# Patient Record
Sex: MALE | Race: BLACK OR AFRICAN AMERICAN | NOT HISPANIC OR LATINO | Employment: OTHER | URBAN - METROPOLITAN AREA
[De-identification: names, ages, dates, MRNs, and addresses within clinical notes are randomized per-mention and may not be internally consistent; named-entity substitution may affect disease eponyms.]

---

## 2024-07-19 DIAGNOSIS — I67.89 OTHER CEREBROVASCULAR DISEASE: Primary | ICD-10-CM

## 2024-07-22 ENCOUNTER — CLINICAL SUPPORT (OUTPATIENT)
Dept: REHABILITATION | Facility: HOSPITAL | Age: 44
End: 2024-07-22
Payer: OTHER GOVERNMENT

## 2024-07-22 DIAGNOSIS — I67.89 OTHER CEREBROVASCULAR DISEASE: Primary | ICD-10-CM

## 2024-07-22 DIAGNOSIS — I69.322 DYSARTHRIA AS LATE EFFECT OF CEREBELLAR CEREBROVASCULAR ACCIDENT (CVA): ICD-10-CM

## 2024-07-22 DIAGNOSIS — I69.320 APHASIA AS LATE EFFECT OF CEREBROVASCULAR ACCIDENT (CVA): ICD-10-CM

## 2024-07-22 PROCEDURE — 92523 SPEECH SOUND LANG COMPREHEN: CPT

## 2024-07-22 NOTE — PLAN OF CARE
"[facility]  Speech Therapy Outpatient Evaluation -Neurological Rehabilitation    Date: 7/22/2024     Name: Yong Stallings   MRN: [id number]    Therapy Diagnosis:   Encounter Diagnoses   Name Primary?    Other cerebrovascular disease Yes    Aphasia as late effect of cerebrovascular accident (CVA)     Dysarthria as late effect of cerebellar cerebrovascular accident (CVA)    Physician: Mayito Langston MD  Physician Orders: ST eval and treat  Medical Diagnosis: CVA    Visit # / Visits Authorized:  1 / 15   Date of Evaluation:  7/22/2024   Insurance Authorization Period: 180 days  Plan of Care Certification:    7/22/2024 to 10/22/2024      Time In: 1300   Time Out: 1405    Procedure Min.   Speech Language Evaluation   65          Precautions: Standard and Fall    Subjective     Date of Onset: CVA Aug 2, 2021    History of Current Condition:   Pt communicative at word level at times; majority of history from Pt's father (Joe) and caregiver (Sherri).   Past Medical History: Yong Stallings  has no past medical history on file.  Pt and sitter reported CVA in [facility] 09/2/24 and Pt remained in [facility] due to no physician clearance for Pt to fly until Feb 2024. Pt may have received therapy in the [facility] but this was unclear. Pt has received home health since February but reportedly "he has made all the progress he can make with home health." Family reports he was "terrible" and "like a homeless person" when he arrived from the [facility]. Family reports "so much" progress in physical ability since February but reports speech/language improvement as "I guess a little". Family reports Pt is able to repeat long sentences at home.   Medical Hx and Allergies: Family reports he is on seizure medication with most recent seizure 4 months ago. Sleep and anxiety medications are also reported.   Prior Therapy:  home health 1x/week  Social History:  Pt lives with his father. Sitter present M-F.   Prior Level of Function: " independent prior to CVA   Nutrition:  PO intake - regular with thin   Patient's Therapy Goals: Pt gestured desire to verbally communicate.  Objective   MOTOR SPEECH:  PHYSIOLOGY OF GENERAL MOVEMENT:  Global Posture: right lean 2/2 hemiparesis    MOTOR CONTROL:  Torso: weak  Neck: WFL  Arms/Hands: right michi  Legs/Feet: right michi; weakness in left suspected      PHYSIOLOGY OF SPEECH MECHANISM:  MOTOR CONTROL:  Respiration: WFL  Phonation: reduced breath support   Lips: right flaccidity and reduced ROM  Tongue: deviation to right  Jaw: WFL  Soft Palate: WFL    SENSATION:  Reduced sensation on all aspects of right face    GENERAL ATTENTION:  Orientation: unable to verbalize response to orientation questions; able to verbalize with phonemic cues  Attention: reduced sustained and selective attention noted in structured tasks  Visual Attention: right neglect noted; visuospatial deficits warrants further evaluation    LANGUAGE:   Receptive Language:  1 Step Directions: 100%   2 Step Directions: 25%  Complex Directions: 0%  Simple y/n Questions: 100%  Complex y/n Questions: 75% increased response time and repetition required at times  Paragraph Comprehension: 83%    Expressive Language:   Automatic Speech: -10 100%, days of week 0% independently, 100% with phonemic cues/models  Repetition: phoneme and word with cues only  Phrase completion: closed ended 100%, open ended 0%  Naming items: 100%  Item Function: 0%  WH questions: 0%  Divergent Namin/10 concrete      COGNITIVE STATUS:  Behavioral Observations: emotional lability with laughing noted; perseverative gestures appearing to ask 'why he had a stroke' or 'why he can;t move his right side'   Insight and Awareness: Pt aware of expressive and receptive language errors/difficulty  Pragmatics: reduced understanding of nonverbal cues; inappropriate communication to father re: facial expressions not consistent with conversation  Unable to further assess cognition due to  limited volitional verbal output      Education     Education: Plan of Care, role of SLP in care, and aphasia  were discussed with pt. Patient and family members expressed understanding.     Home Program: to be established; communication strategies via verbal phonemic cues and repetition of self with success reviewed     Assessment     Yong presents to [facility] s/p medical diagnosis of CVA. He presents with likely transcortical motor aphasia (further assessment with BDAE warranted) characterized by impairments including mod level comprehension, basic expression with grimacing at times, imapired motor speech at phoneme level, intact repetition at times, reduced cognition with perseverations.  Positive prognostic factors include motivation. Negative prognostic factors include 3 years post CVA. No barriers to therapy identified. Patient will benefit from skilled, outpatient neurological rehabilitation speech therapy.    Rehab Potential: fair  Pt's spiritual, cultural, and educational needs considered and patient agreeable to plan of care and goals.    Short term goals:    Pt will participate in standardized assessment with BDAE.     Pt will verbalize name for basic introductions with min phonemic cues.     Pt will label personally relevant items with 75% acc min cues.     Pt will comprehend personally relevant information at short story length with 75% acc no cues.         *Short term goals to be adjusted as needed pending BDAE results.     Long Term Goal: Pt will communicate basic wants and needs via any modality with trained caregivers.       Plan     Plan of Care Certification Period: 7/22/2024 to 10/22/2024     Recommended Treatment Plan:  Patient will participate in the [facility] rehabilitation program for speech therapy 3 times per week to address his Communication and Motor Speech deficits, to educate patient and their family, and to participate in a home exercise program.    Other  Recommendations: OT and PT    Therapist's Name:   Kate Olvera CCC-SLP   7/22/2024     I CERTIFY THE NEED FOR THESE SERVICES FURNISHED UNDER THIS PLAN OF TREATMENT AND WHILE UNDER MY CARE    Physician Name: _______________________________    Physician Signature: ____________________________

## 2024-07-23 DIAGNOSIS — I67.89 OTHER CEREBROVASCULAR DISEASE: Primary | ICD-10-CM

## 2024-07-24 ENCOUNTER — CLINICAL SUPPORT (OUTPATIENT)
Dept: REHABILITATION | Facility: HOSPITAL | Age: 44
End: 2024-07-24
Payer: OTHER GOVERNMENT

## 2024-07-24 DIAGNOSIS — I69.320 APHASIA AS LATE EFFECT OF CEREBROVASCULAR ACCIDENT (CVA): ICD-10-CM

## 2024-07-24 DIAGNOSIS — I69.322 DYSARTHRIA AS LATE EFFECT OF CEREBELLAR CEREBROVASCULAR ACCIDENT (CVA): ICD-10-CM

## 2024-07-24 DIAGNOSIS — I67.89 OTHER CEREBROVASCULAR DISEASE: Primary | ICD-10-CM

## 2024-07-24 PROCEDURE — 97162 PT EVAL MOD COMPLEX 30 MIN: CPT

## 2024-07-24 PROCEDURE — 92507 TX SP LANG VOICE COMM INDIV: CPT

## 2024-07-25 NOTE — PROGRESS NOTES
"  Speech and Language Therapy Outpatient Progress Note  Date:  7/24/2024     Name: Yong Stallings   MRN: [id number]   Therapy Diagnosis:   Encounter Diagnoses   Name Primary?    Other cerebrovascular disease Yes    Aphasia as late effect of cerebrovascular accident (CVA)     Dysarthria as late effect of cerebellar cerebrovascular accident (CVA)    Physician: Mayito Langston MD  Physician Orders: speech eval and treat  Medical Diagnosis: CVA    Visit #/Visits authorized: 2/ 15  Date of Evaluation:  07/23/24  Insurance Authorization Period: 180 days  Plan of Care:      7/22/2024 to 10/22/2024   Extended POC:  n/a      UNTIMED  Procedure Min.   {Speech- Language- Voice Therapy  60min / 1 unit           Total Untimed Units: 1  Charges Billed/# of units: 1  Time In:  13:00  Time Out:  14:00   Total Billable Time: 60 min / 1 unit     Precautions: Standard and Fall    Subjective:   Pt reports: Pt reports he is "good" and participated well throughout with good effort and broad affect demonstrated.      Objective:     BDAE short form results:    Rating Scale Profile of Speech Characteristics:  Articulatory Agility   2/7   Phrase Length  3/7   Grammatical Form  3/7    Melodic Line (prosody)   3/7    Paraphasia in running speech   none   Word finding relative to fluency     Sentence repetition     Auditory comprehension       Domain  Subtest Raw Score Percentile    Conversational/Expository Speech  Simple Social Responses  1/7 <10   Auditory Comprehension Basic word discrimination   11/16 15    Commands  6/10 25    Complex Ideational Material   4/6 50   Articulation  Articulatory Agility (rating Scale)  2/7 10   Recitation  Automatized Sequences   2/4  20   Repetition  Words  2/5  20    Sentences  0/2 30   Naming  Responsive Naming   1/10 15    [place] Naming Test   1/15 20    Special Categories   0/12 0   Reading  Matching Cases and Scripts  4/4 100    Number Matching   4/4 100    Picture-Word Matching   3/4 40    Oral Word Reading "   0/15 10    Oral Sent Reading   0/5 30    Oral Sentence Reading  0/3 10    Oral Sentence/Paragraph Comprehension   1/4  10      hort term goals:     Pt will participate in standardized assessment with BDAE.   met 24   Pt will verbalize name and personally relevant information via naming or wh-questions for basic communication with min phonemic cues.      Pt will label personally relevant items with 75% acc min cues.      Pt will comprehend personally relevant information at short story length with 75% acc no cues.       Pt will tolerate NMES to right buccal/face for improved ROM and sensation for control of secretions and increased ROM. mA to be determined.     *Short term goals to be adjusted as needed pending BDAE results.      Long Term Goal: Pt will communicate basic wants and needs via any modality with trained caregivers.           Patient Education/Response:   SLP educated Pt and family on aphasia and likely transcortical motor aphasia.     Written Home Exercises Provided: naming items at mealtime  Exercises were reviewed and Yong was able to demonstrate them prior to the end of the session.  Yong demonstrated good  understanding of the education provided.         Assessment:   Yong is progressing well towards his goals.  Current goals remain appropriate. Goals to be updated as necessary.     Pt prognosis is Fair. Pt will continue to benefit from skilled outpatient speech and language therapy to address the deficits listed in the problem list on initial evaluation, provide pt/family education and to maximize pt's level of independence in the home and community environment.     KT NOMS (Functional Communication Measures):  KT NOMS: EVAL (... ) CURRENT   Attention: 4    Memory: 4    Motor Speech: 3    Readin    Spoken Language Comprehension: 4    Spoken Language Expression: 2    Swallowin           Barriers to Therapy: prolonged period since CVA  Pt's spiritual, cultural and educational needs  considered and pt agreeable to plan of care and goals.    Plan:   Continue POC with focus on functional communication of basic wants/needs.       Kate Olvera MA, CCC-SLP  07/26/2024

## 2024-07-25 NOTE — PROGRESS NOTES
"[facility]   Physical Therapy Initial Evaluation        Name: Yong Stallings      Therapy Diagnosis:   Encounter Diagnosis   Name Primary?    Other cerebrovascular disease Yes     Physician: Mayito Langston MD    Physician Orders: PT Eval and Treat  Medical Diagnosis from Referral: h/o CVA  Evaluation Date: 7/24/2024    Visit # / Visits authorized: 1/15    Time In: 1400  Time Out: 1508  Total Billable Time: 68 minutes    Precautions: Fall        Subjective     Date of onset: CVA occurred 3 years ago    History of current condition - History obtained from pt's father who was present throughout assessment. Pt reportedly suffered a CVA while living in the [facility] 3 years ago.  Pt just recently returned to the [facility] in Feb of 2024.  Pt did receive therapy services in the [facility], but pt's father was unable to provide specifics.  Since his return to the , pt has been residing with his father who serves as his primary caregiver. Pt also has a sitter who is present M-F from 9-5:30.  Pt and father reside in a single story home without steps to enter. Pt has a manual WC in which he presented to therapy today. PT noted that the WC does not have removable armrests or foot rests. Father later reported that the foot rests were in the car.  Pt also has a transport WC which he utilizes in the bathroom due to the narrow doorways. [facility] which pt reportedly "refuses to use".  Pt's father showed PT a picture of the chair.  The power WC has a L joystick and a forearm trough on the R.  Pt also has a "sliding tub bench" and a gait belt at home. Pt is assisted with all mobility.  Sitter and father dependently transfer pt to and from his WC.  Pt sleeps in a bed with an elevating function.  Bed railings are available, but father reports that pt refused to allow them to be attached to the bed.  In terms of ADLs, pt is assisted with bathing, dressing, and toilet transfers. Pt " "does assist with bathing and dressing as able, and performs his own hygiene for toileting.  Prior to his referral to outpatient therapy, pt was receiving HH services.  Father reports that pt was working on standing while holding onto the kitchen countertop.  Pt does present with aphasia and some limited communication.  Pt is limitedly verbal but his very expressive via gestures and facial expressions. Pt did appear to understand more complex conversation, but command following was limited to single step. Pt is currently receiving outpatient speech therapy services and will be evaluated by OT later this week.      Medical History:   No past medical history on file.    Surgical History:   Yong Stallings  has no past surgical history on file.    Medications:   Yong currently has no medications in their medication list.    Allergies:   Review of patient's allergies indicates:  Not on File       Prior Therapy:  services  Social History:  lives with this father  Occupation: previously in the [employer]      Pain:  Current 0/10      Pts goals: "to walk again".          Objective     Posture: fair sitting posture noted in the WC.  Posterior pelvic tilt observed.  Cushion in place.  R arm in a resting hand splint.  Feet on the floor rather than on footrests.      Sensation: Impaired light touch sensation present on the RLE. Pt able to correctly identify approximately 50% of points of light touch.      Range of Motion/Strength: LLE -- hip flexion 2+/5, knee extension 4/5, knee flexion 4/5, DF 3+/5.  No volitional movement noted in the RLE.      Gait Analysis:  Pt is non-ambulatory      Functional Mobility:     Assist Level Assistive Device Comments    Bed Mobility MaxA  Pt's father assisted with sitting to supine and supine to sitting transitions for PT to observe pt's baseline level of functioning.  Pt did utilize his RUE to assist, but father assisted with trunk and BLE.  Father reported that pt refuses to allow railings to be " attached to his bed.  With the use of a railing, PT believes that pt could be much more independent with bed mobility.     Sit to stand/Stand to sit ModA-MaxA  Sit to stand/stand to sit transitions performed from WC level and from the low mat.  From the WC, pt stood x 3 trials utilizing a railing positioned anteriorly. Pt pulled himself up into standing with the LUE.  ModA required to clear buttocks.  Once upright, pt was able to maintain static standing with Lola.  R and L weight shifting was performed x 10 reps with PT providing assistance.  TC provided for posture.  Pt's R foot contacted the floor, but no active weight bearing was observed.  Pt also stood x 3 trials from the low mat in the gym.  Due to the height of this surface, increased assistance was required from PT.  Pt utilized the railing positioned to his L.  PT stood anteriorly and blocked pt's R knee to prevent buckling.  VC provided for increased eccentric control during descent.  This improved with cueing.     Transfers MaxA  Father dependently transferred pt from the WC to the low mat.  Pt's feet were observed losing contact with the floor as father completely supported the pt's body weight.  Sitter transferred pt back to the WC via a stand-pivot.      PT positioned pt at the low mat and instructed pt to utilize the railing for assistance to pivot from the WC to the mat.  Pt expressed hesitation and shook his head, appearing skeptical.  Pt performed the transition with modA provided by PT.  Pt also utilized the railing to perform a stand pivot from the mat to return to the WC.     Gait      Balance Training      Wheelchair Mobility Lola  Pt able to propel and steer his manual WC approximately 15 ft and park it next to the mat to simulate mobility within the home.  Since pt did not have a foot rest, PT held pt's R foot off the floor.  Pt required cueing and instruction for use of the LUE and LLE.  Pt could very likely propel himself within the home  "with modified independence, but pt does not do so currently. Pt allows his father and sitter to propel his WC.     Stair Climbing      Car Transfer      Other:  Hailey  Pt performed 2 sets of 5 modified sit ups at the edge of the mat.  A wedge and pillow were placed behind pt. Pt performed the first 4 reps without PT's assistance, but fatigued quickly.  PT stabilized pt's legs and pt used his LUE for assistance.               Assessment     Yong is a 44 y.o. male referred to outpatient Physical Therapy with a medical diagnosis of a previous CVA. Pt presents to PT with limitations in basic mobility and is very dependent on his father and caregiver for mobility and ADLs.  PT feels that with the appropriate DME and transfer training, pt can become much more independent at a WC level. Pt adamantly expresses that his current goal is "to walk".  PT and SLP spoke with pt about realistic goal setting and needing to establish increased independence with basic transfers.  Pt appears very motivated to participate, but does need continued education about the expectations of therapy.  At this time, PT feels that it would be very reasonable to focus on bed mobility and WC transfers in an effort to reduce caregiver burden and maximize pt's independence at a WC level. Pt does have a custom motorized WC, but refuses to utilize this device.  Pt will benefit from a standard 18 inch WC with removable armrests and footrests.  This will promote increased independence with basic transfers and also improve pt's safety when being assisted by his father/caregiver. As previously mentioned, pt is scheduled to be evaluated by OT later this week. Will tentatively establish pt's POC for 2 times per week for 7 weeks.  Once OT evaluates pt, will coordinate to determine the most appropriate POC moving forward.      Pt prognosis is Fair.   Pt will benefit from skilled outpatient Physical Therapy to address the deficits stated above and in the chart " below, provide pt/family education, and to maximize pt's level of independence.     Plan of care discussed with patient: Yes  Pt's spiritual, cultural and educational needs considered and pt agreeable to plan of care and goals as stated below:     Anticipated Barriers for therapy: duration since CVA, questionable expectations from pt          Goals:    Short Term Goals (4 Weeks):     1)  Pt will perform bed mobility (sitting to supine, supine to sitting) with Lola.  2)  Pt will perform sit to stand/stand to sit transitions with Lola.   3)  Pt will perform stand pivot transfers to and from his WC with modA.  4)  Pt will self-propel his manual  ft with SBA using his LUE and LLE.        Long Term Goals (7 Weeks):     1)  Pt will perform bed mobility (sitting to supine, supine to sitting) with supervision.  2)  Pt will perform sit to stand/stand to sit transitions with CGA.  3)  Pt will perform stand pivot transfers to and from his WC with Lola.   4)  Pt will self-propel his manual  ft with modified independence using his LUE and LLE.            Plan     Plan of care Certification: 7/24/2024 to 10/24/2024.    Outpatient Physical Therapy 2 times weekly for 7 weeks to include the following interventions: Patient Education, Therapeutic Activities, and Therapeutic Exercise.     Cliff Hollis, PT, DPT        I CERTIFY THE NEED FOR THESE SERVICES FURNISHED UNDER THIS PLAN OF TREATMENT AND WHILE UNDER MY CARE    Physician Name: _______________________________    Physician Signature: ____________________________

## 2024-07-26 ENCOUNTER — CLINICAL SUPPORT (OUTPATIENT)
Dept: REHABILITATION | Facility: HOSPITAL | Age: 44
End: 2024-07-26
Payer: OTHER GOVERNMENT

## 2024-07-26 DIAGNOSIS — I67.89 OTHER CEREBROVASCULAR DISEASE: Primary | ICD-10-CM

## 2024-07-26 DIAGNOSIS — I67.89 CEREBROVASCULAR DISEASE, ACUTE: ICD-10-CM

## 2024-07-26 DIAGNOSIS — I69.322 DYSARTHRIA AS LATE EFFECT OF CEREBELLAR CEREBROVASCULAR ACCIDENT (CVA): ICD-10-CM

## 2024-07-26 DIAGNOSIS — I69.320 APHASIA AS LATE EFFECT OF CEREBROVASCULAR ACCIDENT (CVA): ICD-10-CM

## 2024-07-26 PROCEDURE — 92507 TX SP LANG VOICE COMM INDIV: CPT

## 2024-07-26 PROCEDURE — 97166 OT EVAL MOD COMPLEX 45 MIN: CPT

## 2024-07-26 NOTE — PROGRESS NOTES
"[facility]   OUTPATIENT THERAPY     Occupational Therapy Initial Evaluation    Date: 2024  Name: Yong Stallings  Clinic Number: [id number]  : 24      Therapy Diagnosis:   Encounter Diagnoses   Name Primary?    Other cerebrovascular disease Yes    Cerebrovascular disease, acute      Physician: Mayito Langston MD    Physician Orders: OT Evaluate and Treat  Medical Diagnosis: Cerebrovascular Disease  Surgical Procedure and Date: N/A  Evaluation Date: 2024  Insurance Authorization Period Expiration: TBD  Plan of Care Expiration: 10/25/24  Progress Note Due: 24   Date of Return to MD: TBD  Visit # / Visits authorized: 15 / 15    Precautions:  Seizure    Medical History:   No past medical history on file.    Surgical History:    has no past surgical history on file.    Medications:   currently has no medications in their medication list.    Allergies:   Review of patient's allergies indicates:  Not on File                   SUBJECTIVE     Date of Onset: CVA occurred 3 years prior.    History of Current Condition/Mechanism of Injury:   History taken from PT evaluation:  "History obtained from pt's father who was present throughout assessment. Pt reportedly suffered a CVA while living in the [facility] 3 years ago. Pt just recently returned to the [facility] in . Pt did receive therapy services in the [facility], but pt's father was unable to provide specifics. Since his return to the , pt has been residing with his father who serves as his primary caregiver. Pt also has a sitter who is present M-F from 9-5:30. Pt and father reside in a single-story home without steps to enter. Pt has a manual WC in which he presented to therapy today. PT noted that the WC does not have removable armrests or footrests. Father later reported that the footrests were in the car. Pt also has a transport WC which he utilizes in the bathroom due to the narrow doorways. [facility] " "custom power chair which pt reportedly "refuses to use". Pt's father showed PT a picture of the chair. The power WC has a L joystick and a forearm trough on the R. Pt also has a "sliding tub bench" and a gait belt at home. Pt is assisted with all mobility. Sitter and father dependently transfer pt to and from his WC. Pt sleeps in a bed with an elevating function. Bed railings are available, but father reports that pt refused to allow them to be attached to the bed. In terms of ADLs, pt is assisted with bathing, dressing, and toilet transfers. Pt does assist with bathing and dressing as able and performs his own hygiene for toileting. Prior to his referral to outpatient therapy, pt was receiving HH services. Father reports that pt was working on standing while holding onto the kitchen countertop. Pt does present with aphasia and some limited communication. Pt is limitedly verbal but his very expressive via gestures and facial expressions. Pt did appear to understand more complex conversation, but command following was limited to single step. Pt is currently receiving outpatient speech therapy services and will be evaluated by OT later this week."    Falls:  Pt sustained a seizure on 06/26/24, fell out of wheelchair and hit his head. See imaging report below.    Involved Side: Right  Dominant Side: Right  Imaging: CT studies, 06/26/24   "CT Head without Contrast  Result Date: 6/26/2024  History: Seizure, fall, head injury Comparison studies: None Technique: Axial scans were obtained from skull base to the vertex. Coronal and sagittal reconstructions obtained from the axial data. Automatic exposure control was utilized to limit radiation dose. Contrast: None Radiation Dose: Total DLP: 1233 mGy*cm DISCUSSION: Scalp/Skull: No acute abnormalities. Previous left frontal craniectomy. Brain sulci: Appropriate for patient's age. Ventricles: Ex vacuo dilatation throughout the left lateral and 3rd ventricles. Otherwise normal " "in size and configuration. No hydrocephalus. Extra-axial spaces: No masses or fluid collections. Parenchyma: Scattered encephalomalacic changes throughout the left cerebral hemisphere compatible with remote insults. No mass, hemorrhage or CT evidence of an acute vascular insult. Dural sinuses: No abnormal densities. Sellar/Suprasellar region: No abnormalities. Skull base and Craniocervical junction: No abnormalities. Incidental findings: Diffusely hyperdense intracranial vasculature in keeping with hemoconcentration. "  Prior Therapy: Pt received inpatient therapy in the [facility] and Home Health services once transferred to fathers' home in .  Occupation:  [employer]  Working presently: disability  Duties: NA    Functional Limitations/Social History:    Previous functional status includes: Independent with all ADLs.     Current Functional Status   Home/Living environment: Lives with father in a [facility].      Limitation of Functional Status as follows:   ADLs/IADLs:     - Feeding: Setup using left hand.    - Bathing: Moderate assist seated on TTB.    - Dressing/Grooming: Dressing-Max assist LE, Mod assist UE.  Grooming-SBA after setup                         - Toileting: Max assist transfer on<>off.  Pt requires max assist with clothing management but can perform hygiene with setup.    - Driving: NA    Pain:  Functional Pain Scale Rating 0-10: Current 0/10, worst 0/10, best 0/10     Patient's Goals for Therapy: "To walk again."      OBJECTIVE     Upon initial contact, noted well-nourished well-groomed male sitting in a wheelchair with right resting hand splint donned.  Noted RUE held in flexed position against body.  Transferred pt wc to mat max assist stand pivot.  Noted pt unable to pivot BLEs.  Noted pt able to sit unsupported but noted LOB when challenged to right side, pt able to recover when challenged to left, forward, and back.  Assessed oculomotor scanning and noted deficits all planes.  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  Transferred sit to supine max assist to right.  Assessed right scapular mobility and noted stiffness all planes.  Pt unable to elicit AROM all shoulder planes but did note trace activation in abduction.  Removed right resting hand splint. Pt demonstrates severe hypertonicity through RUE and holds UE in a flexor synergy pattern.  After slow progressive passive stretching was able to achieve full extension at the elbow, wrist, and all digits.  PROM of the shoulder was able to achieve 110 degrees flexion, 80 degrees abduction, and 30 degrees ER.   Assessed bed mobility and pt able to roll to left/right with min assist grabbing onto edge of mat with LUE.  Did note moderate trunk rigidity.  Transferred supine to sit mod assist with BLE then mod assist with upper trunk. Reapplied right resting hand splint and assess fit.         Patient Education and Home Exercises      Education provided:   - Educated pt/family on homonomous hemiopsia and strategies when addressing pt and where to place items in his visual field.  Discussed with pt/family realistic goals based on pt's functional deficits.    Patient/Family Education: role of OT, goals for OT, scheduling/cancellations - pt verbalized understanding. Discussed insurance limitations with patient.      ASSESSMENT     Yong Stallings is a 44 y.o. male referred to outpatient occupational therapy and presents with a medical diagnosis of CVA with right hemiplegia.  Patient presents with the following therapy deficits: Decreased ROM, Decreased  strength, Decreased pinch strength, Decreased functional hand use, Joint Stiffness, Diminished/Impaired Sensation, Diminished/Impaired Coordination, Decreased visual perception, Decreased functional transfers, and impaired ADL ability.  Following medical record review, it is determined that pt will benefit from occupational therapy services in order to improve  functional transfers and ADLs to decrease burden of care and maximize pt's functional abilities to improve quality of life. The following goals were discussed with the patient and patient agrees with them as to be addressed in the treatment plan. The patient's rehab potential is Good.     Anticipated barriers to occupational therapy: Severity and length of time since onset.    Pt has no cultural, educational or language barriers to learning provided.      The following goals were discussed with the patient and patient agrees with them as to be addressed in the treatment plan.     Goals:     LTG: (8 Weeks)   Pt will increase toilet transfers to min assist.  Pt with be able to don UE garments with min assist and LE garments with mod assist.  Pt will increase RUE function to be able to transfer supine to sit CGA from right side.    STG: (4 Weeks)   Pt will increase toilet transfers to mod assist stand pivot.  Pt will increase UE dressing to CGA and LE dressing to mod assist.  Pt will increase RUE function to be able to transfer supine to sit with min assist from right side.          PLAN   Plan of Care Certification: 7/26/2024 to 10/25/24.     Outpatient Occupational Therapy 2 times weekly for 7 weeks to include the following interventions: Manual therapy/joint mobilizations, Modalities for pain management, Therapeutic exercises/activities., Strengthening, Transfer training, ADL Training, Neuro reeducation and education.    Time In: 1500  Time Out: 1550  Total Appointment Time (timed & untimed codes): 50 minutes      BEN James        I CERTIFY THE NEED FOR THESE SERVICES FURNISHED UNDER THIS PLAN OF TREATMENT AND WHILE UNDER MY CARE  Physician's comments:      Physician's Signature: ___________________________________________________

## 2024-07-26 NOTE — PROGRESS NOTES
Speech and Language Therapy Outpatient Progress Note  Date:  7/26/2024     Name: Yong Stallings   MRN: [id number]   Therapy Diagnosis:   Encounter Diagnoses   Name Primary?    Other cerebrovascular disease Yes    Aphasia as late effect of cerebrovascular accident (CVA)     Dysarthria as late effect of cerebellar cerebrovascular accident (CVA)    Physician: Mayito Langston MD  Physician Orders: speech eval and treat  Medical Diagnosis: CVA    Visit #/Visits authorized: 2/ 15  Date of Evaluation:  07/23/24  Insurance Authorization Period: 180 days  Plan of Care:      7/22/2024 to 10/22/2024   Extended POC:  n/a      UNTIMED  Procedure Min.   {Speech- Language- Voice Therapy  60min / 1 unit           Total Untimed Units: 1  Charges Billed/# of units: 1  Time In:  13:00  Time Out:  14:00   Total Billable Time: 60 min / 1 unit     Precautions: Standard and Fall    Subjective:   Pt reports: Pt reports feeling well and appeared excited about the session.      Objective:     SLP placed Vital Stim electrodes on Pt bilateral buccal space targeting improved ROM of articulators. Left set to 3.0mA and right set to 8.0mA. Pt reported no sensation until 7.5mA on right. Stim provided throughout speech drills and mirror used at times.     Pt repeated 5 units of CV and CVC with auditory and/or visual articulatory cues required >75% of the time.    Short term goals:     Pt will participate in standardized assessment with BDAE.   met 07/24/24   Pt will complete automatic language tasks at mod level with >90% intelligibility and min cues.    Pt will verbalize name and personally relevant information via naming or wh-questions for basic communication with min phonemic cues.      Pt will label personally relevant items with 75% acc min cues.   Pt labeled common household items with 75% acc with phonemic cues required for all correct output. Pt able to repeat with 100% acc min cues.    Pt will comprehend personally relevant information at  short story length with 75% acc no cues.       Pt will tolerate NMES to right buccal/face for improved ROM and sensation for control of secretions and increased ROM.  Pt tolerated at the following levels with speech ROM drilled using personally relevant information throughout.  Left: 3.0mA  Right: 8.0mA        Long Term Goal: Pt will communicate basic wants and needs via any modality with trained caregivers.           Patient Education/Response:   SLP educated Pt and family on automatic language tasks, goals of NMES, and homework.    Written Home Exercises Provided: singing for automatic language, 5 unit CV and CVC repeats.  Exercises were reviewed and Yong was able to demonstrate them prior to the end of the session.  Yong demonstrated good  understanding of the education provided.         Assessment:   Yong is progressing well towards his goals.  Current goals remain appropriate. Goals to be updated as necessary.     Pt prognosis is Fair. Pt will continue to benefit from skilled outpatient speech and language therapy to address the deficits listed in the problem list on initial evaluation, provide pt/family education and to maximize pt's level of independence in the home and community environment.     KT NOMS (Functional Communication Measures):  KT NOMS: EVAL (... ) CURRENT   Attention: 4 4   Memory: 4 4   Motor Speech: 3 3   Readin 4   Spoken Language Comprehension: 4 4   Spoken Language Expression: 2 2   Swallowin 7          Barriers to Therapy: prolonged period since CVA  Pt's spiritual, cultural and educational needs considered and pt agreeable to plan of care and goals.    Plan:   Continue POC with focus on functional communication of basic wants/needs.       Kate Olvera MA, CCC-SLP  2024           Speech and Language Therapy Outpatient Progress Note  Date:  2024     Name: Yong Stallings   MRN: [id number]   Therapy Diagnosis:   Encounter Diagnoses   Name Primary?    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   {Speech- Language- Voice Therapy  60min / 1 unit           Total Untimed Units: 1  Charges Billed/# of units: 1  Time In:  13:00  Time Out:  14:00   Total Billable Time: 60 min / 1 unit     Precautions: Standard and Fall    Subjective:   Pt reports: Pt reports he is "good" and participated well throughout with good effort and broad affect demonstrated.      Objective:     BDAE short form results:    Rating Scale Profile of Speech Characteristics:  Articulatory Agility   2/7   Phrase Length  3/7   Grammatical Form  3/7    Melodic Line (prosody)   3/7    Paraphasia in running speech   none   Word finding relative to fluency     Sentence repetition     Auditory comprehension       Domain  Subtest Raw Score Percentile    Conversational/Expository Speech  Simple Social Responses  1/7 <10   Auditory Comprehension Basic word discrimination   11/16 15    Commands  6/10 25    Complex Ideational Material   4/6 50   Articulation  Articulatory Agility (rating Scale)  2/7 10   Recitation  Automatized Sequences   2/4  20   Repetition  Words  2/5  20    Sentences  0/2 30   Naming  Responsive Naming   1/10 15    [place] Naming Test   1/15 20    Special Categories   0/12 0   Reading  Matching Cases and Scripts  4/4 100    Number Matching   4/4 100    Picture-Word Matching   3/4 40    Oral Word Reading   0/15 10    Oral Sent Reading   0/5 30    Oral Sentence Reading  0/3 10    Oral Sentence/Paragraph Comprehension   1/4  10      hort term goals:     Pt will participate in standardized " assessment with BDAE.   met 24   Pt will verbalize personally relevant information via naming or wh-questions for basic communication with min phonemic cues.      Pt will label personally relevant items with 75% acc min cues.      Pt will comprehend personally relevant information at short story length with 75% acc no cues.       Pt will tolerate NMES to right buccal space/face for improved ROM and sensation for control of secretions and increased ROM. mA to be determined.     *Short term goals to be adjusted as needed pending BDAE results.      Long Term Goal: Pt will communicate basic wants and needs via any modality with trained caregivers.           Patient Education/Response:   SLP educated Pt and family on aphasia and likely transcortical motor aphasia.     Written Home Exercises Provided: naming items at mealtime  Exercises were reviewed and Yong was able to demonstrate them prior to the end of the session.  Yong demonstrated good  understanding of the education provided.         Assessment:   Yong is progressing well towards his goals.  Current goals remain appropriate. Goals to be updated as necessary.     Pt prognosis is Fair. Pt will continue to benefit from skilled outpatient speech and language therapy to address the deficits listed in the problem list on initial evaluation, provide pt/family education and to maximize pt's level of independence in the home and community environment.     KT NOMS (Functional Communication Measures):  KT NOMS: EVAL (... ) CURRENT   Attention: 4    Memory: 4    Motor Speech: 3    Readin    Spoken Language Comprehension: 4    Spoken Language Expression: 2    Swallowin           Barriers to Therapy: prolonged period since CVA  Pt's spiritual, cultural and educational needs considered and pt agreeable to plan of care and goals.    Plan:   Continue POC with focus on functional communication of basic wants/needs.       Kate Olvera MA,  CCC-SLP  07/30/2024

## 2024-07-29 ENCOUNTER — CLINICAL SUPPORT (OUTPATIENT)
Dept: REHABILITATION | Facility: HOSPITAL | Age: 44
End: 2024-07-29
Payer: OTHER GOVERNMENT

## 2024-07-29 DIAGNOSIS — I67.89 OTHER CEREBROVASCULAR DISEASE: Primary | ICD-10-CM

## 2024-07-29 DIAGNOSIS — I67.89 CEREBROVASCULAR DISEASE, ACUTE: ICD-10-CM

## 2024-07-29 DIAGNOSIS — I69.322 DYSARTHRIA AS LATE EFFECT OF CEREBELLAR CEREBROVASCULAR ACCIDENT (CVA): ICD-10-CM

## 2024-07-29 DIAGNOSIS — I69.320 APHASIA AS LATE EFFECT OF CEREBROVASCULAR ACCIDENT (CVA): ICD-10-CM

## 2024-07-29 PROCEDURE — 92507 TX SP LANG VOICE COMM INDIV: CPT

## 2024-07-30 NOTE — PROGRESS NOTES
"  Speech and Language Therapy   Outpatient Progress Note  Date:  7/29/2024     Name: Yong Stallings   MRN: [id number]   Therapy Diagnosis:   Encounter Diagnoses   Name Primary?    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   {Speech- Language- Voice Therapy  60min / 1 unit           Total Untimed Units: 1  Charges Billed/# of units: 1  Time In:  13:00  Time Out:  14:00   Total Billable Time: 60 min / 1 unit     Precautions: Standard and Fall    Subjective:   Pt reports: Pt reports feeling well and reported being "ready".      Objective:       Short term goals:     Pt will participate in standardized assessment with BDAE.   met 07/24/24   Pt will complete automatic language tasks at mod level with >90% intelligibility and min cues. Pt completed the following automatic language tasks:  1-10 initiation cues only  10-1 initiation cues only   Pledge: <25% acc max cues  SSB: <25% acc max cues   Pt will verbalize name and personally relevant information via naming or wh-questions for basic communication with min phonemic cues.   Pt responded to questions with the following targeted information:  Name: phonemic cues (PC) required 100% of time initially, 3x independently by end of session  Bday: PC required 100% of time  Familty names: % of time; Dad independent x2  SLP created video on Dad's phone of Pt at single word level for function words (hmwk to watch and repeat).   Pt will label personally relevant items with 75% acc min cues.      Pt will comprehend personally " relevant information at short story length with 75% acc no cues.       Pt will tolerate NMES to right buccal/face for improved ROM and sensation for control of secretions and increased ROM.         Long Term Goal: Pt will communicate basic wants and needs via any modality with trained caregivers.           Patient Education/Response:   SLP educated Pt and family on automatic language concepts.    Written Home Exercises Provided:video with repeats (see above) and automatic language tasks   Exercises were reviewed and Yong was able to demonstrate them prior to the end of the session.  Yong demonstrated good  understanding of the education provided.         Assessment:   Yong is progressing well towards his goals.  Current goals remain appropriate. Goals to be updated as necessary.     Pt prognosis is Fair. Pt will continue to benefit from skilled outpatient speech and language therapy to address the deficits listed in the problem list on initial evaluation, provide pt/family education and to maximize pt's level of independence in the home and community environment.     KT NOMS (Functional Communication Measures):  KT NOMS: EVAL (... ) CURRENT   Attention: 4 4   Memory: 4 4   Motor Speech: 3 3   Readin 4   Spoken Language Comprehension: 4 4   Spoken Language Expression: 2 2   Swallowin 7          Barriers to Therapy: prolonged period since CVA  Pt's spiritual, cultural and educational needs considered and pt agreeable to plan of care and goals.    Plan:   Continue POC with focus on functional communication of basic wants/needs.       Kate Olvera MA, CCC-SLP  2024

## 2024-08-01 ENCOUNTER — CLINICAL SUPPORT (OUTPATIENT)
Dept: REHABILITATION | Facility: HOSPITAL | Age: 44
End: 2024-08-01
Payer: OTHER GOVERNMENT

## 2024-08-01 DIAGNOSIS — I67.89 OTHER CEREBROVASCULAR DISEASE: Primary | ICD-10-CM

## 2024-08-01 DIAGNOSIS — I69.322 DYSARTHRIA AS LATE EFFECT OF CEREBELLAR CEREBROVASCULAR ACCIDENT (CVA): ICD-10-CM

## 2024-08-01 DIAGNOSIS — I69.320 APHASIA AS LATE EFFECT OF CEREBROVASCULAR ACCIDENT (CVA): ICD-10-CM

## 2024-08-01 DIAGNOSIS — I67.89 CEREBROVASCULAR DISEASE, ACUTE: ICD-10-CM

## 2024-08-01 PROCEDURE — 92507 TX SP LANG VOICE COMM INDIV: CPT

## 2024-08-02 ENCOUNTER — CLINICAL SUPPORT (OUTPATIENT)
Dept: REHABILITATION | Facility: HOSPITAL | Age: 44
End: 2024-08-02
Payer: OTHER GOVERNMENT

## 2024-08-02 DIAGNOSIS — I69.320 APHASIA AS LATE EFFECT OF CEREBROVASCULAR ACCIDENT (CVA): ICD-10-CM

## 2024-08-02 DIAGNOSIS — I67.89 OTHER CEREBROVASCULAR DISEASE: Primary | ICD-10-CM

## 2024-08-02 DIAGNOSIS — I67.89 CEREBROVASCULAR DISEASE, ACUTE: ICD-10-CM

## 2024-08-02 DIAGNOSIS — I69.322 DYSARTHRIA AS LATE EFFECT OF CEREBELLAR CEREBROVASCULAR ACCIDENT (CVA): ICD-10-CM

## 2024-08-02 PROCEDURE — 92507 TX SP LANG VOICE COMM INDIV: CPT

## 2024-08-02 NOTE — PLAN OF CARE
"[facility]   OUTPATIENT THERAPY     Occupational Therapy Initial Evaluation    Date: 2024  Name: Yong Stallings  Clinic Number: [id number]  : 23      Therapy Diagnosis:   Encounter Diagnoses   Name Primary?    Other cerebrovascular disease Yes    Cerebrovascular disease, acute      Physician: Mayito Langston MD    Physician Orders: OT Evaluate and Treat  Medical Diagnosis: Cerebrovascular Disease  Surgical Procedure and Date: N/A  Evaluation Date: 2024  Insurance Authorization Period Expiration: TBD  Plan of Care Expiration: 10/25/24  Progress Note Due: 24   Date of Return to MD: TBD  Visit # / Visits authorized: 15 / 15    Precautions:  Seizure    Medical History:   No past medical history on file.    Surgical History:    has no past surgical history on file.    Medications:   currently has no medications in their medication list.    Allergies:   Review of patient's allergies indicates:  Not on File                   SUBJECTIVE     Date of Onset: CVA occurred 3 years prior.    History of Current Condition/Mechanism of Injury:   History taken from PT evaluation:  "History obtained from pt's father who was present throughout assessment. Pt reportedly suffered a CVA while living in the [facility] 3 years ago. Pt just recently returned to the [facility] [place] in . Pt did receive therapy services in the [facility], but pt's father was unable to provide specifics. Since his return to the , pt has been residing with his father who serves as his primary caregiver. Pt also has a sitter who is present M-F from 9-5:30. Pt and father reside in a single-story home without steps to enter. Pt has a manual WC in which he presented to therapy today. PT noted that the WC does not have removable armrests or footrests. Father later reported that the footrests were in the car. Pt also has a transport WC which he utilizes in the bathroom due to the narrow doorways. [facility] " "custom power chair which pt reportedly "refuses to use". Pt's father showed PT a picture of the chair. The power WC has a L joystick and a forearm trough on the R. Pt also has a "sliding tub bench" and a gait belt at home. Pt is assisted with all mobility. Sitter and father dependently transfer pt to and from his WC. Pt sleeps in a bed with an elevating function. Bed railings are available, but father reports that pt refused to allow them to be attached to the bed. In terms of ADLs, pt is assisted with bathing, dressing, and toilet transfers. Pt does assist with bathing and dressing as able and performs his own hygiene for toileting. Prior to his referral to outpatient therapy, pt was receiving HH services. Father reports that pt was working on standing while holding onto the kitchen countertop. Pt does present with aphasia and some limited communication. Pt is limitedly verbal but his very expressive via gestures and facial expressions. Pt did appear to understand more complex conversation, but command following was limited to single step. Pt is currently receiving outpatient speech therapy services and will be evaluated by OT later this week."    Falls:  Pt sustained a seizure on 06/26/24, fell out of wheelchair and hit his head. See imaging report below.    Involved Side: Right  Dominant Side: Right  Imaging: CT studies, 06/26/24   "CT Head without Contrast  Result Date: 6/26/2024  History: Seizure, fall, head injury Comparison studies: None Technique: Axial scans were obtained from skull base to the vertex. Coronal and sagittal reconstructions obtained from the axial data. Automatic exposure control was utilized to limit radiation dose. Contrast: None Radiation Dose: Total DLP: 1233 mGy*cm DISCUSSION: Scalp/Skull: No acute abnormalities. Previous left frontal craniectomy. Brain sulci: Appropriate for patient's age. Ventricles: Ex vacuo dilatation throughout the left lateral and 3rd ventricles. Otherwise normal " "in size and configuration. No hydrocephalus. Extra-axial spaces: No masses or fluid collections. Parenchyma: Scattered encephalomalacic changes throughout the left cerebral hemisphere compatible with remote insults. No mass, hemorrhage or CT evidence of an acute vascular insult. Dural sinuses: No abnormal densities. Sellar/Suprasellar region: No abnormalities. Skull base and Craniocervical junction: No abnormalities. Incidental findings: Diffusely hyperdense intracranial vasculature in keeping with hemoconcentration. "  Prior Therapy: Pt received inpatient therapy in the [facility] and Home Health services once transferred to fathers' home in .  Occupation:  [employer]  Working presently: disability  Duties: NA    Functional Limitations/Social History:    Previous functional status includes: Independent with all ADLs.     Current Functional Status   Home/Living environment: Lives with father in a [facility].      Limitation of Functional Status as follows:   ADLs/IADLs:     - Feeding: Setup using left hand.    - Bathing: Moderate assist seated on TTB.    - Dressing/Grooming: Dressing-Max assist LE, Mod assist UE.  Grooming-SBA after setup                         - Toileting: Max assist transfer on<>off.  Pt requires max assist with clothing management but can perform hygiene with setup.    - Driving: NA    Pain:  Functional Pain Scale Rating 0-10: Current 0/10, worst 0/10, best 0/10     Patient's Goals for Therapy: "To walk again."      OBJECTIVE     Upon initial contact, noted well-nourished well-groomed male sitting in a wheelchair with right resting hand splint donned.  Noted RUE held in flexed position against body.  Transferred pt wc to mat max assist stand pivot.  Noted pt unable to pivot BLEs.  Noted pt able to sit unsupported but noted LOB when challenged to right side, pt able to recover when challenged to left, forward, and back.  Assessed oculomotor scanning and noted deficits all planes.  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  Transferred sit to supine max assist to right.  Assessed right scapular mobility and noted stiffness all planes.  Pt unable to elicit AROM all shoulder planes but did note trace activation in abduction.  Removed right resting hand splint. Pt demonstrates severe hypertonicity through RUE and holds UE in a flexor synergy pattern.  After slow progressive passive stretching was able to achieve full extension at the elbow, wrist, and all digits.  PROM of the shoulder was able to achieve 110 degrees flexion, 80 degrees abduction, and 30 degrees ER.   Assessed bed mobility and pt able to roll to left/right with min assist grabbing onto edge of mat with LUE.  Did note moderate trunk rigidity.  Transferred supine to sit mod assist with BLE then mod assist with upper trunk. Reapplied right resting hand splint and assess fit.         Patient Education and Home Exercises      Education provided:   - Educated pt/family on homonomous hemiopsia and strategies when addressing pt and where to place items in his visual field.  Discussed with pt/family realistic goals based on pt's functional deficits.    Patient/Family Education: role of OT, goals for OT, scheduling/cancellations - pt verbalized understanding. Discussed insurance limitations with patient.      ASSESSMENT     Yong Stallings is a 44 y.o. male referred to outpatient occupational therapy and presents with a medical diagnosis of CVA with right hemiplegia.  Patient presents with the following therapy deficits: Decreased ROM, Decreased  strength, Decreased pinch strength, Decreased functional hand use, Joint Stiffness, Diminished/Impaired Sensation, Diminished/Impaired Coordination, Decreased visual perception, Decreased functional transfers, and impaired ADL ability.  Following medical record review, it is determined that pt will benefit from occupational therapy services in order to improve  functional transfers and ADLs to decrease burden of care and maximize pt's functional abilities to improve quality of life. The following goals were discussed with the patient and patient agrees with them as to be addressed in the treatment plan. The patient's rehab potential is Good.     Anticipated barriers to occupational therapy: Severity and length of time since onset.    Pt has no cultural, educational or language barriers to learning provided.      The following goals were discussed with the patient and patient agrees with them as to be addressed in the treatment plan.     Goals:     LTG: (8 Weeks)   Pt will increase toilet transfers to min assist.  Pt with be able to don UE garments with min assist and LE garments with mod assist.  Pt will increase RUE function to be able to transfer supine to sit CGA from right side.    STG: (4 Weeks)   Pt will increase toilet transfers to mod assist stand pivot.  Pt will increase UE dressing to CGA and LE dressing to mod assist.  Pt will increase RUE function to be able to transfer supine to sit with min assist from right side.          PLAN   Plan of Care Certification: 7/26/2024 to 10/25/24.     Outpatient Occupational Therapy 2 times weekly for 7 weeks to include the following interventions: Manual therapy/joint mobilizations, Modalities for pain management, Therapeutic exercises/activities., Strengthening, Transfer training, ADL Training, Neuro reeducation and education.    Time In: 1500  Time Out: 1550  Total Appointment Time (timed & untimed codes): 50 minutes      BEN James        I CERTIFY THE NEED FOR THESE SERVICES FURNISHED UNDER THIS PLAN OF TREATMENT AND WHILE UNDER MY CARE  Physician's comments:      Physician's Signature: ___________________________________________________

## 2024-08-05 ENCOUNTER — CLINICAL SUPPORT (OUTPATIENT)
Dept: REHABILITATION | Facility: HOSPITAL | Age: 44
End: 2024-08-05
Payer: OTHER GOVERNMENT

## 2024-08-05 DIAGNOSIS — I69.322 DYSARTHRIA AS LATE EFFECT OF CEREBELLAR CEREBROVASCULAR ACCIDENT (CVA): ICD-10-CM

## 2024-08-05 DIAGNOSIS — I67.89 CEREBROVASCULAR DISEASE, ACUTE: Primary | ICD-10-CM

## 2024-08-05 DIAGNOSIS — I69.320 APHASIA AS LATE EFFECT OF CEREBROVASCULAR ACCIDENT (CVA): ICD-10-CM

## 2024-08-05 PROCEDURE — 92507 TX SP LANG VOICE COMM INDIV: CPT

## 2024-08-06 ENCOUNTER — CLINICAL SUPPORT (OUTPATIENT)
Dept: REHABILITATION | Facility: HOSPITAL | Age: 44
End: 2024-08-06
Payer: OTHER GOVERNMENT

## 2024-08-06 DIAGNOSIS — I67.89 CEREBROVASCULAR DISEASE, ACUTE: Primary | ICD-10-CM

## 2024-08-06 PROCEDURE — 97110 THERAPEUTIC EXERCISES: CPT

## 2024-08-06 PROCEDURE — 97530 THERAPEUTIC ACTIVITIES: CPT

## 2024-08-06 PROCEDURE — 97112 NEUROMUSCULAR REEDUCATION: CPT

## 2024-08-07 ENCOUNTER — CLINICAL SUPPORT (OUTPATIENT)
Dept: REHABILITATION | Facility: HOSPITAL | Age: 44
End: 2024-08-07
Payer: OTHER GOVERNMENT

## 2024-08-07 DIAGNOSIS — I69.320 APHASIA AS LATE EFFECT OF CEREBROVASCULAR ACCIDENT (CVA): ICD-10-CM

## 2024-08-07 DIAGNOSIS — I67.89 CEREBROVASCULAR DISEASE, ACUTE: Primary | ICD-10-CM

## 2024-08-07 DIAGNOSIS — I69.322 DYSARTHRIA AS LATE EFFECT OF CEREBELLAR CEREBROVASCULAR ACCIDENT (CVA): ICD-10-CM

## 2024-08-07 PROCEDURE — 92507 TX SP LANG VOICE COMM INDIV: CPT

## 2024-08-08 ENCOUNTER — CLINICAL SUPPORT (OUTPATIENT)
Dept: REHABILITATION | Facility: HOSPITAL | Age: 44
End: 2024-08-08
Payer: OTHER GOVERNMENT

## 2024-08-08 DIAGNOSIS — I67.89 CEREBROVASCULAR DISEASE, ACUTE: Primary | ICD-10-CM

## 2024-08-08 PROCEDURE — 97110 THERAPEUTIC EXERCISES: CPT

## 2024-08-08 PROCEDURE — 97530 THERAPEUTIC ACTIVITIES: CPT

## 2024-08-08 PROCEDURE — 97112 NEUROMUSCULAR REEDUCATION: CPT

## 2024-08-12 ENCOUNTER — CLINICAL SUPPORT (OUTPATIENT)
Dept: REHABILITATION | Facility: HOSPITAL | Age: 44
End: 2024-08-12
Payer: OTHER GOVERNMENT

## 2024-08-12 DIAGNOSIS — I69.320 APHASIA AS LATE EFFECT OF CEREBROVASCULAR ACCIDENT (CVA): ICD-10-CM

## 2024-08-12 DIAGNOSIS — I67.89 CEREBROVASCULAR DISEASE, ACUTE: Primary | ICD-10-CM

## 2024-08-12 DIAGNOSIS — I69.322 DYSARTHRIA AS LATE EFFECT OF CEREBELLAR CEREBROVASCULAR ACCIDENT (CVA): ICD-10-CM

## 2024-08-12 PROCEDURE — 92507 TX SP LANG VOICE COMM INDIV: CPT

## 2024-08-12 NOTE — PROGRESS NOTES
Speech and Language Therapy   Outpatient Progress Note  Date:  8/12/2024     Name: Yong Stallings   MRN: [id number]   Therapy Diagnosis:   Encounter Diagnoses   Name Primary?    Cerebrovascular disease, acute Yes    Aphasia as late effect of cerebrovascular accident (CVA)     Dysarthria as late effect of cerebellar cerebrovascular accident (CVA)      Physician: Mayito Langston MD  Physician Orders: speech eval and treat  Medical Diagnosis: CVA    Visit #/Visits authorized: 8/15  Date of Evaluation:  07/23/24  Insurance Authorization Period: 180 days  Plan of Care:      7/22/2024 to 10/22/2024   Extended POC:  n/a      UNTIMED  Procedure Min.   {Speech- Language- Voice Therapy  45min / 1 unit           Total Untimed Units: 1  Charges Billed/# of units: 1  Time In:  13:00  Time Out:  13:45   Total Billable Time: 45 min / 1 unit     Precautions: Standard and Fall    Subjective:   Pt reports: Pt appeared in good spirits initially but quickly reported frustration with his dad via gestures and report from sitter. Pt participated well throughout but reported and demonstrated frustration with verbal ability.      Objective:       Short term goals:     Pt will participate in standardized assessment with BDAE.   met 07/24/24   Pt will complete automatic language tasks at mod level with >90% intelligibility and min cues. Pt completed the following automatic language tasks:  1-10: 7x independently; initiation/phonemic cues required x3  10-1: 2x independently; initiation cues required x3   Pt will verbalize name and personally relevant information via naming or wh-questions for basic communication with min phonemic cues.  Pt responded to questions with the following targeted information:  Name: x5 independently; x10+ with initiation cues  Favorite foods/restaurants: 100% acc with phonemic cues + carrier phrases   Pt will label personally relevant items with 75% acc min cues.  Pt labeled CVC pictures with word cue with CP + phonemic  cue required >50% of the time; 100% acc with cues   Pt will comprehend personally relevant information at short story length with 75% acc no cues.      Pt will tolerate NMES to right buccal/face for improved ROM and sensation for control of secretions and increased ROM. Pt/family forgot to shave, will attempt again next session        Long Term Goal: Pt will communicate basic wants and needs via any modality with trained caregivers.      Articulatory kinematic drills completed with errorless learning. Pt completed CV x4 units. Single oral motor tasks with mirror completed with max cues required initially, improved to minimal cues by end of drills.     Patient Education/Response:   SLP educated Pt and family on need for functional language practice.    Written Home Exercises Provided: con't automatic language tasks of 1-10, 10-1, and songs  Exercises were reviewed and Yong was able to demonstrate them prior to the end of the session.  Yong demonstrated good  understanding of the education provided.         Assessment:   Yong is progressing well towards his goals.  Current goals remain appropriate. Goals to be updated as necessary. Groping in isolated oral movement noted.     Pt prognosis is Fair. Pt will continue to benefit from skilled outpatient speech and language therapy to address the deficits listed in the problem list on initial evaluation, provide pt/family education and to maximize pt's level of independence in the home and community environment.     KT NOMS (Functional Communication Measures):  KT NOMS: EVAL (... ) CURRENT   Attention: 4 4   Memory: 4 4   Motor Speech: 3 3   Readin 4   Spoken Language Comprehension: 4 4   Spoken Language Expression: 2 2   Swallowin 7      Barriers to Therapy: prolonged period since CVA  Pt's spiritual, cultural and educational needs considered and pt agreeable to plan of care and goals.    Plan:   Continue POC with focus on functional communication of basic  wants/needs.       Kate Olvera MA, CCC-SLP  08/12/2024

## 2024-08-13 ENCOUNTER — CLINICAL SUPPORT (OUTPATIENT)
Dept: REHABILITATION | Facility: HOSPITAL | Age: 44
End: 2024-08-13
Payer: OTHER GOVERNMENT

## 2024-08-13 DIAGNOSIS — I67.89 CEREBROVASCULAR DISEASE, ACUTE: Primary | ICD-10-CM

## 2024-08-13 PROCEDURE — 97530 THERAPEUTIC ACTIVITIES: CPT

## 2024-08-14 ENCOUNTER — CLINICAL SUPPORT (OUTPATIENT)
Dept: REHABILITATION | Facility: HOSPITAL | Age: 44
End: 2024-08-14
Payer: OTHER GOVERNMENT

## 2024-08-14 DIAGNOSIS — I69.322 DYSARTHRIA AS LATE EFFECT OF CEREBELLAR CEREBROVASCULAR ACCIDENT (CVA): ICD-10-CM

## 2024-08-14 DIAGNOSIS — I67.89 CEREBROVASCULAR DISEASE, ACUTE: Primary | ICD-10-CM

## 2024-08-14 DIAGNOSIS — I69.320 APHASIA AS LATE EFFECT OF CEREBROVASCULAR ACCIDENT (CVA): ICD-10-CM

## 2024-08-14 PROCEDURE — 92507 TX SP LANG VOICE COMM INDIV: CPT

## 2024-08-14 NOTE — PROGRESS NOTES
Speech and Language Therapy   Outpatient Progress Note  Date:  8/14/2024     Name: Yong Stallings   MRN: [id number]   Therapy Diagnosis:   Encounter Diagnoses   Name Primary?    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   {Speech- Language- Voice Therapy  60min / 1 unit           Total Untimed Units: 1  Charges Billed/# of units: 1  Time In:  13:00  Time Out:  14:00   Total Billable Time: 45 min / 1 unit     Precautions: Standard and Fall    Subjective:   Pt reports: Pt was in good spirits and participated well. Pt communicated feeling proud of efforts and progress noted today.     SLP spoke with sitter both in private and in front of patient. Behavioral challenges at home are reported. SLP provided education on communication strategies, likely difficulty taking perspective, and possible emotional regulation difficulty post-CVA. SLP to speak with father tomorrow while Pt is here for PT/OT.       Objective:       Short term goals:     Pt will participate in standardized assessment with BDAE.   met 07/24/24   Pt will complete automatic language tasks at mod level with >90% intelligibility and min cues. Pt completed the following automatic language tasks:  1-10: 5/5x independently  10-1: 3/3x independently; initiation cues required x3   Pt will verbalize name and personally relevant information via naming or wh-questions for basic communication with min phonemic cues.  Pt responded to questions with the following targeted information:  Name: x5 independently; x10+ with initiation cues  Favorite  foods/restaurants: 100% acc with phonemic cues + carrier phrases, 1x independently    Pt will label personally relevant items with 75% acc min cues.  Pt labeled function words via writing with need for copying 100% of the time. Sementic paraphasias noted x1.    Pt will comprehend personally relevant information at short story length with 75% acc no cues.  Pt demonstrated comprehension of education and conversation with sitter via follow up questions, cues required.     Pt will tolerate NMES to right buccal/face for improved ROM and sensation for control of secretions and increased ROM. Pt tolerated NMES to left with 2.5mA and 7.0mA on right for 40min. Improved sensory and motor response at reduced mA today. Pt masticated on right and completed OME with stim on. No loss of saliva noted throughout session.        Long Term Goal: Pt will communicate basic wants and needs via any modality with trained caregivers.        Patient Education/Response:   See above education information.     Written Home Exercises Provided: con't automatic language tasks of 1-10, 10-1, and songs  Exercises were reviewed and Yong was able to demonstrate them prior to the end of the session.  Yong demonstrated good  understanding of the education provided.         Assessment:   Yong is progressing well towards his goals.  Current goals remain appropriate. Goals to be updated as necessary. Groping in isolated oral movement noted.     Pt prognosis is Fair. Pt will continue to benefit from skilled outpatient speech and language therapy to address the deficits listed in the problem list on initial evaluation, provide pt/family education and to maximize pt's level of independence in the home and community environment.     KT NOMS (Functional Communication Measures):  KT NOMS: EVAL (... ) CURRENT   Attention: 4 4   Memory: 4 4   Motor Speech: 3 3   Readin 4   Spoken Language Comprehension: 4 4   Spoken Language Expression: 2 2   Swallowin  7      Barriers to Therapy: prolonged period since CVA  Pt's spiritual, cultural and educational needs considered and pt agreeable to plan of care and goals.    Plan:   Continue POC with focus on functional communication of basic wants/needs.       Kate Olvera MA, CCC-SLP  08/14/2024

## 2024-08-15 ENCOUNTER — CLINICAL SUPPORT (OUTPATIENT)
Dept: REHABILITATION | Facility: HOSPITAL | Age: 44
End: 2024-08-15
Payer: OTHER GOVERNMENT

## 2024-08-15 DIAGNOSIS — I67.89 CEREBROVASCULAR DISEASE, ACUTE: Primary | ICD-10-CM

## 2024-08-15 PROCEDURE — 97112 NEUROMUSCULAR REEDUCATION: CPT

## 2024-08-15 PROCEDURE — 97530 THERAPEUTIC ACTIVITIES: CPT

## 2024-08-15 NOTE — PROGRESS NOTES
Occupational Therapy  Occupational Therapy Outpatient Progress Note      Date: 2024  Name: Yong Stallings  Clinic Number: [id number]  : 23  Visit Number: 4      Subjective     Pt initially appeared in good spirits, but became agitated when asked how the oculomotor ex's went over the weekend.  Therapist had difficulty understanding pt, but appears pt upset with his father.  OT will speak with STPierre, to help with clarification.     Patient's response to therapy: Yong tolerated session well with good effort.     Objective     Current condition: Yong demonstrates severe right hemiplegia effecting functional transfers, ADLs, visual perception, and overall quality of life secondary to a CVA.      TREATMENT  Principle of treatment/treatment today: Transfer training, core strengthening, and dynamic sitting balance training.     Therapeutic Activity:  Began with transfer training wc>mat.  Pt able to scoot forward in wc with mod assist with right side.  Pt educated on pivoting on left foot with verbal and tactile cues.  Pt transferred sit to stand mod assist then required max assist to pivot to left.  Transfer training sit to supine max assist to right side.  Performed core stretches with knees bent followed by BLE hamstring and piriformis stretches.  Performed right scapular mobilization followed by anterior capsular stretches.  Performed slow progressive stretch to elbow,  wrist, and digit flexors to neutral position.  Transferred into left side lying min assist and performed right scapular mobilization/AAROM using a mirror for visual feedback.  Only noted trace activity in scapular retraction.  Transfer training supine to sit max assist from right.  Worked on core strength and dynamic balance, lateral weight shift onto elbow CGA to left min tactile assist to right 10 reps ea.  Performed mini sit-ups from 30 degree wedge with min tactile assist 10 reps.  Transfer training performed mat <> wc.  Pt required min  assist to scoot forward, max assist to stand, then max assist to pivot to right.  Noted pt attempting to pivot left foot this transfer.       Education:  Reinforced oculomotor ex's to be perform daily.      Time In: 1230  Time Out: 1315  Total Appointment Time (timed & untimed codes): 45 minutes   Treatment time: Functional activities 45 minutes  Neuro re-education   minutes      Assessment     Summary/Analysis of session: Pt seen in clinic setting with father present.  PT not present today, tx. focused on mat activities.  Severe right hemiplegia with apraxia continues to hamper transfers and mobility.  Recommend continuing PLC to address functional deficits.    Progress toward previous goals: Continue STG/LTG   Goals:      LTG: (8 Weeks)   Pt will increase toilet transfers to min assist. (Progressing)  Pt with be able to don UE garments with min assist and LE garments with mod assist. (Progressing)  Pt will increase RUE function to be able to transfer supine to sit CGA from right side. (Progressing)     STG: (4 Weeks)   Pt will increase toilet transfers to mod assist stand pivot. (Progressing)  Pt will increase UE dressing to CGA and LE dressing to mod assist. (Progressing)  Pt will increase RUE function to be able to transfer supine to sit with min assist from right side. (Progressing)    Plan   Next session to focus on core strength/mobility, dynamic sitting/standing bal training, and education on HEP.    Follow Up  Follow up in: 2 days

## 2024-08-16 ENCOUNTER — CLINICAL SUPPORT (OUTPATIENT)
Dept: REHABILITATION | Facility: HOSPITAL | Age: 44
End: 2024-08-16
Payer: OTHER GOVERNMENT

## 2024-08-16 DIAGNOSIS — I67.89 CEREBROVASCULAR DISEASE, ACUTE: Primary | ICD-10-CM

## 2024-08-16 DIAGNOSIS — I69.322 DYSARTHRIA AS LATE EFFECT OF CEREBELLAR CEREBROVASCULAR ACCIDENT (CVA): ICD-10-CM

## 2024-08-16 DIAGNOSIS — I69.320 APHASIA AS LATE EFFECT OF CEREBROVASCULAR ACCIDENT (CVA): ICD-10-CM

## 2024-08-16 PROCEDURE — 92507 TX SP LANG VOICE COMM INDIV: CPT

## 2024-08-16 NOTE — PROGRESS NOTES
Occupational Therapy  Occupational Therapy Outpatient Progress Note      Date: 8/15/2024  Name: Yong Stallings  Clinic Number: [id number]  : 23  Visit Number: 5      Subjective     Pt in good spirits and motivated for session.    Patient's response to therapy: Yong tolerated session fair with good effort.  Did note pt very fatigued after 45 mins of activity.    Objective     Current condition: Yong demonstrates severe right hemiplegia effecting functional transfers, ADLs, visual perception, and overall quality of life secondary to a CVA.      TREATMENT  Principle of treatment/treatment today: Transfer training, core strengthening, neuromotor re-ed, and dynamic sitting balance training.     Therapeutic Activity:  Began with transfer training wc>mat.  Pt able to scoot forward in wc with mod assist with right side.  Reviewed pivoting on left foot with verbal and tactile cues.  Pt transferred sit to stand max assist then required max assist to pivot to left.  Did note pt attempting to pivot left foot once weight unloaded.  Transfer training sit to supine max assist to right side.  Performed core stretches with knees bent followed by BLE hamstring and piriformis stretches.  Performed right scapular mobilization followed by anterior capsular stretches.  Performed slow progressive stretch to elbow,  wrist, and digit flexors to neutral position.  Transfer training supine to sit max assist from right.  Pt attempted to utilize LUE with verbal cuing.  Performed reaching and placing activity across midline with graded clothes pins. Activity focused on visual scanning, core strengthening, and RUE weight bearing.  Pt required max assist to support RUE in weight bearing and verbal cues to scan to right when placing clothes pin.  Transfer training performed sit to stand from elevated seat using rail with LUE.  Pt performed 10 reps, first 7 with mod assist then last 3 with maximal assist and verbal cuing to sequence mechanics.   Transfer training performed mat <> wc.  Pt required mod assist to scoot forward, max assist to stand, then max assist to pivot to right.  Noted pt attempting to pivot left foot with vc's and weight shifting transfer.       Education:  Discussed with CG strategies to improve transfers.      Time In: 1215  Time Out: 1315  Total Appointment Time (timed & untimed codes): 60 minutes   Treatment time: Functional activities 45 minutes  Neuro re-education 15 minutes      Assessment     Summary/Analysis of session: Pt seen in clinic setting with father present.  PT not present today, tx. focused on mat activities.  Severe right hemiplegia with apraxia continues to hamper transfers and mobility.  Recommend continuing PLC to address functional deficits.    Progress toward previous goals: Continue STG/LTG   Goals:      LTG: (8 Weeks)   Pt will increase toilet transfers to min assist. (Progressing)  Pt with be able to don UE garments with min assist and LE garments with mod assist. (Progressing)  Pt will increase RUE function to be able to transfer supine to sit CGA from right side. (Progressing)     STG: (4 Weeks)   Pt will increase toilet transfers to mod assist stand pivot. (Progressing)  Pt will increase UE dressing to CGA and LE dressing to mod assist. (Progressing)  Pt will increase RUE function to be able to transfer supine to sit with min assist from right side. (Progressing)    Plan   Next session to focus on core strength/mobility, dynamic sitting/standing bal training, and education on HEP.    Follow Up  Follow up in: 5 days

## 2024-08-16 NOTE — PROGRESS NOTES
Speech and Language Therapy   Outpatient Progress Note  Date:  2024     Name: Yong Stallings   MRN: [id number]   Therapy Diagnosis:   No diagnosis found.    Physician: Mayito Langston MD  Physician Orders: speech eval and treat  Medical Diagnosis: CVA    Visit #/Visits authorized: 10/15  Date of Evaluation:  24  Insurance Authorization Period: 180 days  Plan of Care:      2024 to 10/22/2024   Extended POC:  n/a      UNTIMED  Procedure Min.   {Speech- Language- Voice Therapy  60min / 1 unit           Total Untimed Units: 1  Charges Billed/# of units: 1  Time In:  13:00  Time Out:  14:00   Total Billable Time: 45 min / 1 unit     Precautions: Standard and Fall    Subjective:     Pt reports: Pt was in good spirits and participated well.       Objective:       Short term goals:     Pt will participate in standardized assessment with BDAE.   met 24   Pt will complete automatic language tasks at mod level with >90% intelligibility and min cues. Pt completed the following automatic language tasks:  1-10: 2/10x independently  10-1: 3/10x independently  ABCs:  independently  Songs with written cue: <50% acc   Carrier phrases with pictures and writin% no cues   Pt will verbalize name and personally relevant information via naming or wh-questions for basic communication with min phonemic cues.  Pt responded to questions with the following targeted information:  Name: 2/10+x independently  Favorite foods/restaurants: Phonemic Cues (PC) required   Pt will label personally relevant items with 75% acc min cues.  Pt labeled function words with PC required   Pt will comprehend personally relevant information at short story length with 75% acc no cues.  Pt demonstrated comprehension of conversation via follow up questions with no cues required.     Pt will tolerate NMES to right buccal/face for improved ROM and sensation for control of secretions and increased ROM.         Long Term Goal: Pt will  communicate basic wants and needs via any modality with trained caregivers.      Articulatory Kinematic drills completed with CV x4 units with max cues required.     Patient Education/Response:   See above education information.     Written Home Exercises Provided: con't automatic language tasks - written stim provided   Exercises were reviewed and Yong was able to demonstrate them prior to the end of the session.  Yong demonstrated good  understanding of the education provided.         Assessment:   Yong is progressing well towards his goals.  Current goals remain appropriate. Goals to be updated as necessary. Groping in isolated oral movement noted.     Pt prognosis is Fair. Pt will continue to benefit from skilled outpatient speech and language therapy to address the deficits listed in the problem list on initial evaluation, provide pt/family education and to maximize pt's level of independence in the home and community environment.     KT NOMS (Functional Communication Measures):  KT NOMS: EVAL (... ) CURRENT   Attention: 4 4   Memory: 4 4   Motor Speech: 3 3   Readin 4   Spoken Language Comprehension: 4 4   Spoken Language Expression: 2 2   Swallowin 7      Barriers to Therapy: prolonged period since CVA  Pt's spiritual, cultural and educational needs considered and pt agreeable to plan of care and goals.    Plan:   Continue POC with focus on functional communication of basic wants/needs.       Kate Olvera MA, CCC-SLP  2024

## 2024-08-20 ENCOUNTER — CLINICAL SUPPORT (OUTPATIENT)
Dept: REHABILITATION | Facility: HOSPITAL | Age: 44
End: 2024-08-20
Payer: OTHER GOVERNMENT

## 2024-08-20 DIAGNOSIS — I69.320 APHASIA AS LATE EFFECT OF CEREBROVASCULAR ACCIDENT (CVA): Primary | ICD-10-CM

## 2024-08-20 DIAGNOSIS — I69.322 DYSARTHRIA AS LATE EFFECT OF CEREBELLAR CEREBROVASCULAR ACCIDENT (CVA): ICD-10-CM

## 2024-08-20 DIAGNOSIS — I67.89 CEREBROVASCULAR DISEASE, ACUTE: Primary | ICD-10-CM

## 2024-08-20 DIAGNOSIS — I67.89 CEREBROVASCULAR DISEASE, ACUTE: ICD-10-CM

## 2024-08-20 PROCEDURE — 92507 TX SP LANG VOICE COMM INDIV: CPT

## 2024-08-20 PROCEDURE — 97530 THERAPEUTIC ACTIVITIES: CPT

## 2024-08-20 PROCEDURE — 97110 THERAPEUTIC EXERCISES: CPT

## 2024-08-20 NOTE — PROGRESS NOTES
Speech and Language Therapy   Outpatient Progress Note  Date:  8/20/2024     Name: Yong Stallings   MRN: [id number]   Therapy Diagnosis:   Encounter Diagnoses   Name Primary?    Aphasia as late effect of cerebrovascular accident (CVA) Yes    Dysarthria as late effect of cerebellar cerebrovascular accident (CVA)     Cerebrovascular disease, acute        Physician: Mayito Langston MD  Physician Orders: speech eval and treat  Medical Diagnosis: CVA    Visit #/Visits authorized: 12 / 15  Date of Evaluation:  07/23/24  Insurance Authorization Period: 180 days  Plan of Care:      7/22/2024 to 10/22/2024   Extended POC:  n/a      UNTIMED  Procedure Min.   {Speech- Language- Voice Therapy  40min / 1 unit           Total Untimed Units: 1  Charges Billed/# of units: 1  Time In:  11:45  Time Out:  12:25  Total Billable Time: 40 min / 1 unit     Precautions: Standard and Fall    Subjective:     Pt reports: Pt was in good spirits and participated well. SLP and family noted improved spontaneous phrase level speech at conversational level, Pt agreed but was less positive about improvements.      Objective:       Short term goals:     Pt will participate in standardized assessment with BDAE.   met 07/24/24   Pt will complete automatic language tasks at mod level with >90% intelligibility and min cues.    Pt will verbalize name and personally relevant information via naming or wh-questions for basic communication with min phonemic cues.  Pt responded to questions with the following targeted information:  Name: 4/10+x independently  Favorite foods/restaurants: 0/10+ independently.   Phonemic Cues (PC) result in 100% acc.  Errorless learning with names completed x50+ with written and visual articulatory stim, min cues required.   Pt will label personally relevant items with 75% acc min cues.     Pt will comprehend personally relevant information at short story length with 75% acc no cues.  Pt demonstrated comprehension of complex read  information with mod cues required.     Pt will tolerate NMES to right buccal/face for improved ROM and sensation for control of secretions and increased ROM. Pt tolerated NMES to left with 2.5mA and 6.0mA on right for 35min. Improved sensory and motor response at reduced mA again today - initial sensation reported on right at 3.5mA. Pt masticated on right and completed OME with stim on. No loss of saliva noted throughout session.         Long Term Goal: Pt will communicate basic wants and needs via any modality with trained caregivers.      Pt participated in conversation re: subject of interest (politics) with spontaneous phrase length productions noted x3, and x5 with motor speech cues.      Patient Education/Response:   Education re: progress and time required for progress.     Written Home Exercises Provided: con't automatic language tasks   Exercises were reviewed and Yong was able to demonstrate them prior to the end of the session.  Yong demonstrated good  understanding of the education provided.         Assessment:   Yong is progressing well towards his goals.  Current goals remain appropriate. Goals to be updated as necessary. Improved phrase length responses noted by SLP and family at home.    Pt prognosis is Fair. Pt will continue to benefit from skilled outpatient speech and language therapy to address the deficits listed in the problem list on initial evaluation, provide pt/family education and to maximize pt's level of independence in the home and community environment.     KT NOMS (Functional Communication Measures):  KT NOMS: EVAL (... ) CURRENT   Attention: 4 4   Memory: 4 4   Motor Speech: 3 3   Readin 4   Spoken Language Comprehension: 4 4   Spoken Language Expression: 2 2   Swallowin 7      Barriers to Therapy: prolonged period since CVA  Pt's spiritual, cultural and educational needs considered and pt agreeable to plan of care and goals.    Plan:   Continue POC with focus on  functional communication of basic wants/needs.       Kate Olvera MA, CCC-SLP  08/20/2024

## 2024-08-20 NOTE — PROGRESS NOTES
"                         [facility] Outpatient Physical Therapy                              Daily Treatment Note          Name: Yong Stallings      Therapy Diagnosis:   Encounter Diagnosis   Name Primary?    Cerebrovascular disease, acute Yes     Physician: Mayito Langston MD    Visit Date: 8/20/2024        Time In: 1222  Time Out: 1322  Total Billable Time: 60 minutes    Precautions: Fall        Subjective     Pt reports: no new complaints.  Pt had just finished speech therapy and appeared motivated to participate.  PT did note that pt was seated in [facility]. Pt reported that the "likes" this chair.          Pain: 0/10        Objective     Yong received therapeutic exercises to develop strength, endurance, ROM, flexibility, posture, and core stabilization for 10 minutes including:      Exercise Sets Reps Comments   BLE supine stretching/ROM 1 10 PT assisted pt with manual LE stretching and gentle PROM.  PT assisted with B knee flexion/extension, B hip flexion, B hip IR/ER, B adductor and HS stretching in preparation for mobility. Continued joint instability noted in the L knee with terminal extension.     Pt was also assisted with 5 reps of LTR and a stretch provided at end range.                      Yong participated in dynamic functional therapeutic activities to improve functional performance for 50  minutes, including:       Assist Level Assistive Device Comments    Bed Mobility ModA-maxA  Pt mobilized from sitting at the edge of the mat into supine.  Pt required assistance to control descent of his trunk.  Pt crossed the ankles to allow the LLE to assist the hemiplegic RLE, but still required assistance to bring his feet onto the mat.  Upon returning to sitting, pt's RLE was assisted off the edge of the mat. Pt was able to mobilize his LLE toward the edge of the mat.  OT provided pt with B hands and pt performed a crunch to reach upright sitting.      Sit to stand/Stand " to sit Lola-modA  Pt performed 2 trials of standing with OT assisting anteriorly. Pt wrapped his LUE around Ot's shoulders.  OT blocked pt's R knee to prevent buckling.  PT made adjustments to pt's sitting surface while pt stood briefly with OT.      Ten trials of sit to stand/stand to sit transitions were performed from the edge of the mat.  Pt sat on two blue foam pads to raise his sitting surface.  Pt utilized the railing positioned to his L to assist himself into standing.  OT was positioned anteriorly, blocking the R knee and assisting as needed. PT was positioned to pt's R side and offered cueing at the pelvis as needed.  Pt initially required modA to reach standing, but this progressed to Lola for the final 6-7 reps. Initial cueing provided for increased anterior weight shifting.  Good carry over noted.  Focus also placed on increased hip hinging prior to descent.  Good control noted during 6 of the 10 reps.      One additional stand performed from the low surface of the mat without the blue foam pads. Pt required slightly more assistance, but was able to stand much more independently than during previous sessions.     Balance Training SBA-modA  Pt performed dynamic sitting balance at the edge of the mat.  Pt was assisted to sit on a round balance board placed onto a 2 inch step.  While seated on this unstable surface, pt utilized visual feedback in the mirror for posture.  LUE multidirectional reaching was performed.  OT provided a target and PT supervised pt at the edge of the mat, providing assisted with balance and cueing for posture as needed.  Pt demonstrated increased difficulty reaching for low targets to his R side.  Pt was required to cross midline to reach for these points.  Lola-ModA provided to correct occasional losses of balance in this direction.  Pt also clasped his hands together and performed several reps of multi-directional reaching.  Lastly, OT provided perturbations at hip level. Pt was  encouraged to maintain sitting balance without use of his LUE.     Wheelchair Mobility Lola-modA  Pt was assisted with propelling his manual WC approximately 30 ft.  Pt utilized his LUE and LLE.  Pt with difficulty motor planning and coordinating simultaneous movement of these two limbs.  PT provided assistance with steering and provided cueing for increased use of the left leg.     Stair Climbing      Car Transfer      Other:  Lola-modA  Lateral sliding board t/f performed from the WC to the low mat and from the low mat back to the WC.  When transitioning to the mat, pt scooted toward his L side.  Pt was assisted with initiation of the scoot, but was able to complete the remainder of the transition with only Lola.  Upon returning to the WC, pt had to transition to his R side. Increased assistance required to mount the board and slide uphill into the chair.  Assistance was provided with placing the sliding board, but pt was able to remove the board on his own.  With increased practice with this skill, this style of transfer could greatly reduce caregiver burden within the home environment, while also allowing for increased independence for the pt.           Assessment     Pt tolerated session well and was motivated to participate in all activities.  Co-treatment performed with OT due to the need for two sets of skilled hands for safety.  Pt has demonstrated good progress toward his goals since his previous PT sessions.  As documented above, [facility]  With the removable armrests, pt was able to practice lateral sliding board transfers.  Will continue to practice this skill during future treatments, educating pt's father and caregiver as appropriate.      Yong Is progressing well towards his goals.   Pt prognosis is Fair.     Pt will continue to benefit from skilled outpatient physical therapy to address the deficits listed in the problem list box on initial evaluation, provide pt/family  education and to maximize pt's level of independence in the home and community environment.     Pt's spiritual, cultural and educational needs considered and pt agreeable to plan of care and goals.    Anticipated barriers to physical therapy: decreased insight into deficits, length of time since CVA    Goals:     See initial evaluation        Plan     Will plan to continue to see pt for PT services in coordination with OT in an effort to maximize pt's independence with basic mobility and reduce caregiver burden.      Cliff Hollis, PT, DPT

## 2024-08-21 ENCOUNTER — CLINICAL SUPPORT (OUTPATIENT)
Dept: REHABILITATION | Facility: HOSPITAL | Age: 44
End: 2024-08-21
Payer: OTHER GOVERNMENT

## 2024-08-21 DIAGNOSIS — I69.320 APHASIA AS LATE EFFECT OF CEREBROVASCULAR ACCIDENT (CVA): Primary | ICD-10-CM

## 2024-08-21 DIAGNOSIS — I67.89 CEREBROVASCULAR DISEASE, ACUTE: ICD-10-CM

## 2024-08-21 PROCEDURE — 92507 TX SP LANG VOICE COMM INDIV: CPT

## 2024-08-21 NOTE — PROGRESS NOTES
Speech and Language Therapy   Outpatient Progress Note  Date:  8/21/2024     Name: Yong Stallings   MRN: [id number]   Therapy Diagnosis:   Encounter Diagnoses   Name Primary?    Aphasia as late effect of cerebrovascular accident (CVA) Yes    Cerebrovascular disease, acute        Physician: Mayito Langston MD  Physician Orders: speech eval and treat  Medical Diagnosis: CVA    Visit #/Visits authorized: 13/15  Date of Evaluation:  07/23/24  Insurance Authorization Period: 180 days  Plan of Care:      7/22/2024 to 10/22/2024   Extended POC:  n/a      UNTIMED  Procedure Min.   {Speech- Language- Voice Therapy  50min / 1 unit           Total Untimed Units: 1  Charges Billed/# of units: 1  Time In:  12:35  Time Out:  1:25  Total Billable Time: 50 min / 1 unit     Precautions: Standard and Fall    Subjective:     Pt reports: Pt was in good spirits and participated well. Frustration with difficulty noted towards end of session.       Objective:       Short term goals:     Pt will participate in standardized assessment with BDAE.   met 07/24/24   Pt will complete automatic language tasks at mod level with >90% intelligibility and min cues. Pt completed the following automatic language tasks:  1-10: 4/10x independently  10-1: 1/5x independently  ABCs: 20-21/26 independently  Pt completed all above tasks with 100% acc with PC   Pt will verbalize name and personally relevant information via naming or wh-questions for basic communication with min phonemic cues.  Pt responded to questions with the following targeted information:  Name: 4/10+x independently  Favorite foods/restaurants: 3/10+ independently.   Phonemic Cues (PC) result in 100% acc.  Errorless learning with names completed x25+ with written and visual articulatory stim, min cues required.   Pt will label personally relevant items with 75% acc min cues.  Pt labeled CV, VC, and CVC words with 85% acc with carrier phrases, increase to 100% with PC. Unable to label any  items without cues.   Pt will comprehend personally relevant information at short story length with 75% acc no cues.  Pt demonstrated comprehension of complex read information via yes/no questions with 85% acc.     Pt will tolerate NMES to right buccal/face for improved ROM and sensation for control of secretions and increased ROM.         Long Term Goal: Pt will communicate basic wants and needs via any modality with trained caregivers.      Pt participated in conversation re: subject of interest (news stories) with spontaneous phrase length productions noted x2.      Patient Education/Response:   SLP again educated Pt re: progress and time required for progress due to frustrations.    Written Home Exercises Provided: con't automatic language tasks   Exercises were reviewed and Yong was able to demonstrate them prior to the end of the session.  Yong demonstrated good  understanding of the education provided.         Assessment:   Yong is progressing well towards his goals.  Current goals remain appropriate. Goals to be updated as necessary.    Pt prognosis is Fair. Pt will continue to benefit from skilled outpatient speech and language therapy to address the deficits listed in the problem list on initial evaluation, provide pt/family education and to maximize pt's level of independence in the home and community environment.     KT NOMS (Functional Communication Measures):  KT NOMS: EVAL (... ) CURRENT   Attention: 4 4   Memory: 4 4   Motor Speech: 3 3   Readin 4   Spoken Language Comprehension: 4 4   Spoken Language Expression: 2 2   Swallowin 7      Barriers to Therapy: prolonged period since CVA  Pt's spiritual, cultural and educational needs considered and pt agreeable to plan of care and goals.    Plan:   Continue POC with focus on functional communication of basic wants/needs.       Kate Olvera MA, CCC-SLP  2024

## 2024-08-21 NOTE — PROGRESS NOTES
Occupational Therapy  Occupational Therapy Outpatient Progress Note      Date: 2024  Name: Yong Stallings  Clinic Number: [id number]  : 23  Visit Number: 6      Subjective     Pt in good spirits and motivated for session.    Patient's response to therapy: Yong tolerated session fair with good effort.  Did note improvement in over all endurance and not as fatigued at end of session.    Objective     Current condition: Yong demonstrates severe right hemiplegia effecting functional transfers, ADLs, visual perception, and overall quality of life secondary to a CVA.      TREATMENT  Principle of treatment/treatment today: Transfer training, core strengthening, neuromotor re-ed, and dynamic sitting/standing balance training.     Therapeutic Activity:  Noted pt in new manual wheelchair.  Performed cotx session with PT secondary to number of skilled hands required to achieve maximal benefit.  Began with transfer training wc to mat.  After demonstration, board place from left side.  Pt able to assist with transfer requiring mod tactile assist.  After verbal cuing, pt able to cross bilat ankles with min assist then transferred sit to supine mod assist to right side.  In supine performed right scapular mobilization followed by anterior capsular stretches.  Performed slow progressive stretch to elbow,  wrist, and digit flexors to neutral position.  Transfer training supine to sit max assist from right.  Pt attempted to utilize LUE with verbal cuing.   Transfer training performed sit to stand max assist to block LLE.  Pt transferred onto wobble board and worked on core strength, dynamic sitting balance, and weight shifting.  Performed LUE hand taps 20 reps across midline and ipsilateral multiple planes.  Bilateral hands clasped hand taps multiple planes. Transfer training performed sit to stand from elevated seat using rail with LUE.  Pt performed 10 reps, with min/mod assist and verbal cuing to sequence mechanics.  Noted  pt flexing trunk forward with less tactile cuing.  Transfer training performed mat <> wc with sliding board mod assist to right. (See PT note for detailed report)        Time In: 1230  Time Out: 1315  Total Appointment Time (timed & untimed codes): 45 minutes   Treatment time: Functional activities 45 minutes  Neuro re-education   minutes      Assessment     Summary/Analysis of session: Pt seen in clinic setting after ST session. Performed cotx session with PT secondary to number of skilled hands required to achieve maximal benefit.  Began education on sliding board transfers and pt and to perform with min/mod assist depending in direction.  Also noted definite improvement with sit to stands utilizing LUE at rail.   Severe right hemiplegia with apraxia continues to hamper transfers and mobility.  Recommend continuing PLC to address functional deficits.    Progress toward previous goals: Continue STG/LTG   Goals:      LTG: (8 Weeks)   Pt will increase toilet transfers to min assist. (Progressing)  Pt with be able to don UE garments with min assist and LE garments with mod assist. (Progressing)  Pt will increase RUE function to be able to transfer supine to sit CGA from right side. (Progressing)     STG: (4 Weeks)   Pt will increase toilet transfers to mod assist stand pivot. (Progressing)  Pt will increase UE dressing to CGA and LE dressing to mod assist. (Progressing)  Pt will increase RUE function to be able to transfer supine to sit with min assist from right side. (Progressing)    Plan   Next session to focus on core strength/mobility, dynamic sitting/standing bal training, and education on HEP.    Follow Up  Follow up in: 2 days

## 2024-08-22 ENCOUNTER — CLINICAL SUPPORT (OUTPATIENT)
Dept: REHABILITATION | Facility: HOSPITAL | Age: 44
End: 2024-08-22
Payer: OTHER GOVERNMENT

## 2024-08-22 DIAGNOSIS — I67.89 CEREBROVASCULAR DISEASE, ACUTE: Primary | ICD-10-CM

## 2024-08-22 PROCEDURE — 97110 THERAPEUTIC EXERCISES: CPT

## 2024-08-22 PROCEDURE — 97530 THERAPEUTIC ACTIVITIES: CPT

## 2024-08-22 NOTE — PROGRESS NOTES
[facility]                              Daily Treatment Note          Name: Yong Stallings      Therapy Diagnosis:   Encounter Diagnosis   Name Primary?    Cerebrovascular disease, acute Yes     Physician: Mayito Langston MD    Visit Date: 8/22/2024        Time In: 1220  Time Out: 1315  Total Billable Time: 55 minutes    Precautions: Fall        Subjective     Pt reports: no new complaints.  Pt appeared eager to begin therapy session.          Pain: 0/10        Objective     Yong received therapeutic exercises to develop strength, endurance, ROM, flexibility, posture, and core stabilization for 10 minutes including:      Exercise Sets Reps Comments   BLE supine stretching/ROM 1 10 PT assisted pt with manual LE stretching and gentle PROM.  PT assisted with B knee flexion/extension, B hip flexion, B hip IR/ER, B adductor and HS stretching in preparation for mobility. Continued joint instability noted in the L knee with terminal extension.     Pt was also assisted with 5 reps of LTR and a stretch provided at end range.                      Yong participated in dynamic functional therapeutic activities to improve functional performance for 45  minutes, including:       Assist Level Assistive Device Comments    Bed Mobility ModA  Pt mobilized from sitting at the edge of the mat into supine.  Pt crossed his feet prior to descending onto the mat. Pt laid to his L side and was able to control the descent of his trunk using his LUE.  PT provided assistance with lower extremities . Upon returning to sitting, pt was able to mobilize his LLE off the edge of the mat but required assistance with the RLE.  OT provided pt with a hand and pt was able to perform elbow flexion and abdominal crunching to reach sitting.      Sit to stand/Stand to sit Hailey  Pt performed 2 trials of standing with OT assisting anteriorly. Pt wrapped his LUE around OT's shoulders.  OT blocked pt's  R knee to prevent buckling.  PT made adjustments to pt's sitting surface while pt stood briefly with OT.    Ten trials of sit to stand/stand to sit transitions were performed from the edge of the mat.  Pt sat on two blue foam pads to raise his sitting surface.  Pt utilized the railing positioned to his L to assist himself into standing.  PT was positioned anteriorly, blocking the R knee and assisting as needed. Pt required only minimal assistance to clear his buttocks from the mat.  Improved effort noted with reaching upright standing.  Pt initially demonstrated L sided pelvic and trunk rotation.  Cues were provided and posture improved as reps continued.  Focus placed on increased hip hinging prior to descent into sitting.  This improved as activity progressed.    Pt requested to perform one final trial of standing and was able to reach upright with nearly CGA only.     Balance Training SBA-modA  Pt performed dynamic sitting balance at the edge of the mat.  Pt was assisted to sit on a blue dynadisc placed on a blue foam pad.  While seated on this unstable surface, pt utilized visual feedback in the mirror for posture.  LUE multidirectional reaching was performed for colored bean bags scattered on the mat to pt's L side.  OT provided instructions for pt to retrieve a certain color bag and transition it into a bucket placed to pt's R side.  PT supervised pt at the edge of the mat, providing assisted with balance and cueing for posture as needed.  Pt also clasped his hands together and performed several reps of forward reaching for scapular protraction and retraction/AAROM of the RUE.     Wheelchair Mobility Hailey-modA  Pt was assisted with propelling his manual WC approximately 30 ft.  Pt utilized his LUE and LLE.  Pt with difficulty motor planning and coordinating simultaneous movement of these two limbs.  PT provided assistance with steering and provided cueing for increased use of the left leg.     Stair Climbing       Car Transfer      Other:  Lola-modA  Lateral sliding board t/f performed from the WC to the low mat and from the mat back to the WC.  When transitioning to the mat, pt scooted toward his L side.  Pt was assisted with initiation of the scoot, but was able to complete the remainder of the transition with only Lola.  Upon returning to the WC, pt had to transition to his R side. Pt transitioned from two blude foam mats to the WC.  This more level surface provided for an easier transition as compared to going uphill back to the chair.  Assistance was provided with placing the sliding board, but pt was able to remove the board on his own.  With increased practice with this skill, this style of transfer could greatly reduce caregiver burden within the home environment, while also allowing for increased independence for the pt.           Assessment     Pt tolerated session well and was motivated to participate in all activities.  Co-treatment performed with OT due to the need for two sets of skilled hands for safety.  Pt's performance in today's treatment was his best to date. Pt was able to perform standing activities with much less assistance.  Core stability also appears to be improving. Pt will continue to benefit from PT services in an effort to improve his transfer ability and maximize his independence with basic aspects of mobility.  This, in turn, should reduce caregiver burden within the home environment.    Yong Is progressing well towards his goals.   Pt prognosis is Fair.     Pt will continue to benefit from skilled outpatient physical therapy to address the deficits listed in the problem list box on initial evaluation, provide pt/family education and to maximize pt's level of independence in the home and community environment.     Pt's spiritual, cultural and educational needs considered and pt agreeable to plan of care and goals.    Anticipated barriers to physical therapy: decreased insight into deficits,  length of time since CVA    Goals:     See initial evaluation        Plan     Will plan to continue to see pt for PT services in coordination with OT in an effort to maximize pt's independence with basic mobility and reduce caregiver burden.      Cliff Hollis, PT, DPT

## 2024-08-23 ENCOUNTER — CLINICAL SUPPORT (OUTPATIENT)
Dept: REHABILITATION | Facility: HOSPITAL | Age: 44
End: 2024-08-23
Payer: OTHER GOVERNMENT

## 2024-08-23 DIAGNOSIS — I69.320 APHASIA AS LATE EFFECT OF CEREBROVASCULAR ACCIDENT (CVA): ICD-10-CM

## 2024-08-23 DIAGNOSIS — I69.322 DYSARTHRIA AS LATE EFFECT OF CEREBELLAR CEREBROVASCULAR ACCIDENT (CVA): ICD-10-CM

## 2024-08-23 DIAGNOSIS — I67.89 CEREBROVASCULAR DISEASE, ACUTE: Primary | ICD-10-CM

## 2024-08-23 PROCEDURE — 92507 TX SP LANG VOICE COMM INDIV: CPT

## 2024-08-23 NOTE — PROGRESS NOTES
Occupational Therapy  Occupational Therapy Outpatient Progress Note      Date: 2024  Name: Yong Stallings  Clinic Number: [id number]  : 23  Visit Number: 7      Subjective     Pt in good spirits and motivated for session. Pt continues to demonstrate expressive aphasia/dysarthria, but verbalizing with cuing.    Patient's response to therapy: Yong tolerated session fair with good effort.  Continue to note increase in over all endurance and not as fatigued at end of session.    Objective     Current condition: Yong demonstrates severe right hemiplegia effecting functional transfers, ADLs, visual perception, and overall quality of life secondary to a CVA.      TREATMENT  Principle of treatment/treatment today: Transfer training, core strengthening, neuromotor re-ed, and dynamic sitting/standing balance training.     Therapeutic Activity:  Performed cotx session with PT secondary to number of skilled hands required to achieve maximal benefit.  Began with transfer training wc to mat.  Reviewed sliding board transfer.  Pt attempted to place board but noted motor planning deficits requiring assist.  Noted increase in transfer today to min assist after set-up to left side.    After verbal cuing, pt able to cross bilat ankles with min assist then transferred sit to supine min assist to right side.  In supine performed right scapular mobilization followed by anterior capsular stretches.  Performed slow progressive stretch to elbow,  wrist, and digit flexors to neutral position.  Transfer training supine to sit mod with upper trunk with pt pull up with LUE.  Transfer training performed sit to stand max assist to block LLE.  Pt transferred onto unstable surface and worked on core strength, dynamic sitting balance, and weight shifting.  Performed LUE reaching and placing colored bean bags across midline, incorporating scanning activity finding appropriate colored bean bag.  Bilateral hands clasped worked on  anterior/posterior weight shifts 10 reps. Transfer training performed sit to stand from elevated seat using rail with LUE.  Pt performed 10 reps, with min/mod assist and verbal cuing to sequence mechanics.  Noted pt flexing trunk forward with less tactile cuing.  Transfer training performed mat <> wc with sliding board mod assist to right. (See PT note for detailed report)        Time In: 1400  Time Out: 1450  Total Appointment Time (timed & untimed codes): 50 minutes   Treatment time: Functional activities 50 minutes  Neuro re-education   minutes      Assessment     Summary/Analysis of session: Pt seen in clinic setting after ST session. Performed cotx session with PT secondary to number of skilled hands required to achieve maximal benefit.  Noted improvement with sliding board transfer today, min assist to left.  Continue to note gradual improvement with sit to stands utilizing LUE at rail.   Severe right hemiplegia with apraxia continues to hamper transfers and mobility.  Recommend continuing PLC to address functional deficits.    Progress toward previous goals: Continue STG/LTG   Goals:      LTG: (8 Weeks)   Pt will increase toilet transfers to min assist. (Progressing)  Pt with be able to don UE garments with min assist and LE garments with mod assist. (Progressing)  Pt will increase RUE function to be able to transfer supine to sit CGA from right side. (Progressing)     STG: (4 Weeks)   Pt will increase toilet transfers to mod assist stand pivot. (Progressing)  Pt will increase UE dressing to CGA and LE dressing to mod assist. (Progressing)  Pt will increase RUE function to be able to transfer supine to sit with min assist from right side. (Progressing)    Plan   Next session to focus on core strength/mobility, dynamic sitting/standing bal training, and education on HEP.    Follow Up  Follow up in: 5 days

## 2024-08-23 NOTE — PROGRESS NOTES
Speech and Language Therapy   Outpatient Progress Note  Date:  8/23/2024     Name: Yong Stallings   MRN: [id number]   Therapy Diagnosis:   Encounter Diagnoses   Name Primary?    Cerebrovascular disease, acute Yes    Aphasia as late effect of cerebrovascular accident (CVA)     Dysarthria as late effect of cerebellar cerebrovascular accident (CVA)        Physician: Mayito Langston MD  Physician Orders: speech eval and treat  Medical Diagnosis: CVA    Visit #/Visits authorized: 14/15  Date of Evaluation:  07/23/24  Insurance Authorization Period: 180 days  Plan of Care:      7/22/2024 to 10/22/2024   Extended POC:  n/a      UNTIMED  Procedure Min.   {Speech- Language- Voice Therapy  55min / 1 unit           Total Untimed Units: 1  Charges Billed/# of units: 1  Time In:  13:00  Time Out:  13:55  Total Billable Time: 55 min / 1 unit     Precautions: Standard and Fall    Subjective:     Pt reports: Pt was in good spirits and participated well. Frustration with difficulty  and unrealistic expectations persists and are somewhat perseveratory at times.       Objective:       Short term goals:     Pt will participate in standardized assessment with BDAE.   met 07/24/24   Pt will complete automatic language tasks at mod level with >90% intelligibility and min cues. Pt completed the following automatic language tasks:  1-10: 4/5x independently  10-1: 3/5x independently  ABCs: 21/26 independently  Pt completed all above tasks with 100% acc with PC   Pt will verbalize name and personally relevant information via naming or wh-questions for basic communication with min phonemic cues.  Pt responded to questions with the following targeted information:  Name: 4/5+x independently  Errorless learning with names completed x25+ with written and visual articulatory stim, min cues required.   Pt will label personally relevant items with 75% acc min cues.     Pt will comprehend personally relevant information at short story length with 75% acc  no cues.  Pt demonstrated comprehension of complex read information via yes/no questions with 85% acc.     Pt will tolerate NMES to right buccal/face for improved ROM and sensation for control of secretions and increased ROM. Pt tolerated NMES to left with 2.5mA and 6.0mA on right for 45min. Improved sensory and motor response at reduced mA con't. CV drills completed with mirror for increased ROM/awareness        Long Term Goal: Pt will communicate basic wants and needs via any modality with trained caregivers.      Articulatory kinematic drills CV x5 with >90% acc same units and max cues with 4:1 unit difference.      Patient Education/Response:   Progress and prognosis reviewed again. Pt con't to present with unrealistic expectations of time need for progress - Pt expected to be completely fluent after a few sessions with therapy.     Written Home Exercises Provided: con't automatic language tasks   Exercises were reviewed and Yong was able to demonstrate them prior to the end of the session.  Yong demonstrated good  understanding of the education provided.         Assessment:   Yong is progressing well towards his goals.  Current goals remain appropriate. Goals to be updated as necessary. Improved conversational ability with increased spontaneous phrases and words in isolation with cues and writing (with assistance).    Pt prognosis is Fair. Pt will continue to benefit from skilled outpatient speech and language therapy to address the deficits listed in the problem list on initial evaluation, provide pt/family education and to maximize pt's level of independence in the home and community environment.     KT NOMS (Functional Communication Measures):  KT NOMS: EVAL (... ) CURRENT   Attention: 4 4   Memory: 4 4   Motor Speech: 3 3   Readin 4   Spoken Language Comprehension: 4 4   Spoken Language Expression: 2 2   Swallowin 7      Barriers to Therapy: prolonged period since CVA  Pt's spiritual, cultural  and educational needs considered and pt agreeable to plan of care and goals.    Plan:   Continue POC with focus on functional communication of basic wants/needs.       Kate Olvera MA, CCC-SLP  08/23/2024

## 2024-08-26 ENCOUNTER — CLINICAL SUPPORT (OUTPATIENT)
Dept: REHABILITATION | Facility: HOSPITAL | Age: 44
End: 2024-08-26
Payer: OTHER GOVERNMENT

## 2024-08-26 DIAGNOSIS — I69.322 DYSARTHRIA AS LATE EFFECT OF CEREBELLAR CEREBROVASCULAR ACCIDENT (CVA): ICD-10-CM

## 2024-08-26 DIAGNOSIS — I69.320 APHASIA AS LATE EFFECT OF CEREBROVASCULAR ACCIDENT (CVA): ICD-10-CM

## 2024-08-26 DIAGNOSIS — I67.89 CEREBROVASCULAR DISEASE, ACUTE: Primary | ICD-10-CM

## 2024-08-26 PROCEDURE — 92507 TX SP LANG VOICE COMM INDIV: CPT

## 2024-08-26 NOTE — PROGRESS NOTES
Speech and Language Therapy   Outpatient   RE-ASSESSMENT   Date:  8/26/2024     Name: Yong Stallings   MRN: [id number]   Therapy Diagnosis:   Encounter Diagnoses   Name Primary?    Cerebrovascular disease, acute Yes    Aphasia as late effect of cerebrovascular accident (CVA)     Dysarthria as late effect of cerebellar cerebrovascular accident (CVA)        Physician: Mayito Langston MD  Physician Orders: speech eval and treat  Medical Diagnosis: CVA    Visit #/Visits authorized: 15/15  Date of Evaluation:  07/23/24  Insurance Authorization Period: 180 days  Plan of Care:      7/22/2024 to 10/22/2024   Extended POC:  n/a      UNTIMED  Procedure Min.   {Speech- Language- Voice Therapy  55min / 1 unit           Total Untimed Units: 1  Charges Billed/# of units: 1  Time In:  12:35  Time Out:  13:30  Total Billable Time: 55 min / 1 unit     Precautions: Standard and Fall    Subjective:     Pt reports: Pt agreed that progress has been made, although communicates frustration with time needed for progress.       Objective:   MOTOR SPEECH:  PHYSIOLOGY OF GENERAL MOVEMENT:  Global Posture: right lean 2/2 hemiparesis     MOTOR CONTROL:  Torso: weak  Neck: WFL  Arms/Hands: right michi  Legs/Feet: right michi; weakness in left suspected        PHYSIOLOGY OF SPEECH MECHANISM:  MOTOR CONTROL:  Respiration: WFL  Phonation: reduced breath support   Lips: right flaccidity and reduced ROM in speech, full ROM with cues  Tongue: deviation to right  Jaw: WFL  Soft Palate: WFL     SENSATION:  Increased sensation reported and noted with NMES on all aspects of right face      GENERAL ATTENTION:  Orientation: Pt able to verbalize name without cues; hospital, situation, and birthday with phonemic cues and/or writing; Pt not oriented to time despite multiple choice options  Attention: reduced sustained and selective attention noted in structured tasks  Visual Attention: right neglect persists     LANGUAGE:   Receptive Language:  1 Step Directions:  100%   2 Step Directions: 100%  Complex Directions: 65%  Simple y/n Questions: 100%  Complex y/n Questions: 75%      Expressive Language:   Automatic Speech: 1-10 100%, days of week 6/ initial phonemic cue, 100% with phonemic cues/models  Repetition: simple words 100%; 55% single complex words; 25% phrase level; 100% with phonemic cues  Phrase completion: closed ended 100%, open ended 50%  Naming items: 100% common items  Item Function: 0% without cues, 100% with cues  WH questions: 43%, increase to 100% with phonemic cues  Divergent Namin/10, increase to 7/10 with Pt pointing and SLP providing phonemic cue  Conversational discourse:  Increased islands of error free speech in conversation; Pt con't to supplement with gesture and required cues to use verbal language rather than gesture at times     COGNITIVE STATUS:  Behavioral Observations: emotional lability with laughing noted; unrealistic expectations re: rate of  progress and overall expectation of complete fluency   Insight and Awareness: Pt aware of expressive and receptive language errors/difficulty  Pragmatics: improved understanding of nonverbal cues  Long Term Memory: 100% with phonemic cues or multiple choice options   STM/Recall: 4/5 words presented written and verbal form, written multiple choice for recall  Unable to further assess cognition due to limited volitional verbal output    Short term goals:     Pt will participate in standardized assessment with BDAE.   met 24   Pt will complete automatic language tasks at mod level with >90% intelligibility and min cues.    Pt will verbalize name and personally relevant information via naming or wh-questions for basic communication with min phonemic cues.     Pt will label personally relevant items with 75% acc min cues.     Pt will comprehend personally relevant information at short story length with 75% acc no cues.      Pt will tolerate NMES to right buccal/face for improved ROM and sensation  for control of secretions and increased ROM.    Pt will complete articulatory kinematic drills with CV/VC x4 units (with max of 1 unit different) x5 reps with 90% acc no cues.     Long Term Goal: Pt will communicate basic wants and needs via any modality with trained caregivers.      Articulatory kinematic drills CV x5 with >90% acc same units and max cues with 4:1 unit difference.      Patient Education/Response:   Progress and prognosis reviewed in depth with results of todays re-assessment.     Written Home Exercises Provided: con't automatic language tasks, practice names  Exercises were reviewed and Yong was able to demonstrate them prior to the end of the session.  Yong demonstrated good  understanding of the education provided.         Assessment:   Yong is progressing well towards his goals.  NOMS updated. Goals updated. Improvement noted.     Pt prognosis is Fair. Pt will continue to benefit from skilled outpatient speech and language therapy to address the deficits listed in the problem list on initial evaluation, provide pt/family education and to maximize pt's level of independence in the home and community environment.     KT NOMS (Functional Communication Measures):  KT NOMS: KANDI 24 CURRENT 24   Attention: 4 4   Memory: 4 5   Motor Speech: 3 4   Readin 4   Spoken Language Comprehension: 4 4   Spoken Language Expression: 2 3   Swallowin 7      Barriers to Therapy: prolonged period since CVA  Pt's spiritual, cultural and educational needs considered and pt agreeable to plan of care and goals.    Plan:   Continue POC with focus on functional communication of basic wants/needs.       Kate Olvera MA, CCC-SLP  2024

## 2024-08-27 ENCOUNTER — CLINICAL SUPPORT (OUTPATIENT)
Dept: REHABILITATION | Facility: HOSPITAL | Age: 44
End: 2024-08-27
Payer: OTHER GOVERNMENT

## 2024-08-27 DIAGNOSIS — I67.89 CEREBROVASCULAR DISEASE, ACUTE: Primary | ICD-10-CM

## 2024-08-27 PROCEDURE — 97110 THERAPEUTIC EXERCISES: CPT

## 2024-08-27 PROCEDURE — 97530 THERAPEUTIC ACTIVITIES: CPT

## 2024-08-27 PROCEDURE — 97535 SELF CARE MNGMENT TRAINING: CPT

## 2024-08-27 NOTE — PROGRESS NOTES
Occupational Therapy  Occupational Therapy Outpatient Progress Note      Date: 2024  Name: Yong Stallings  Clinic Number: [id number]  : 23  Visit Number: 8      Subjective     Pt in good spirits and motivated for session. Pt continues to demonstrate expressive aphasia/dysarthria, but verbalizing with cuing.  Worked on counting repetitions, required verbal and visual cues.  Yong without pain reports.    Patient's response to therapy: Yong tolerated session fair with good effort.  Continue to note increase in over all endurance with little reports of fatigued at end of session.    Objective     Current condition: Yong demonstrates severe right hemiplegia effecting functional transfers, ADLs, visual perception, and overall quality of life secondary to a CVA.      TREATMENT  Principle of treatment/treatment today: Transfer training, core strengthening, neuromotor re-ed,  ADL dressing training, and dynamic sitting/standing balance training.     Therapeutic Activity:  Performed cotx session with PT secondary to number of skilled hands required to achieve maximal benefit.  Began with transfer training wc to mat with sliding board.  Pt able to position wc, verbal cuing required to lock brakes, scoot forward and remove armrest.  Pt able to place board with mod assist but.  Noted increase in transfer today to min assist to adjust feet after set-up to left side.    After verbal cuing, pt able to cross bilat ankles with SBA assist then transferred sit to supine mod assist to raise BLE to right side.  In supine performed right scapular mobilization followed by anterior capsular stretches.  Performed slow progressive stretch to elbow,  wrist, and digit flexors to neutral position.  Transfer training supine to sit mod with upper trunk with pt pull up with LUE.  Transfer training performed sit to stand max assist to block LLE.  Pt transferred onto unstable surface and worked on core strength, dynamic sitting balance, and  weight shifting while performing ADL UE dressing training.  Pt able to doff long sleeve pull over t-shirt with mod assist to pullover head then min assist with RUE.  After setup, pt able to marvin with min assist with RUE and pull over head.  Worked on bal/core strength with bilateral hands clasped.  Noted multiple LOB requiring therapist recovery with trunk rotations to right and posterior weight shift. Transfer training performed sit to stand from elevated seat using rail with LUE.  Pt performed 10 reps, with min/mod assist and verbal cuing to sequence mechanics.  Noted pt mechanics improved after first 2 attempts.  Transfer training performed mat <> wc with sliding board min/mod assist to right after setup. (See PT note for detailed report)        Time In: 1220  Time Out: 1320  Total Appointment Time (timed & untimed codes): 60 minutes   Treatment time: Functional activities 15 minutes  Neuro re-education   minutes  Activities of Daily living 15 minutes    Assessment     Summary/Analysis of session: Pt seen in clinic setting.  pt on time and well groomed.  Performed cotx session with PT secondary to number of skilled hands required to achieve maximal benefit.  Noted improvement with sliding board transfer today, almost SBA to left.  Began ADL dressing training requiring mod assist to doff/don pull over long sleeve t-shirt.  Continue to note gradual improvement with sit to stands utilizing LUE at rail.   Severe right hemiplegia with apraxia continues to hamper transfers and mobility.  Recommend continuing PLC to address functional deficits.    Progress toward previous goals: Continue STG/LTG   Goals:      LTG: (8 Weeks)   Pt will increase toilet transfers to min assist. (Progressing)  Pt with be able to don UE garments with min assist and LE garments with mod assist. (Progressing)  Pt will increase RUE function to be able to transfer supine to sit CGA from right side. (Progressing)     STG: (4 Weeks)   Pt will  increase toilet transfers to mod assist stand pivot. (Progressing)  Pt will increase UE dressing to CGA and LE dressing to mod assist. (Progressing)  Pt will increase RUE function to be able to transfer supine to sit with min assist from right side. (Progressing)    Plan   Next session to focus on core strength/mobility, dynamic sitting/standing bal training, ADL training, and education on HEP.    Follow Up  Follow up in: 2 days

## 2024-08-27 NOTE — PROGRESS NOTES
[facility]                              Daily Treatment Note          Name: Yong Stallings      Therapy Diagnosis:   Encounter Diagnosis   Name Primary?    Cerebrovascular disease, acute Yes     Physician: Mayito Langston MD    Visit Date: 8/27/2024        Time In: 1220  Time Out: 1320  Total Billable Time: 60 minutes    Precautions: Fall        Subjective     Pt reports: no new complaints.       Pain: 0/10        Objective     Yong received therapeutic exercises to develop strength, endurance, ROM, flexibility, posture, and core stabilization for 10 minutes including:      Exercise Sets Reps Comments   BLE supine stretching/ROM 1 10 PT assisted pt with manual LE stretching and gentle PROM.  PT assisted with B knee flexion/extension, B hip flexion, B hip IR/ER, B adductor and HS stretching in preparation for mobility. Continued joint instability noted in the L knee with terminal extension.     Pt was also assisted with 5 reps of LTR and a stretch provided at end range.                      Yong participated in dynamic functional therapeutic activities to improve functional performance for 50  minutes, including:       Assist Level Assistive Device Comments    Bed Mobility ModA  Pt mobilized from sitting at the edge of the mat into supine.  Pt crossed his feet prior to descending onto the mat. Pt laid to his L side and was able to control the descent of his trunk using his LUE.  OT provided assistance with lower extremities . Upon returning to sitting, pt was able to mobilize his LLE off the edge of the mat but required assistance with the RLE.  PT provided pt with a hand and pt was able to perform elbow flexion and abdominal crunching to reach sitting.      Sit to stand/Stand to sit Hailey  Pt performed 1 trial of standing with PT assisting anteriorly. Pt wrapped his LUE around PT's shoulders.  PT blocked pt's R knee to prevent buckling.  OT made adjustments  to pt's sitting surface while pt stood briefly with PT.    Ten trials of sit to stand/stand to sit transitions were performed from the edge of the mat.  Pt sat on ONE blue foam pad to raise his sitting surface.  Pt utilized the railing positioned to his L to assist himself into standing.  PT was positioned anteriorly, blocking the R knee and assisting as needed. Pt required only minimal assistance to clear his buttocks from the mat.  Improved effort noted with reaching upright standing. Focus placed on increased hip hinging prior to descent into sitting.  This improved as activity progressed.  Pt initially required Lola for buttocks clearance, but this progressed to CGA with increased reps.   Pt requested to perform one final trial of standing and was able to reach upright with only the R knee being stabilized. Pt performed the remainder of the transition with supervision only.     Balance Training SBA-modA  Pt performed dynamic sitting balance at the edge of the mat.  Pt was assisted to sit on a wobble board placed on a 2 inch step.  While seated on this unstable surface, pt utilized visual feedback in the mirror for posture.  Pt performed upper body dressing with OT while maintaining his balance.  PT provided supervision posteriorly.  Pt also clasped his hands together and performed several reps of multidirectional reaching for AAROM of the RUE.  When pt reached across the midline to his R, he demonstrated increased difficulty returning to midline.  While seated in this position, OT provided perturbations at pt's hips and pt was instructed to maintain upright sitting.  Pt then performed 5 reps of R and L lateral weight shifting one the wobble board via lateral pelvic tilting/hip hiking.    Wheelchair Mobility Lola-modA  Pt was assisted with propelling his manual WC approximately 30 ft.  Pt utilized his LUE and LLE.  Pt with difficulty motor planning and coordinating simultaneous movement of these two limbs.  PT  provided assistance with steering and provided cueing for increased use of the left leg.  For the last 10 feet, PT instructed pt to only utilized the LLE.  PT provided tactile cueing for a hip hike, knee extension, and HS flexion.     Stair Climbing      Car Transfer      Other:  Lola-modA  Lateral sliding board t/f performed from the WC to the low mat and from the mat back to the WC.  When transitioning to the mat, pt scooted toward his L side.  Pt was assisted with initiation of the scoot, but was able to complete the remainder of the transition with only Lola.  Upon returning to the WC, pt had to transition to his R side. Pt transitioned from one blue foam mat to the WC.  Pt required assistance to get onto the board, but was able to perform the remainder of the transition with assistance only for adjustment of his feet.  Assistance was provided with placing the sliding board, but pt was able to remove the board on his own.  With increased practice with this skill, this style of transfer could greatly reduce caregiver burden within the home environment, while also allowing for increased independence for the pt.           Assessment     Pt tolerated session well and was motivated to participate in all activities.  Co-treatment performed with OT due to the need for two sets of skilled hands for safety.  Pt's performance continues to improve from session to session. Pt as able to perform sit to stand/stand to sit transitions with the least amount of assistance to date.  His independence with sliding board transfers also continues to improve.  Pt will continue to benefit from PT services in an effort to improve his transfer ability and maximize his independence with basic aspects of mobility.  This, in turn, should reduce caregiver burden within the home environment.    Yong Is progressing well towards his goals.   Pt prognosis is Fair.     Pt will continue to benefit from skilled outpatient physical therapy to  address the deficits listed in the problem list box on initial evaluation, provide pt/family education and to maximize pt's level of independence in the home and community environment.     Pt's spiritual, cultural and educational needs considered and pt agreeable to plan of care and goals.    Anticipated barriers to physical therapy: decreased insight into deficits, length of time since CVA    Goals:     See initial evaluation        Plan     Will plan to continue to see pt for PT services in coordination with OT in an effort to maximize pt's independence with basic mobility and reduce caregiver burden.      Cliff Hollis, PT, DPT

## 2024-08-29 ENCOUNTER — CLINICAL SUPPORT (OUTPATIENT)
Dept: REHABILITATION | Facility: HOSPITAL | Age: 44
End: 2024-08-29
Payer: OTHER GOVERNMENT

## 2024-08-29 DIAGNOSIS — I67.89 CEREBROVASCULAR DISEASE, ACUTE: Primary | ICD-10-CM

## 2024-08-29 PROCEDURE — 97112 NEUROMUSCULAR REEDUCATION: CPT

## 2024-08-29 PROCEDURE — 97535 SELF CARE MNGMENT TRAINING: CPT

## 2024-08-29 PROCEDURE — 97530 THERAPEUTIC ACTIVITIES: CPT

## 2024-08-30 NOTE — PROGRESS NOTES
Occupational Therapy  Occupational Therapy Outpatient Progress Note      Date: 2024  Name: Yong Stallings  Clinic Number: [id number]  : 23  Visit Number: 9      Subjective     Pt in good spirits and motivated for session. Pt continues to demonstrate expressive aphasia/dysarthria, but verbalizing with cuing.  Worked on naming colors during session requiring multiple verbal/visual cues.  Yong without pain reports.    Patient's response to therapy: Yong tolerated session fair with good effort.  Continue to note increase in over all endurance with little reports of fatigued at end of session.    Objective     Current condition: Yong demonstrates severe right hemiplegia effecting functional transfers, ADLs, visual perception, and overall quality of life secondary to a CVA.      TREATMENT  Principle of treatment/treatment today: Transfer training, core strengthening, neuromotor re-ed,  ADL dressing training, and dynamic sitting/standing balance training.     Therapeutic Activity:  Began with transfer training wc to mat with sliding board.  Pt able to position wc and prepare for transfer with one verbal cue to remove armrest.  Pt able to place board with mod assist.  Noted pt required mod assist to begin transfer then min assist to complete by adjusting feet.    Transfer training sit to supine to right side.  Pt able to cross bilat ankles with SBA assist then transferred sit to supine mod assist to raise BLE.  In supine performed progressive stretches to trunk, hamstrings, and ankles.  Performed right scapular mobilization followed by anterior capsular stretches.  Performed slow progressive stretch to elbow,  wrist, and digit flexors to neutral position.  Transfer training supine to sit mod with upper trunk with pt pushing up with LUE.  Transfer training performed sit to stand max assist while blocking RLE.  Pt transferred onto unstable surface and worked on core strength, dynamic sitting balance, and weight  shifting while performing ADL UE dressing training.  Pt able to doff long sleeve pull over t-shirt with one tactile assist to pullover head.  After setup, pt able to marvin with min assist with RUE then pt able to complete with extended time.  Performed RUE weight bearing, visual scanning activity reaching and placing large pegs across midline.  Pt able to follow color pattern with 3 verbal cues. Transfer training performed sit to stand from elevated seat using rail with LUE.  Pt performed 6 reps, with min/mod assist and verbal cuing to sequence mechanics.  Noted pt mechanics improved after first attempt.  Transfer training performed mat <> wc with sliding board mod assist to right after setup.       Time In: 1200  Time Out: 1300  Total Appointment Time (timed & untimed codes): 60 minutes   Treatment time: Functional activities 30 minutes  Neuro re-education 15 minutes  Activities of Daily living 15 minutes    Assessment     Summary/Analysis of session: Pt seen in clinic setting.  pt on time and well groomed.  PT unavailable to assist with session.  Noted less verbal cues required to scan to right during session.  Noted pt able to doff and don pullover long sleeve shirt with min assist today.  Continue to note gradual improvement with sit to stands utilizing LUE at rail.   Severe right hemiplegia with apraxia continues to hamper transfers and mobility.  Recommend continuing PLC to address functional deficits.    Progress toward previous goals: Continue STG/LTG   Goals:      LTG: (8 Weeks)   Pt will increase toilet transfers to min assist. (Progressing)  Pt with be able to don UE garments with min assist and LE garments with mod assist. (Progressing)  Pt will increase RUE function to be able to transfer supine to sit CGA from right side. (Progressing)     STG: (4 Weeks)   Pt will increase toilet transfers to mod assist stand pivot. (Progressing)  Pt will increase UE dressing to CGA and LE dressing to mod assist.  (Progressing)  Pt will increase RUE function to be able to transfer supine to sit with min assist from right side. (Progressing)    Plan   Next session to focus on core strength/mobility, dynamic sitting/standing bal training, ADL training, and education on HEP.    Follow Up  Follow up in: 5 days

## 2024-09-04 ENCOUNTER — CLINICAL SUPPORT (OUTPATIENT)
Dept: REHABILITATION | Facility: HOSPITAL | Age: 44
End: 2024-09-04
Payer: OTHER GOVERNMENT

## 2024-09-04 DIAGNOSIS — I67.89 CEREBROVASCULAR DISEASE, ACUTE: Primary | ICD-10-CM

## 2024-09-04 PROCEDURE — 97112 NEUROMUSCULAR REEDUCATION: CPT

## 2024-09-04 PROCEDURE — 97535 SELF CARE MNGMENT TRAINING: CPT

## 2024-09-04 PROCEDURE — 97530 THERAPEUTIC ACTIVITIES: CPT

## 2024-09-05 ENCOUNTER — CLINICAL SUPPORT (OUTPATIENT)
Dept: REHABILITATION | Facility: HOSPITAL | Age: 44
End: 2024-09-05
Payer: OTHER GOVERNMENT

## 2024-09-05 DIAGNOSIS — I67.89 CEREBROVASCULAR DISEASE, ACUTE: Primary | ICD-10-CM

## 2024-09-05 PROCEDURE — 97110 THERAPEUTIC EXERCISES: CPT

## 2024-09-05 PROCEDURE — 97112 NEUROMUSCULAR REEDUCATION: CPT

## 2024-09-05 PROCEDURE — 97530 THERAPEUTIC ACTIVITIES: CPT

## 2024-09-05 NOTE — PROGRESS NOTES
Occupational Therapy  Occupational Therapy Outpatient Progress Note      Date: 2024  Name: Yong Stallings  Clinic Number: [id number]  : 23  Visit Number: 10      Subjective     Pt in good spirits and motivated for session. Pt continues to demonstrate expressive aphasia/dysarthria, but verbalizing with cuing.  Worked on naming colors and counting repetitions during session, noted more automatic with counting today.  Yong without pain reports.    Patient's response to therapy: Yong tolerated session fair with good effort.  Continue to note increase in over all endurance with little reports of fatigued at end of session.    Objective     Current condition: Yong demonstrates severe right hemiplegia effecting functional transfers, ADLs, visual perception, and overall quality of life secondary to a CVA.      TREATMENT  Principle of treatment/treatment today: Transfer training, core strengthening, neuromotor re-ed,  ADL dressing training, and dynamic sitting/standing balance training.     Therapeutic Activity:  Began with transfer training wc to mat with sliding board.  Pt able to position wc and prepare for transfer with one verbal cue to find footrest release.  Pt able to place board with mod assist.  Pt able to perform transfer with min assist to left.  Transfer training sit to supine to right side.  Pt able to cross bilat ankles with SBA assist then transferred sit to supine min assist to raise BLE.  In supine performed progressive stretches to trunk, hamstrings, and ankles.  Performed right scapular mobilization followed by anterior capsular stretches.  Performed slow progressive stretch to elbow,  wrist, and digit flexors to neutral position.  Transfer training supine to sit mod with upper trunk with pt pushing up with LUE.  Transfer training performed sit to stand max assist while blocking RLE.  Pt transferred onto unstable surface and worked on core strength, dynamic sitting balance, and weight shifting  while performing ADL UE dressing training.  Pt able to doff short sleeve pull over t-shirt with one verbal cue to pullover head.  After setup, pt able to marvin with min assist with RUE then pt able to complete with extended time.  Performed RUE weight bearing, visual scanning activity reaching and placing graded clothes pin across midline.  Pt able to follow color pattern with 2 verbal cues. Transfer training performed sit to stand from elevated seat using rail with LUE.  Pt performed 6 reps, with min/mod assist and verbal cuing to sequence mechanics and maintain forward trunk flexion.  While in standing worked on weight shifting l/r 5 reps ea stand with CGA while blocking right knee.  Transfer training performed mat <> wc with sliding board mod assist to begin then min assist to complete.  After left foot rest applied to wheelchair, pt able to reapplied left arm rest, place feet on foot rests with LUE assist, and unlock brakes.        Time In: 1315  Time Out: 1420  Total Appointment Time (timed & untimed codes): 65 minutes   Treatment time: Functional activities 35 minutes  Neuro re-education 15 minutes  Activities of Daily living 15 minutes    Assessment     Summary/Analysis of session: Pt seen in clinic setting.  pt on time and well groomed.  PT unavailable to assist with session.  Noted less assist required with wheelchair management.  Noted pt nearly CGA with sliding board transfer to left.  Noted pt able to doff and don pullover short sleeve shirt with with 1 tactile assist today.  Continue to note gradual improvement with sit to stands utilizing LUE at rail.   Severe right hemiplegia with apraxia continues to hamper transfers and mobility.  Recommend continuing PLC to address functional deficits.    Progress toward previous goals: Continue STG/LTG   Goals:      LTG: (8 Weeks)   Pt will increase toilet transfers to min assist. (Progressing)  Pt with be able to don UE garments with min assist and LE garments  with mod assist. (Progressing)  Pt will increase RUE function to be able to transfer supine to sit CGA from right side. (Progressing)     STG: (4 Weeks)   Pt will increase toilet transfers to mod assist stand pivot. (Progressing)  Pt will increase UE dressing to CGA and LE dressing to mod assist. (Progressing)  Pt will increase RUE function to be able to transfer supine to sit with min assist from right side. (Progressing)    Plan   Next session to focus on core strength/mobility, dynamic sitting/standing bal training, ADL training, and education on HEP.    Follow Up  Follow up in: 1 days

## 2024-09-06 NOTE — PROGRESS NOTES
[facility]                              Daily Treatment Note          Name: Yong Stallings      Therapy Diagnosis:   Encounter Diagnosis   Name Primary?    Cerebrovascular disease, acute Yes     Physician: Mayito Langston MD    Visit Date: 9/5/2024        Time In: 1215  Time Out: 1315  Total Billable Time: 60 minutes    Precautions: Fall        Subjective     Pt reports: no new complaints.       Pain: 0/10        Objective     Yong received therapeutic exercises to develop strength, endurance, ROM, flexibility, posture, and core stabilization for 10 minutes including:      Exercise Sets Reps Comments   BLE supine stretching/ROM 1 10 PT assisted pt with manual LE stretching and gentle PROM.  PT assisted with B knee flexion/extension, B hip flexion, B hip IR/ER, B adductor and HS stretching in preparation for mobility. Continued joint instability noted in the L knee with terminal extension.     Pt was also assisted with 5 reps of LTR and a stretch provided at end range.                      Yong participated in dynamic functional therapeutic activities to improve functional performance for 50  minutes, including:       Assist Level Assistive Device Comments    Bed Mobility ModA  Pt mobilized from sitting at the edge of the mat into supine.  Pt crossed his feet prior to descending onto the mat. Pt laid to his L side and was able to control the descent of his trunk using his LUE.  PT provided assistance with lower extremities . Upon returning to sitting, pt was able to mobilize his LLE off the edge of the mat but required assistance with the RLE.  Pt was encouraged to attempt a partial roll toward his left in order to be able better utilize L elbow extension to reach upright sitting. Difficulty noted with obtaining this position.  OT assisted posteriorly with rolling and pt was provided with less assistance to reach sitting, forcing increased utilization of the  LUE.     Sit to stand/Stand to sit Hailey-modA  Pt performed 1 trial of standing with PT assisting anteriorly. Pt wrapped his LUE around PT's shoulders.  PT blocked pt's R knee to prevent buckling.  OT made adjustments to pt's sitting surface while pt stood briefly with PT.    Five trials of sit to stand/stand to sit transitions were performed from the edge of the mat.  Pt sat on ONE blue foam pad to raise his sitting surface.  Pt utilized the railing positioned to his L to assist himself into standing.  OT was positioned anteriorly but provided pt with less assistance during both ascent and descent.  Without his R knee blocked, pt experienced increased difficulty reaching upright standing.  ModA had to be provided to prevent an episode of B knee buckling.  As activity progressed, pt's ability to reach upright standing improved.  With less assistance provided, pt rapidly descended during the final 50% of the sit. While standing, pt performed R and L lateral weight shifting x 5 reps during each trial of standing.    Pt also performed 3 trials of standing from WC level with the railing to his front.  Due to spatial constraints, increased difficulty was noted with controlling descent into sitting.  During these 3 trials of standing, OT worked on vision.  See OT note for reference to this activity.    Balance Training SBA-modA  Pt performed dynamic sitting balance at the edge of the mat.  Pt was assisted to sit on a wobble board placed on a 2 inch step.  While seated on this unstable surface, pt utilized visual feedback in the mirror for posture.  Pt performed upper body dressing with OT while maintaining his balance.  PT provided supervision/assistance posteriorly.  Pt then performed 10 reps of R and L lateral weight shifting on the wobble board via lateral pelvic tilting/hip hiking.   Pt was then transitioned onto a single blue foam pad and performed lateral leaning onto each elbow, utilizing his trunk to return to  midline sitting.  Pt performed this task x 10 reps.   Wheelchair Mobility Lola-modA  Pt able to position his WC at the edge of the mat in order to prepare for lateral sliding board transfers. PT and OT observed pt's sequencing including the following:  locking brakes, removing the L sided armrest, removing the L leg rest, and positioning the sliding board.  Some difficulty was noted with locating the leg rest lever.      Stair Climbing      Car Transfer      Other:  Lola-modA  Lateral sliding board t/f performed from the WC to the low mat and from the mat back to the .  When transitioning to the mat, pt scooted toward his L side.  Pt was able to initiate the scoot and complete the remainder of the transition with only Lola.  Upon returning to the WC, pt had to transition to his R side. Pt transitioned from one blue foam mat to the .  Pt required assistance to get onto the board, but was able to perform the remainder of the transition with assistance only for adjustment of his feet.  Assistance was provided with placing the sliding board, but pt was able to remove the board on his own.  With increased practice with this skill, this style of transfer could greatly reduce caregiver burden within the home environment, while also allowing for increased independence for the pt.           Assessment     Pt tolerated session well and was motivated to participate in all activities.  Co-treatment performed with OT due to the need for two sets of skilled hands for safety.  Pt continues to progress toward his functional goals and his trunk stability and independence with transitional movements continues to improve. PT feels that with continued practice, pt could potentially utilize lateral sliding board transfers within the home environment.  This should allow for increased independence and less reliance on caregivers.  Overall, PT continues to target basic mobility tasks and utilization of strategies that will promote  increased independence with daily living.      Yong Is progressing well towards his goals.   Pt prognosis is Fair.     Pt will continue to benefit from skilled outpatient physical therapy to address the deficits listed in the problem list box on initial evaluation, provide pt/family education and to maximize pt's level of independence in the home and community environment.     Pt's spiritual, cultural and educational needs considered and pt agreeable to plan of care and goals.    Anticipated barriers to physical therapy: decreased insight into deficits, length of time since CVA    Goals:     See initial evaluation        Plan     Will plan to continue to see pt for PT services in coordination with OT in an effort to maximize pt's independence with basic mobility and reduce caregiver burden.      Cliff Hollis, PT, DPT

## 2024-09-06 NOTE — PROGRESS NOTES
Occupational Therapy  Occupational Therapy Outpatient Progress Note      Date: 2024  Name: Yong Stallings  Clinic Number: [id number]  : 23  Visit Number: 11      Subjective     Pt in good spirits and motivated for session. Pt continues to demonstrate expressive aphasia/dysarthria, but verbalizing with cuing.  Worked on naming colors and counting repetitions during session, noted pt having more word finding today requiring more cuing.  Yong without pain reports.    Patient's response to therapy: Yong tolerated session fair with good effort.  Continue to note increase in over all endurance with little reports of fatigued at end of session.    Objective     Current condition: Yong demonstrates severe right hemiplegia effecting functional transfers, ADLs, visual perception, and overall quality of life secondary to a CVA.      TREATMENT  Principle of treatment/treatment today: Transfer training, core strengthening, neuromotor re-ed,  ADL dressing training, and dynamic sitting/standing balance training.     Therapeutic Activity:  Cotx performed with PT secondary to number of skilled hands required to perform higher level activities.  Began with transfer training wc to mat with sliding board.  Pt able to position wc and prepare for transfer with one verbal/tactile cue to find footrest release.  Pt able to place board with mod assist.  Pt able to perform transfer with min assist to begin and adjust feet then CGA remainder to left.  Transfer training sit to supine to right side.  Pt able to cross bilat ankles with SBA assist then transferred sit to supine min assist to raise BLE.  In supine PT performed progressive stretches to trunk and LEs.  OT performed right scapular mobilization followed by anterior capsular stretches.  Performed slow progressive stretch to elbow,  wrist, and digit flexors to neutral position.  Attempted RUE neuro re-ed AAROM shoulder flex/ext, but noted increase in flexor synergy over-riding any  active movement.  Transfer training supine to sit.  Pt able to slide LLE off mat then min assist with RLE, max assist required to place upper trunk in position for pt to push up with LUE.  Pt transferred onto rocker disc and worked on core strength, dynamic sitting balance, and weight shifting while performing ADL UE dressing training.  Pt able to doff short sleeve pull over polo with one verbal cue to pullover head.  After setup, pt able to marvin with min assist to place RUE then pt able to complete with extended time.  Performed core strengthening/weight shifting lateral weight shifts l/r 2x5 reps mod assist.  Bed mobility scooting 3 feet to left min assist to adjust feet.  Transfer training performed sit to stands from elevated seat using rail with LUE.  Pt performed 6 reps, with min/mod assist and verbal cuing to sequence mechanics and maintain forward trunk flexion.  While in standing worked on weight shifting l/r 5 reps ea stand with CGA while blocking right knee.  Transfer training performed mat <> wc with sliding board min assist.  Pt positioned in front of wall mirror, transferred sit to stand 2x's with mod assist pulling up on rail.  While in standing performed oculomotor scanning activity horizontal and vertical planes.     Time In: 1215  Time Out: 1315  Total Appointment Time (timed & untimed codes): 60 minutes   Treatment time: Functional activities   minutes  Neuro re-education 15 minutes  Activities of Daily living 15 minutes    Assessment     Summary/Analysis of session: Pt seen in clinic setting.  pt on time and well groomed.  Cotx performed with PT secondary to number of skilled hands required to perform higher level activities.  Noted pt continues to progress with sliding board transfer, nearly CGA.  Noted pt able to doff and don pullover short sleeve shirt with with 1 tactile assist today.  Continue to note gradual core strengthening, but pt continues to rely on LUE to maintain midline.  Severe  right hemiplegia with apraxia continues to hamper overall functional abilities.  Recommend continuing PLC to address functional deficits.    Progress toward previous goals: Continue STG/LTG   Goals:      LTG: (8 Weeks)   Pt will increase toilet transfers to min assist. (Progressing)  Pt with be able to don UE garments with min assist and LE garments with mod assist. (Progressing)  Pt will increase RUE function to be able to transfer supine to sit CGA from right side. (Progressing)     STG: (4 Weeks)   Pt will increase toilet transfers to mod assist stand pivot. (Progressing)  Pt will increase UE dressing to CGA and LE dressing to mod assist. (Progressing)  Pt will increase RUE function to be able to transfer supine to sit with min assist from right side. (Progressing)    Plan   Next session to focus on core strength/mobility, dynamic sitting/standing bal training, ADL training, and education on HEP.    Follow Up  Follow up in: 5 days

## 2024-09-10 ENCOUNTER — CLINICAL SUPPORT (OUTPATIENT)
Dept: REHABILITATION | Facility: HOSPITAL | Age: 44
End: 2024-09-10
Payer: OTHER GOVERNMENT

## 2024-09-10 DIAGNOSIS — I69.322 DYSARTHRIA AS LATE EFFECT OF CEREBELLAR CEREBROVASCULAR ACCIDENT (CVA): ICD-10-CM

## 2024-09-10 DIAGNOSIS — I67.89 CEREBROVASCULAR DISEASE, ACUTE: Primary | ICD-10-CM

## 2024-09-10 DIAGNOSIS — I69.320 APHASIA AS LATE EFFECT OF CEREBROVASCULAR ACCIDENT (CVA): ICD-10-CM

## 2024-09-10 PROCEDURE — 92507 TX SP LANG VOICE COMM INDIV: CPT

## 2024-09-10 PROCEDURE — 97530 THERAPEUTIC ACTIVITIES: CPT

## 2024-09-10 PROCEDURE — 97112 NEUROMUSCULAR REEDUCATION: CPT

## 2024-09-10 NOTE — PROGRESS NOTES
Speech and Language Therapy   Outpatient Progress Note    Date:  9/10/2024     Name: Yong Stallings   MRN: [id number]   Therapy Diagnosis:   Encounter Diagnoses   Name Primary?    Cerebrovascular disease, acute Yes    Aphasia as late effect of cerebrovascular accident (CVA)     Dysarthria as late effect of cerebellar cerebrovascular accident (CVA)        Physician: Mayito Langston MD  Physician Orders: speech eval and treat  Medical Diagnosis: CVA    Visit #/Visits authorized: 1/15  Date of Evaluation:  07/23/24  Insurance Authorization Period: 180 days  Plan of Care:      7/22/2024 to 10/22/2024     UNTIMED  Procedure Min.   {Speech- Language- Voice Therapy  45min / 1 unit           Total Untimed Units: 1  Charges Billed/# of units: 1  Time In:  11:30  Time Out:  12:15  Total Billable Time: 45 min / 1 unit     Precautions: Standard and Fall    Subjective:     Pt reports: Pt jokingly reported that he did not miss SLP while waiting on insurance authorization. Good participation despite overt frustration/disappointment by end of session.       Objective:     Short term goals:     Pt will complete automatic language tasks at mod level with >90% intelligibility and min cues. Pt completed the following automatic tasks with written stim:  1-10: 1/3x independently  10-1: 2/2 phonemic cues (PC)  Days: 2/2 PC  Months: 3/3 PC  ABCs: 20/26 indpendently    100% acc with phonemic cues with all above   Pt will verbalize name and personally relevant information via naming or wh-questions for basic communication with min phonemic cues.  Pt stated name x5 independently, prolonged initiation time required at times. Pt listed hurricane items with 80% acc semantic cues, increase to 100% with PC.    Pt will label personally relevant items with 75% acc min cues.  Pt labeled personally relevant pictured items with 79% acc carrier phrases only, increase to 100% with PC.   Pt will comprehend personally relevant information at short story  length with 75% acc no cues.     Pt will complete articulatory kinematic drills with CV/VC x4 units (with max of 1 unit different) x5 reps with 90% acc no cues.  Pt repeated CV/CVC x4 units (1 unit different) with max cues required.   Long Term Goal: Pt will communicate basic wants and needs via any modality with trained caregivers.      Articulatory kinematic drills CV x5 with >90% acc same units and max cues with 4:1 unit difference.      Patient Education/Response:   New POC reviewed and change in goals discussed. Pt agreed to all changes.     Written Home Exercises Provided: con't automatic language tasks, practice names  Exercises were reviewed and Yong was able to demonstrate them prior to the end of the session.  Yong demonstrated good  understanding of the education provided.         Assessment:   Yong is progressing well towards his goals.  Goals to be updated as needed.     Pt prognosis is Fair. Pt will continue to benefit from skilled outpatient speech and language therapy to address the deficits listed in the problem list on initial evaluation, provide pt/family education and to maximize pt's level of independence in the home and community environment.     KT NOMS (Functional Communication Measures):  KT NOMS: BREANAAL 24 CURRENT 24   Attention: 4 4   Memory: 4 5   Motor Speech: 3 4   Readin 4   Spoken Language Comprehension: 4 4   Spoken Language Expression: 2 3   Swallowin 7      Barriers to Therapy: prolonged period since CVA  Pt's spiritual, cultural and educational needs considered and pt agreeable to plan of care and goals.    Plan:   Continue POC with focus on functional communication of basic wants/needs.       Kate Olvera MA, CCC-SLP  09/10/2024

## 2024-09-12 ENCOUNTER — CLINICAL SUPPORT (OUTPATIENT)
Dept: REHABILITATION | Facility: HOSPITAL | Age: 44
End: 2024-09-12
Payer: OTHER GOVERNMENT

## 2024-09-12 DIAGNOSIS — I69.322 DYSARTHRIA AS LATE EFFECT OF CEREBELLAR CEREBROVASCULAR ACCIDENT (CVA): ICD-10-CM

## 2024-09-12 DIAGNOSIS — I69.320 APHASIA AS LATE EFFECT OF CEREBROVASCULAR ACCIDENT (CVA): ICD-10-CM

## 2024-09-12 DIAGNOSIS — I67.89 CEREBROVASCULAR DISEASE, ACUTE: Primary | ICD-10-CM

## 2024-09-12 PROCEDURE — 97530 THERAPEUTIC ACTIVITIES: CPT

## 2024-09-12 PROCEDURE — 97112 NEUROMUSCULAR REEDUCATION: CPT

## 2024-09-12 PROCEDURE — 92507 TX SP LANG VOICE COMM INDIV: CPT

## 2024-09-12 NOTE — PROGRESS NOTES
Occupational Therapy  Occupational Therapy Outpatient Progress Note      Date: 9/10/2024  Name: Yong Stallings  Clinic Number: [id number]  : 23  Visit Number: 12      Subjective     Pt in good spirits and motivated for session. Pt continues to demonstrate expressive aphasia/dysarthria, but verbalizing with cuing.   Yong without pain reports.    Patient's response to therapy: Yong tolerated session fair with good effort.  Continue to note increase in over all endurance with little reports of fatigued at end of session.    Objective     Current condition: Yong demonstrates severe right hemiplegia effecting functional transfers, ADLs, visual perception, and overall quality of life secondary to a CVA.      TREATMENT  Principle of treatment/treatment today: Transfer training, core strengthening, neuromotor re-ed,  ADL dressing training, and dynamic sitting/standing balance training.     Therapeutic Activity:  Began with transfer training wc to mat with sliding board.  Pt able to position wc and prepare for transfer with one verbal/tactile cue to find footrest release.  Pt able to place board with mod assist.  Pt able to perform transfer with min assist to begin then CGA remainder to left  while adjusting feet.  Transfer training sit to supine to right side.  Pt able to cross bilat ankles with SBA assist then transferred sit to supine min assist to raise BLE.  In supine PT performed progressive stretches to trunk and LEs.  While in supine with knees bent, fastened knees together with theraband, performed side to sdie knee rocking l/r SBA, bilateral hip flex, then bridges with min tactile assist.  OT performed right scapular mobilization followed by anterior capsular stretches.  Performed slow progressive stretch to elbow,  wrist, and digit flexors to neutral position.  Transfer training supine to sit.  Pt able to slide LLE off mat then min assist with RLE, mod assist required with upper trunk to perform crunch into  sitting.  Bed mobility scooting 3 feet to left min assist to adjust feet.  Transfer training performed sit to stands from elevated seat using rail with LUE.  Pt performed 6 reps, with min/mod assist and verbal cuing to sequence mechanics and maintain forward trunk flexion.  While in standing worked on weight shifting to unload LE followed by attempting to raise off floor.  Pt able to raise LLE on 3rd attempt.  Transfer training performed mat <> wc with sliding board min assist.  Pt positioned wc in front of wall with alphabet Rampart. Transferred sit to stand 2x's with mod assist pulling up on rail.  While in standing performed oculomotor scanning activity finding letters in alphabet Rampart.  Pt had difficulty with activity requiring constant verbal cues with about 10% accuracy.    Time In: 1215  Time Out: 1315  Total Appointment Time (timed & untimed codes): 60 minutes   Treatment time: Functional activities 45 minutes  Neuro re-education 15 minutes  Activities of Daily living  minutes    Assessment     Summary/Analysis of session: Pt seen in clinic setting.  Pt on time and well groomed.  PT unavailable to cotx this session. Noted pt continues to progress with sliding board transfer, nearly CGA to left.  Noted pt struggled with scanning activity secondary to visual deficits and apraxia.   Severe right hemiplegia with apraxia continues to hamper overall functional abilities.  Recommend continuing PLC to address functional deficits.    Progress toward previous goals: Continue STG/LTG   Goals:      LTG: (8 Weeks)   Pt will increase toilet transfers to min assist. (Progressing)  Pt with be able to don UE garments with min assist and LE garments with mod assist. (Progressing)  Pt will increase RUE function to be able to transfer supine to sit CGA from right side. (Progressing)     STG: (4 Weeks)   Pt will increase toilet transfers to mod assist stand pivot. (Progressing)  Pt will increase UE dressing to CGA and LE  dressing to mod assist. (Progressing)  Pt will increase RUE function to be able to transfer supine to sit with min assist from right side. (Progressing)    Plan   Next session to focus on core strength/mobility, dynamic sitting/standing bal training, ADL training, and education on HEP.    Follow Up  Follow up in: 2 days

## 2024-09-12 NOTE — PROGRESS NOTES
"  Speech and Language Therapy   Outpatient Progress Note    Date:  9/12/2024     Name: Yong Stallings   MRN: [id number]   Therapy Diagnosis:   Encounter Diagnoses   Name Primary?    Cerebrovascular disease, acute Yes    Aphasia as late effect of cerebrovascular accident (CVA)     Dysarthria as late effect of cerebellar cerebrovascular accident (CVA)        Physician: Mayito Langston MD  Physician Orders: speech eval and treat  Medical Diagnosis: CVA    Visit #/Visits authorized: 2/15  Date of Evaluation:  07/23/24  Insurance Authorization Period: 180 days  Plan of Care:      7/22/2024 to 10/22/2024     UNTIMED  Procedure Min.   {Speech- Language- Voice Therapy  45min / 1 unit           Total Untimed Units: 1  Charges Billed/# of units: 1  Time In:  11:45  Time Out:  12:30  Total Billable Time: 45 min / 1 unit     Precautions: Standard and Fall    Subjective:     Pt reports: Pt participated well and agreed to improvements in structured tasks. Frustration by end of session con't. Pt reported, "I know everything and nothing" when attempted to verbalized frustration with speech difficulty.       Objective:     Short term goals:     Pt will complete automatic language tasks at mod level with >90% intelligibility and min cues. Pt completed the following automatic tasks (no written stim today):  1-10: 5/5x independently  10-1: 2/4 independently   Days: 4/5 independently   Months: 0/2 independently     100% acc with phonemic cues with all above   Pt will verbalize name and personally relevant information via naming or wh-questions for basic communication with min phonemic cues.  Pt stated name x5/5 independently, no prolonged initiation time today. Pt named favorite foods from questions with <20% acc independently, >90% acc with mod phonemic cues   Pt will label personally relevant items with 75% acc min cues.  Pt labeled personally relevant pictured items with 90% acc carrier phrases only, increase to 100% with PC.   Pt will " comprehend personally relevant information at short story length with 75% acc no cues.  Pt demonstrated understanding of short story length and complex sentence information with 67% acc, increase to >85% acc with repetition. Education re: comprehension deficits completed.    Pt will complete articulatory kinematic drills with CV/VC x4 units (with max of 1 unit different) x5 reps with 90% acc no cues.  Pt repeated CV/CVC x4 units (1 unit different) with mod cues required. Mild perseveration noted today.    Long Term Goal: Pt will communicate basic wants and needs via any modality with trained caregivers.          Patient Education/Response:   Comprehension deficits education completed.     Written Home Exercises Provided: CV/CVC x4 units; mirror work with phonemes  Exercises were reviewed and Yong was able to demonstrate them prior to the end of the session.  Yong demonstrated good  understanding of the education provided.         Assessment:   Yong is progressing well towards his goals.  Goals to be updated as needed.     Pt prognosis is Fair. Pt will continue to benefit from skilled outpatient speech and language therapy to address the deficits listed in the problem list on initial evaluation, provide pt/family education and to maximize pt's level of independence in the home and community environment.     KT NOMS (Functional Communication Measures):  KT NOMS: EVAL 24 CURRENT 24   Attention: 4 4   Memory: 4 5   Motor Speech: 3 4   Readin 4   Spoken Language Comprehension: 4 4   Spoken Language Expression: 2 3   Swallowin 7      Barriers to Therapy: prolonged period since CVA  Pt's spiritual, cultural and educational needs considered and pt agreeable to plan of care and goals.    Plan:   Continue POC with focus on functional communication of basic wants/needs.       Kate Olvera MA, CCC-SLP  2024

## 2024-09-13 ENCOUNTER — CLINICAL SUPPORT (OUTPATIENT)
Dept: REHABILITATION | Facility: HOSPITAL | Age: 44
End: 2024-09-13
Payer: OTHER GOVERNMENT

## 2024-09-13 DIAGNOSIS — I69.320 APHASIA AS LATE EFFECT OF CEREBROVASCULAR ACCIDENT (CVA): ICD-10-CM

## 2024-09-13 DIAGNOSIS — I67.89 CEREBROVASCULAR DISEASE, ACUTE: Primary | ICD-10-CM

## 2024-09-13 DIAGNOSIS — I69.322 DYSARTHRIA AS LATE EFFECT OF CEREBELLAR CEREBROVASCULAR ACCIDENT (CVA): ICD-10-CM

## 2024-09-13 PROCEDURE — 92507 TX SP LANG VOICE COMM INDIV: CPT

## 2024-09-13 NOTE — PROGRESS NOTES
Occupational Therapy  Occupational Therapy Outpatient Progress Note      Date: 2024  Name: Yong Stallings  Clinic Number: [id number]  : 23  Visit Number: 14      Subjective     Pt in good spirits and motivated for session. Pt continues to demonstrate expressive aphasia/dysarthria, but verbalizing with cuing.   Yong without pain reports today.  Spoke with father and CG on progress with sliding board transfers.  Father reported that they have one at home and will begin to use it for wc<>bed transfers.    Patient's response to therapy: Yong tolerated session fair with good effort.      Objective     Current condition: Yong demonstrates severe right hemiplegia effecting functional transfers, ADLs, visual perception, and overall quality of life secondary to a CVA.      TREATMENT  Principle of treatment/treatment today: Transfer training, core strengthening, neuromotor re-ed,  ADL dressing training, and dynamic sitting/standing balance training.     Therapeutic Activity:  Began with transfer training wc to mat with sliding board.  Pt able to position wc and prepare for transfer SBA without verbal or tactile assist.  Pt able to place board with mod assist.  Pt able to perform transfer with min assist to begin then CGA remainder to left while adjusting feet.  Transfer training sit to supine to right side.  Pt able to cross bilat ankles with SBA assist then transferred sit to supine min assist to raise BLE.  In supine PT performed progressive stretches to trunk and LEs.  While in supine with knees bent, fastened knees together with theraband, performed side to side knee rocking l/r SBA, bilateral hip flex, then bridges with min tactile assist.  OT performed right scapular mobilization followed by anterior capsular stretches.  Performed slow progressive stretch to elbow, wrist, and digit flexors to neutral position.  Transfer training supine to sit.  Pt able to slide LLE off mat then gait belt looped around right foot  as a led , pt able to then slide RLE off mat SBA.  Mod assist required with upper trunk to perform crunch into sitting.   Performed RUE weight bearing, visual scanning activity reaching and placing large pegs across midline 20 reps.  Pt able to follow color pattern with 1 verbal cue.   Bed mobility scooting 3 feet to left min assist to adjust feet.  Transfer training performed sit to stands from elevated seat using rail with LUE.  Pt performed 4 reps, with min/mod assist and verbal cuing to sequence mechanics and maintain forward trunk flexion.  While in standing worked on weight shifting to unload LE followed by 5 attempts to raise LE off floor.  Pt able to raise LLE all 5 attempts ea rep,  max assist required to raise RLE 5 attempts ea rep.  Transfer training performed mat <> wc with sliding board min assist to right.      Time In: 1220  Time Out: 1315  Total Appointment Time (timed & untimed codes): 55 minutes   Treatment time: Functional activities 25 minutes  Neuro re-education 20 minutes    Assessment     Summary/Analysis of session: Pt seen in clinic setting.  Pt on time and well groomed.  PT unavailable to cotx this session. Noted pt continues to progress with sliding board transfer, nearly CGA to left.  Noting increase in core strength with decreased LOB with seated activities.  Severe right hemiplegia with apraxia continues to hamper overall functional abilities.  Recommend continuing PLC to address functional deficits.    Progress toward previous goals: Continue STG/LTG   Goals:      LTG: (8 Weeks)   Pt will increase toilet transfers to min assist. (Progressing)  Pt with be able to don UE garments with min assist and LE garments with mod assist. (Progressing)  Pt will increase RUE function to be able to transfer supine to sit CGA from right side. (Progressing)     STG: (4 Weeks)   Pt will increase toilet transfers to mod assist stand pivot. (Progressing)  Pt will increase UE dressing to CGA and LE  dressing to mod assist. (Progressing)  Pt will increase RUE function to be able to transfer supine to sit with min assist from right side. (Progressing)    Plan   Next session to focus on core strength/mobility, dynamic sitting/standing bal training, ADL training, and education on HEP.    Follow Up  Follow up in: 5 days

## 2024-09-13 NOTE — PROGRESS NOTES
"  Speech and Language Therapy   Outpatient Progress Note    Date:  9/13/2024     Name: Yong Stallings   MRN: [id number]   Therapy Diagnosis:   Encounter Diagnoses   Name Primary?    Cerebrovascular disease, acute Yes    Aphasia as late effect of cerebrovascular accident (CVA)     Dysarthria as late effect of cerebellar cerebrovascular accident (CVA)        Physician: Mayito Langston MD  Physician Orders: speech eval and treat  Medical Diagnosis: CVA    Visit #/Visits authorized: 3/15  Date of Evaluation:  07/23/24  Insurance Authorization Period: 180 days  Plan of Care:      7/22/2024 to 10/22/2024     UNTIMED  Procedure Min.   {Speech- Language- Voice Therapy  40min / 1 unit           Total Untimed Units: 1  Charges Billed/# of units: 1  Time In:  11:50  Time Out:  12:30  Total Billable Time: 40 min / 1 unit     Precautions: Standard and Fall    Subjective:     Pt reports: Pt reported "I need to go back (to sleep)" and demonstrated lethargy initially.       Objective:     Short term goals:     Pt will complete automatic language tasks at mod level with >90% intelligibility and min cues. Pt completed the following automatic tasks (no written stim today):  1-10: 5/5x PC required  10-1: 2/2 PC required    Pt attempted 1-21 with written stim with min cues and was unable to complete 21-1 despite written stim and cues.   Pt will verbalize name and personally relevant information via naming or wh-questions for basic communication with min phonemic cues.  Pt stated name x10/10 independently, no prolonged initiation time today. Pt named family names with max cues.   Pt will label personally relevant items with 75% acc min cues.     Pt will comprehend personally relevant information at short story length with 75% acc no cues.     Pt will complete articulatory kinematic drills with CV/VC x4 units (with max of 1 unit different) x5 reps with 90% acc no cues.  Pt repeated CV/CVC x4 units (1 unit different) with mod cues required. "     Pt completed AK drills with CV/VC common words with min-max cues.    Long Term Goal: Pt will communicate basic wants and needs via any modality with trained caregivers.          Patient Education/Response:   Motor speech vs language education completed again.     Written Home Exercises Provided: CV/CVC x4 units; mirror work with phonemes  Exercises were reviewed and Yong was able to demonstrate them prior to the end of the session.  Yong demonstrated good  understanding of the education provided.         Assessment:   Yong is progressing well towards his goals.  Goals to be updated as needed.     Pt prognosis is Fair. Pt will continue to benefit from skilled outpatient speech and language therapy to address the deficits listed in the problem list on initial evaluation, provide pt/family education and to maximize pt's level of independence in the home and community environment.     KT NOMS (Functional Communication Measures):  KT NOMS: KANDI 24 CURRENT 24   Attention: 4 4   Memory: 4 5   Motor Speech: 3 4   Readin 4   Spoken Language Comprehension: 4 4   Spoken Language Expression: 2 3   Swallowin 7      Barriers to Therapy: prolonged period since CVA  Pt's spiritual, cultural and educational needs considered and pt agreeable to plan of care and goals.    Plan:   Continue POC with focus on functional communication of basic wants/needs.       Kate Olvera MA, CCC-SLP  2024

## 2024-09-16 ENCOUNTER — CLINICAL SUPPORT (OUTPATIENT)
Dept: REHABILITATION | Facility: HOSPITAL | Age: 44
End: 2024-09-16
Payer: OTHER GOVERNMENT

## 2024-09-16 DIAGNOSIS — I69.322 DYSARTHRIA AS LATE EFFECT OF CEREBELLAR CEREBROVASCULAR ACCIDENT (CVA): ICD-10-CM

## 2024-09-16 DIAGNOSIS — I69.320 APHASIA AS LATE EFFECT OF CEREBROVASCULAR ACCIDENT (CVA): ICD-10-CM

## 2024-09-16 DIAGNOSIS — I67.89 CEREBROVASCULAR DISEASE, ACUTE: Primary | ICD-10-CM

## 2024-09-16 PROCEDURE — 92507 TX SP LANG VOICE COMM INDIV: CPT

## 2024-09-16 NOTE — PROGRESS NOTES
Speech and Language Therapy   Outpatient Progress Note    Date:  9/16/2024     Name: Yong Stallings   MRN: [id number]   Therapy Diagnosis:   Encounter Diagnoses   Name Primary?    Cerebrovascular disease, acute Yes    Aphasia as late effect of cerebrovascular accident (CVA)     Dysarthria as late effect of cerebellar cerebrovascular accident (CVA)        Physician: Mayito Langston MD  Physician Orders: speech eval and treat  Medical Diagnosis: CVA    Visit #/Visits authorized: 4/15  Date of Evaluation:  07/23/24  Insurance Authorization Period: 180 days  Plan of Care:      7/22/2024 to 10/22/2024     UNTIMED  Procedure Min.   {Speech- Language- Voice Therapy  45min / 1 unit           Total Untimed Units: 1  Charges Billed/# of units: 1  Time In:  12:50  Time Out:  13:35  Total Billable Time: 45 min / 1 unit     Precautions: Standard and Fall    Subjective:     Pt reports: Pt reported feeling good and participated well.      Objective:     Short term goals:     Pt will complete automatic language tasks at mod level with >90% intelligibility and min cues. Pt completed the following automatic tasks (no written stim today):  1-10: 1/5x independently, 100% with cues  10-1: 3/3 independently    Pt unable to verbally count money despite counting and matching adequately in head.    Pt will verbalize name and personally relevant information via naming or wh-questions for basic communication with min phonemic cues.  Pt stated name x5/5 independently.  Pt named food from verbal questions with 50% no cues, and 100% with phonemic cues.   Pt will label personally relevant items with 75% acc min cues.  Pt labeled pictures of personally relevant food and transportation items with <50% acc no cues, 100% with PC.   Pt will comprehend personally relevant information at short story length with 75% acc no cues.     Pt will complete articulatory kinematic drills with CV/VC x4 units (with max of 1 unit different) x5 reps with 90% acc no  cues.     Long Term Goal: Pt will communicate basic wants and needs via any modality with trained caregivers.      Pt able to count money to written amount with 100% acc (<$2). Pt unable to complete any math with money. Reduced insight to cognitive deficit demonstrated.     Patient Education/Response:   Apraxia vs aphasia education completed.     Written Home Exercises Provided: CV/CVC x4 units; mirror work with phonemes  Exercises were reviewed and Yong was able to demonstrate them prior to the end of the session.  Yong demonstrated good  understanding of the education provided.         Assessment:   Yong is progressing well towards his goals.  Goals to be updated as needed.     Pt prognosis is Fair. Pt will continue to benefit from skilled outpatient speech and language therapy to address the deficits listed in the problem list on initial evaluation, provide pt/family education and to maximize pt's level of independence in the home and community environment.     KT NOMS (Functional Communication Measures):  KT NOMS: BREANAAL 24 CURRENT 24   Attention: 4 4   Memory: 4 5   Motor Speech: 3 4   Readin 4   Spoken Language Comprehension: 4 4   Spoken Language Expression: 2 3   Swallowin 7      Barriers to Therapy: prolonged period since CVA  Pt's spiritual, cultural and educational needs considered and pt agreeable to plan of care and goals.    Plan:   Continue POC with focus on functional communication of basic wants/needs.       Kate Olvera MA, CCC-SLP  2024

## 2024-09-17 ENCOUNTER — CLINICAL SUPPORT (OUTPATIENT)
Dept: REHABILITATION | Facility: HOSPITAL | Age: 44
End: 2024-09-17
Payer: OTHER GOVERNMENT

## 2024-09-17 DIAGNOSIS — I67.89 CEREBROVASCULAR DISEASE, ACUTE: Primary | ICD-10-CM

## 2024-09-17 PROCEDURE — 97530 THERAPEUTIC ACTIVITIES: CPT

## 2024-09-17 PROCEDURE — 97110 THERAPEUTIC EXERCISES: CPT

## 2024-09-18 ENCOUNTER — CLINICAL SUPPORT (OUTPATIENT)
Dept: REHABILITATION | Facility: HOSPITAL | Age: 44
End: 2024-09-18
Payer: OTHER GOVERNMENT

## 2024-09-18 DIAGNOSIS — I69.320 APHASIA AS LATE EFFECT OF CEREBROVASCULAR ACCIDENT (CVA): ICD-10-CM

## 2024-09-18 DIAGNOSIS — I69.322 DYSARTHRIA AS LATE EFFECT OF CEREBELLAR CEREBROVASCULAR ACCIDENT (CVA): ICD-10-CM

## 2024-09-18 DIAGNOSIS — I67.89 CEREBROVASCULAR DISEASE, ACUTE: Primary | ICD-10-CM

## 2024-09-18 PROCEDURE — 92507 TX SP LANG VOICE COMM INDIV: CPT

## 2024-09-18 NOTE — PROGRESS NOTES
Occupational Therapy  Occupational Therapy Outpatient Progress Note      Date: 2024  Name: Yong Stallings  Clinic Number: [id number]  : 23  Visit Number: 14      Subjective     Pt in good spirits and motivated for session. Pt continues to demonstrate expressive aphasia/dysarthria, but verbalizing with cuing.   Yong without pain reports today.      Patient's response to therapy: Yong tolerated session fair with good effort.      Objective     Current condition: Yong demonstrates severe right hemiplegia effecting functional transfers, ADLs, visual perception, and overall quality of life secondary to a CVA.      TREATMENT  Principle of treatment/treatment today: Transfer training, core strengthening, neuromotor re-ed, and dynamic sitting/standing balance training.     Therapeutic Activity:  Cotx performed with PT secondary to number of skilled hands required to perform higher level activities. Began with transfer training wc to mat with sliding board.  Pt able to position wc and prepare for transfer SBA with one verbal cue.  Pt able to place board with min assist.  Pt able to perform transfer with min assist to begin then CGA remainder to left while adjusting feet.  Transfer training sit to supine to right side.  Pt able to cross bilat ankles with one tactile assist then transferred sit to supine min assist to raise BLE.  In supine PT performed progressive stretches to trunk and LEs.  While in supine with knees bent, fastened knees together with theraband, performed side to side knee rocking l/r SBA then bridges 10 reps ea with SBA.  OT performed right scapular mobilization followed by anterior capsular stretches.  Performed slow progressive stretch to elbow, wrist, and digit flexors to neutral position.  Transfer training supine to sit.  Pt able to slide LLE off mat then gait belt looped around right foot as a leg , pt able to then slide RLE off mat SBA.  Mod assist required with upper trunk to perform  crunch into sitting.  Worked on trunk control reaching and placing cones with LUE from floor across midline to right 10 reps.  Noted definite increase in balance and core strength, was able to perform without LOB or tactile corrections.   Performed core strengthening with mini situps 5 reps with mod assist pulling up with BUEs  Bed mobility scooting 3 feet to left min assist to adjust feet.  Transfer training performed sit to stands from elevated seat using rail with LUE.  Pt performed 3 reps, with min/mod assist and verbal cuing to sequence mechanics and maintain forward trunk flexion.  While in standing worked on weight shifting to unload LE followed by 5 attempts to raise LE off floor.  Pt able to raise LLE all 5 attempts ea rep.  Transfer training performed mat <> wc with sliding board min assist to right after setup.     Time In: 1200  Time Out: 1300  Total Appointment Time (timed & untimed codes): 60 minutes   Treatment time: Functional activities 30 minutes  Neuro re-education   minutes    Assessment     Summary/Analysis of session: Pt seen in clinic setting.  Pt early for session and well groomed. Cotx performed with PT secondary to number of skilled hands required to perform higher level activities.  Noted pt continues to progress with sliding board transfer, nearly CGA to left.  Noting increase in core strength with decreased LOB with seated activities.  Severe right hemiplegia with apraxia continues to hamper overall functional abilities.  Recommend continuing PLC to address functional deficits.    Progress toward previous goals: Continue STG/LTG   Goals:      LTG: (8 Weeks)   Pt will increase toilet transfers to min assist. (Progressing)  Pt with be able to don UE garments with min assist and LE garments with mod assist. (Progressing)  Pt will increase RUE function to be able to transfer supine to sit CGA from right side. (Progressing)     STG: (4 Weeks)   Pt will increase toilet transfers to mod assist  stand pivot. (Progressing)  Pt will increase UE dressing to CGA and LE dressing to mod assist. (Progressing)  Pt will increase RUE function to be able to transfer supine to sit with min assist from right side. (Progressing)    Plan   Next session to focus on core strength/mobility, dynamic sitting/standing bal training, ADL training, and reassessment.    Follow Up  Follow up in: 2 days

## 2024-09-18 NOTE — PROGRESS NOTES
[facility]                              Daily Treatment Note          Name: Yong Stallings      Therapy Diagnosis:   Encounter Diagnosis   Name Primary?    Cerebrovascular disease, acute Yes     Physician: Mayito Langston MD    Visit Date: 9/17/2024        Time In: 1200  Time Out: 1300  Total Billable Time: 60 minutes    Precautions: Fall        Subjective     Pt reports: no new complaints. Appeared eager to participate in therapy.       Pain: 0/10        Objective     Yong received therapeutic exercises to develop strength, endurance, ROM, flexibility, posture, and core stabilization for 15 minutes including:      Exercise Sets Reps Comments   BLE supine stretching/ROM 1 10 PT assisted pt with manual LE stretching and gentle PROM.  PT assisted with B knee flexion/extension, B hip flexion, B hip IR/ER, B adductor and HS stretching in preparation for mobility. Continued joint instability noted in the L knee with terminal extension.                   LTR 1 10 OT applied a gait belt around the thighs to bind pt's legs together.  Pt performed LTR x 10 without any assistance required.    Bridging 1 10 With the legs bound, pt also performed 10 reps of bridging.  PT Stabilized pt's RLE.   Modified sit ups 1 10 PT was positioned behind pt with a pillow on the lap.  Pt was seated at the edge of the mat with OT anteriorly.  OT held pt's B hands and instructed pt to control his descent onto the pillow placed on PT's lap.  OT allowed pt to utilize his LUE as minimally as possible to return to upright sitting.           Yong participated in dynamic functional therapeutic activities to improve functional performance for 45  minutes, including:       Assist Level Assistive Device Comments    Bed Mobility ModA  Pt mobilized from sitting at the edge of the mat into supine.  Pt crossed his feet prior to descending onto the mat. Pt laid to his L side and was able to control  "the descent of his trunk using his LUE.  OT provided assistance with lower extremities . A gait belt was looped around pt's R foot to chung as a "leg " when returning to sitting.  Pt was able to mobilize B legs off the edge of the mat.  OT then provided pt with a hand to pull himself into sitting using his LUE.  PT was positioned posteriorly for supervision.     Sit to stand/Stand to sit Hailey  Pt performed 1 trial of standing with OT assisting anteriorly. Pt wrapped his LUE around OT's shoulders.  OT blocked pt's R knee to prevent buckling.  PT made adjustments to pt's sitting surface while pt stood briefly with OT.    Three trials of sit to stand/stand to sit transitions were performed from the edge of the mat.  Pt sat on ONE blue foam pad to raise his sitting surface.  Pt utilized the railing positioned to his L to assist himself into standing.  OT was positioned anteriorly but provided pt with less assistance during both ascent and descent.  While standing, pt performed R and L lateral weight shifting.  When shifting toward the R, pt was instructed to remove his L foot from the floor for a small march.  Pt performed this task x 5 reps.     During one trial of standing, pt was instructed to remove his L hand from the railing and stand unsupported.  OT blocked pt's R knee to prevent buckling.  PT was positioned posteriorly to supervise. Pt performed this activity x 5 reps, holding his balance for 12 seconds on the final trial.    Balance Training SBA  Pt performed dynamic sitting balance at the edge of the mat.  Pt reached for cones positioned on the floor between his feet. After retrieiving a cone, pt returned to upright sitting and handed the cone to OT on his R side. Pt was forced to reach across his body and toward the upper quadrant.  Pt performed this activity x 15 reps. Much improved trunk control and balance observed.    Wheelchair Mobility Hailey-modA  Pt able to position his WC at the edge of the mat " in order to prepare for lateral sliding board transfers. PT and OT observed pt's sequencing including the following:  locking brakes, removing the L sided armrest, removing the L leg rest, and positioning the sliding board.  Some difficulty was noted with locating the leg rest lever.    Pt also performed WC mobility x 15 feet.  Pt was able to better utilize his LLE but required consistent cueing for technique.  PT had to provide assistance with steering the chair.      Stair Climbing      Car Transfer      Other:  Lola-modA  Lateral sliding board t/f performed from the WC to the low mat and from the mat back to the WC.  When transitioning to the mat, pt scooted toward his L side.  Pt was able to initiate the scoot and complete the remainder of the transition with only Lola.  Upon returning to the WC, pt had to transition to his R side. Father present to observe this transfer.  Pt transitioned from one blue foam mat to the WC.  Pt required assistance to get onto the board, but was able to perform the remainder of the transition with assistance only for adjustment of his feet.  Only slight assistance was provided with placing the sliding board, but pt was able to remove the board on his own.  With increased practice with this skill, this style of transfer could greatly reduce caregiver burden within the home environment, while also allowing for increased independence for the pt.           Assessment     Pt tolerated session well and was motivated to participate in all activities.  Co-treatment performed with OT due to the need for two sets of skilled hands for safety with more complex activities/tasks.  Pt continues to progress toward his functional goals and his trunk stability and independence with transitional movements continues to improve. PT feels that with continued practice, pt could potentially utilize lateral sliding board transfers within the home environment.  Father was allowed to observe pt transitioning  "from the mat back to his WC.  OT verbally walked pt's father throughout the technique.  He confirmed that the sliding board they have at home "looks just like the one we use".  This type of transfer should allow for increased independence and less reliance on caregivers.  Overall, PT continues to target basic mobility tasks and utilization of strategies that will promote increased independence with daily living.      Yong Is progressing well towards his goals.   Pt prognosis is Fair.     Pt will continue to benefit from skilled outpatient physical therapy to address the deficits listed in the problem list box on initial evaluation, provide pt/family education and to maximize pt's level of independence in the home and community environment.     Pt's spiritual, cultural and educational needs considered and pt agreeable to plan of care and goals.    Anticipated barriers to physical therapy: decreased insight into deficits, length of time since CVA    Goals:     See initial evaluation        Plan     Will plan to continue to see pt for PT services in coordination with OT in an effort to maximize pt's independence with basic mobility and reduce caregiver burden.      Cliff Hollis, PT, DPT                 "

## 2024-09-18 NOTE — PROGRESS NOTES
Speech and Language Therapy   Outpatient Progress Note    Date:  9/18/2024     Name: Yong Stallings   MRN: [id number]   Therapy Diagnosis:   Encounter Diagnoses   Name Primary?    Cerebrovascular disease, acute Yes    Aphasia as late effect of cerebrovascular accident (CVA)     Dysarthria as late effect of cerebellar cerebrovascular accident (CVA)        Physician: Mayito Langston MD  Physician Orders: speech eval and treat  Medical Diagnosis: CVA    Visit #/Visits authorized: 5/15  Date of Evaluation:  07/23/24  Insurance Authorization Period: 180 days  Plan of Care:      7/22/2024 to 10/22/2024     UNTIMED  Procedure Min.   {Speech- Language- Voice Therapy  45min / 1 unit           Total Untimed Units: 1  Charges Billed/# of units: 1  Time In:  11:50  Time Out:  12:35  Total Billable Time: 45 min / 1 unit     Precautions: Standard and Fall    Subjective:     Pt reports: Pt reported frustration by end of session, but remains motivated and participated well.       Objective:     Short term goals:     Pt will complete automatic language tasks at mod level with >90% intelligibility and min cues. Pt completed the following automatic tasks (no written stim today):  1-10: 1/2x independently, 100% with cues  10-1: 1/2 independently, 100% with cues  ABC's: 22/26 independently     Pt will verbalize name and personally relevant information via naming or wh-questions for basic communication with min phonemic cues.  Pt stated name x5/5 independently.     Pt will label personally relevant items with 75% acc min cues.  Pt labeled pictures of personally relevant items with <25% acc no cues, 100% with PC.   Pt will comprehend personally relevant information at short story length with 75% acc no cues.  Pt answered yes/no questions re: short stories with 60% acc. Comprehension deficits discussed.    Pt will complete articulatory kinematic drills with CV/VC x4 units (with max of 1 unit different) x5 reps with 90% acc no cues.  Pt  "completed articulatory kinematic drills within naming tasks. Significant difficulty with /r, d, and m/ noted. Single drills and CV drills completed with max visual and auditory cues.    Long Term Goal: Pt will communicate basic wants and needs via any modality with trained caregivers.      Pt matched word to picture with 84% acc - Pt incorrect answers were only the first 4 items. Pt's brain seems to need to "turn on" with specific task. Where all questions are incorrect and then suddenly all questions are answered correctly.     Patient Education/Response:     Comprehension deficits education completed.     Written Home Exercises Provided: alphabet practice - worksheet provided  Exercises were reviewed and Yong was able to demonstrate them prior to the end of the session.  Yong demonstrated good  understanding of the education provided.         Assessment:   Yong is progressing well towards his goals.  Goals to be updated as needed.     Pt prognosis is Fair. Pt will continue to benefit from skilled outpatient speech and language therapy to address the deficits listed in the problem list on initial evaluation, provide pt/family education and to maximize pt's level of independence in the home and community environment.     KT NOMS (Functional Communication Measures):  KT NOMS: BREANAAL 24 CURRENT 24   Attention: 4 4   Memory: 4 5   Motor Speech: 3 4   Readin 4   Spoken Language Comprehension: 4 4   Spoken Language Expression: 2 3   Swallowin 7      Barriers to Therapy: prolonged period since CVA  Pt's spiritual, cultural and educational needs considered and pt agreeable to plan of care and goals.    Plan:   Continue POC with focus on functional communication of basic wants/needs.       Kate Olvera MA, CCC-SLP  2024                                                 "

## 2024-09-19 ENCOUNTER — CLINICAL SUPPORT (OUTPATIENT)
Dept: REHABILITATION | Facility: HOSPITAL | Age: 44
End: 2024-09-19
Payer: OTHER GOVERNMENT

## 2024-09-19 DIAGNOSIS — I67.89 CEREBROVASCULAR DISEASE, ACUTE: Primary | ICD-10-CM

## 2024-09-19 PROCEDURE — 97530 THERAPEUTIC ACTIVITIES: CPT

## 2024-09-19 PROCEDURE — 97110 THERAPEUTIC EXERCISES: CPT

## 2024-09-19 NOTE — PROGRESS NOTES
[facility]   OUTPATIENT THERAPY      Occupational Therapy Reassessment     Date: 24  Name: Yong Stallings  Clinic Number: [id number]  : 24        Therapy Diagnosis:        Encounter Diagnoses   Name Primary?    Other cerebrovascular disease Yes    Cerebrovascular disease, acute        Physician: Mayito Langston MD     Physician Orders: OT Evaluate and Treat  Medical Diagnosis: Cerebrovascular Disease  Surgical Procedure and Date: N/A  Plan of Care Expiration: 10/25/24  Visit # / Visits authorized: 15 / 15     Precautions:  Seizure     Medical History:   No past medical history on file.     Surgical History:    has no past surgical history on file.     Medications:   currently has no medications in their medication list.     Allergies:   Review of patient's allergies indicates:  Not on File      SUBJECTIVE      Pt in good spirits and remains highly motivated.  Yong continues to demonstrate expressive aphasia with dysarthria but noted definite improvement in phonation and automatic responses.  Pt without reports of pain at rest or activity.           OBJECTIVE        Current condition: Yong demonstrates severe right hemiplegia effecting functional transfers, ADLs, visual perception, and overall quality of life secondary to a CVA.     TREATMENT  Principle of treatment/treatment today: Transfer training, core strengthening, dynamic sitting/standing balance training, and reassessment.      Therapeutic Activity:  Cotx performed with PT secondary to number of skilled hands required to perform higher level activities.  Pt able to position wc and prepare for transfer SBA.  Transfer training wc to mat.  After verbal cuing pt able to scoot to front of wc, transfer sit to stand min assist, then required max assist to pivot to left.  Transfer training sit to supine to right side.  Pt able to cross bilat ankles SBA then transferred sit to supine min assist to raise BLE.  In supine PT  performed progressive stretches to trunk and LEs.  While in supine with knees bent, fastened knees together with gait belt, performed side to side knee rocking l/r SBA then bridges 15 reps ea with SBA.  OT performed right scapular mobilization followed by anterior capsular stretches.  Performed slow progressive stretch to elbow, wrist, and digit flexors to neutral position.  Transfer training supine to sit.  Pt able to slide LLE off mat then gait belt looped around right foot as a leg , pt able to then slide RLE off mat SBA.  Mod assist required to rotate upper trunk forward onto LLE then pt able to push up with min assist.  Performed core strengthening with mini situps 5 reps with min/mod assist pulling up with BUEs.  Bed mobility scooting 3 feet to left min assist to adjust feet.  Transfer training performed sit to stands from elevated seat using rail with LUE.  Pt performed 3 reps, with min/mod assist and verbal cuing to sequence mechanics and maintain forward trunk flexion.  While in standing worked on weight shifting to unload LLE followed by 5 attempts to raise left heel of floor to assist with stand pivot transfers.  Pt able to raise heel all 5 attempts ea rep.  Transfer training performed mat <> wc with sliding board, after setup pt able to transfer into wc with verbal cuing and min assist to adjust feet.       ASSESSMENT     Yong Stallings is a 44 y.o. male referred to outpatient occupational therapy and presents with a medical diagnosis of CVA with right hemiplegia.  Yong has attended 15 visits of OT and has demonstrated definite functional improvements.  Yong has increased functional transfers sit to stand to min assist from wheelchair.  He has increase sliding board transfers to min assist.  He has increase UE dressing to min assist.  Due to the severity of the deficits in his RUE the goals to increase function are discontinued and the focus has shifted to contracture prevention.  OT is recommending  continuation of services to increase functional transfers, dynamic sitting/standing balance, and ADL function to improve the quality of live and decrease burden of care.                        Goals:      LTG: (16 Weeks)   Pt will increase toilet transfers to min assist. (Progressing)  Pt with be able to don UE garments with min assist and LE garments with mod assist. (Partially Met, Min assist with UE dressing)  Pt will increase RUE function to be able to transfer supine to sit CGA from right side. (Discontinue)     STG: (4 Weeks)   Pt will increase toilet transfers to mod assist stand pivot. (Goal Met)  Pt will increase UE dressing to mod assist and LE dressing to max assist. (Goal Met)  Pt will increase RUE function to be able to transfer supine to sit with min assist from right side. (Discontinue)     New STG 09/19/24 (Complete in 6 weeks)  Pt will increase toilet transfers to min assist.  Pt will increase UE dressing to SBA.  Pt will increase sliding board transfer to CGA wc<>bed.        PLAN   Plan of Care Certification: 7/26/2024 to 10/25/24.      Recommend continuing Outpatient Occupational Therapy 2 times weekly for 8 weeks to include the following interventions: Manual therapy/joint mobilizations, Modalities for pain management, Therapeutic exercises/activities., Strengthening, Transfer training, ADL Training, Neuro reeducation and education.     Time In: 1200  Time Out: 1255  Total Appointment Time (timed & untimed codes): 55 minutes        BEN James

## 2024-09-19 NOTE — PROGRESS NOTES
[facility]                              Daily Treatment Note          Name: Yong Stallings      Therapy Diagnosis:   Encounter Diagnosis   Name Primary?    Cerebrovascular disease, acute Yes     Physician: Mayito Langston MD    Visit Date: 9/19/2024        Time In: 1200  Time Out: 1300  Total Billable Time: 60 minutes    Precautions: Fall        Subjective     Pt reports: no new complaints. Appeared in good spirits and eager to participate in therapy.       Pain: 0/10        Objective     Yong received therapeutic exercises to develop strength, endurance, ROM, flexibility, posture, and core stabilization for 15 minutes including:      Exercise Sets Reps Comments   BLE supine stretching/ROM 1 10 PT assisted pt with manual LE stretching and gentle PROM.  PT assisted with B knee flexion/extension, B hip flexion, B hip IR/ER, B adductor and HS stretching in preparation for mobility. Continued joint instability noted in the L knee with terminal extension.                   LTR 1 12 OT applied a gait belt around the thighs to bind pt's legs together.  Pt performed LTR x 12 without any assistance required.    Bridging 1 15 With the legs bound, pt also performed 15 reps of bridging.  PT Stabilized pt's RLE.   B knee to chest 1 5 Assistance was required to bring B knees to chest with the legs bound.  OT assisted with flexion and PT resisted L sided hip extension when bringing the feet back down to the mat.     Modified sit ups 1 8 PT was positioned behind pt with a pillow on the lap.  Pt was seated at the edge of the mat with OT anteriorly.  OT held pt's B hands and instructed pt to control his descent onto the pillow placed on PT's lap.  OT allowed pt to utilize his LUE as minimally as possible to return to upright sitting.           Yong participated in dynamic functional therapeutic activities to improve functional performance for 45  minutes, including:        "Assist Level Assistive Device Comments    Bed Mobility ModA  Pt mobilized from sitting at the edge of the mat into supine.  Pt crossed his feet prior to descending onto the mat. Pt laid to his L side and was able to control the descent of his trunk using his LUE.  PT provided assistance with lower extremities . A gait belt was looped around pt's R foot to serve as a "leg " when returning to sitting.  Pt was able to mobilize B legs off the edge of the mat.  OT then assisted pt to roll toward his L side in order to better utilize his LUE to push himself into upright  PT was positioned posteriorly for supervision.     Sit to stand/Stand to sit Hailey  Two trials of sit to stand/stand to sit transitions were performed from the edge of the mat.  Pt sat on ONE blue foam pad to raise his sitting surface.  Pt utilized the railing positioned to his L to assist himself into standing.  OT was positioned anteriorly but provided pt with less assistance during both ascent and descent.  While standing, pt performed R and L lateral weight shifting.  When shifting toward the R, pt was instructed to remove his L heel from the floor.  OT blocked pt's R knee to prevent buckling.  During another trial of standing, pt was assisted with TKEs on the R.  OT facilitated pt's R quad, but very little to no voluntary contraction was elicited.     Balance Training SBA  Pt performed dynamic sitting balance at the edge of the mat via R and L weight shifting onto the elbows.  Pt's thighs were bound with a gait belt during this activity.  OT assisted with weight bearing through the RUE.  PT was positioned posteriorly and provided tactile cueing to pt's obliques for increased utilization.  OT removed pt's L hand from the mat to avoid over-utilization of the UE. Pt performed this activity x 10 trials.    Wheelchair Mobility Hailey-modA  Pt able to position his WC at the edge of the mat in order to prepare for transfers. PT and OT observed pt's " sequencing.  Some difficulty was noted with locating the leg rest lever.    Pt also performed WC mobility x 15 feet.  Pt was able to better utilize his LLE.  Facilitation provided pt's quad and HS.     Stair Climbing      Car Transfer      Other:  Lola-maxA  A stand pivot t/f was performed from WC to mat with OT assisting anteriorly. Pt was able to stand from WC level and clear his buttocks with Lola.  Pt experienced difficulty pivoting toward the mat.  MaxA required from OT and PT assisted with turning pt's L foot.  Upon transitioning back to the WC, pt performed a lateral sliding board t/f from the mat.  OT assisted with placing the sliding board. Pt was able to transition onto the board without assistance.  Some cueing required for appropriate hand placement.  Only minimal assistance required to clear the edge of the WC.  Pt's independence with this type of transition continues to improve.          Assessment     Pt tolerated session well and was motivated to participate in all activities.  Co-treatment performed with OT due to the need for two sets of skilled hands for safety with more complex activities/tasks.  OT performed a reassessment on today's date.  Increased visits will be requested.  PT will continue to see pt for treatment while awaiting approval for additional OT sessions.  Pt continues to progress toward his functional goals and independence with basic mobility tasks is improving from session to session. Pt's core strength and dynamic balance have improved in both seated and standing positions.  Will continue to see pt in an effort to maximize his independence with mobility in order to decrease caregiver burden within the home environment.      Yogn Is progressing well towards his goals.   Pt prognosis is Fair.     Pt will continue to benefit from skilled outpatient physical therapy to address the deficits listed in the problem list box on initial evaluation, provide pt/family education and to  maximize pt's level of independence in the home and community environment.     Pt's spiritual, cultural and educational needs considered and pt agreeable to plan of care and goals.    Anticipated barriers to physical therapy: decreased insight into deficits, length of time since CVA    Goals:     See initial evaluation        Plan     Will plan to continue to see pt for PT services in an effort to maximize pt's independence with basic mobility and reduce caregiver burden.      Cliff Hollis, PT, DPT

## 2024-09-20 ENCOUNTER — CLINICAL SUPPORT (OUTPATIENT)
Dept: REHABILITATION | Facility: HOSPITAL | Age: 44
End: 2024-09-20
Payer: OTHER GOVERNMENT

## 2024-09-20 DIAGNOSIS — I69.322 DYSARTHRIA AS LATE EFFECT OF CEREBELLAR CEREBROVASCULAR ACCIDENT (CVA): ICD-10-CM

## 2024-09-20 DIAGNOSIS — I69.320 APHASIA AS LATE EFFECT OF CEREBROVASCULAR ACCIDENT (CVA): ICD-10-CM

## 2024-09-20 DIAGNOSIS — I67.89 CEREBROVASCULAR DISEASE, ACUTE: Primary | ICD-10-CM

## 2024-09-20 PROCEDURE — 92507 TX SP LANG VOICE COMM INDIV: CPT

## 2024-09-20 NOTE — PROGRESS NOTES
Speech and Language Therapy   Outpatient Progress Note    Date:  9/20/2024     Name: Yong Stallings   MRN: [id number]   Therapy Diagnosis:   Encounter Diagnoses   Name Primary?    Cerebrovascular disease, acute Yes    Dysarthria as late effect of cerebellar cerebrovascular accident (CVA)     Aphasia as late effect of cerebrovascular accident (CVA)        Physician: Mayito Langston MD  Physician Orders: speech eval and treat  Medical Diagnosis: CVA    Visit #/Visits authorized: 6/15  Date of Evaluation:  07/23/24  Insurance Authorization Period: exp 03/15/2025  Plan of Care:      7/22/2024 to 10/22/2024     UNTIMED  Procedure Min.   {Speech- Language- Voice Therapy  50min / 1 unit           Total Untimed Units: 1  Charges Billed/# of units: 1  Time In:  12:35  Time Out:  1:25  Total Billable Time: 50 min / 1 unit     Precautions: Standard and Fall    Subjective:     Pt reports: Pt verbalized and demonstrated worry about his Dad's missing phone. Dad and SLP explained strategies for relaxing and reducing worry/anxiety (especially about things we can't control and that don't directly affect him)      Objective:     Short term goals:     Pt will complete automatic language tasks at mod level with >90% intelligibility and min cues. Pt completed the following automatic tasks (no written stim today):  1-10: 100% no cues  10-1: 100% no cues  ABC's: 25/26 with model, 23/26 with cues      Pt will verbalize name and personally relevant information via naming or wh-questions for basic communication with min phonemic cues.  Pt stated name x5/5+ independently.  Pt stated SLPs name with cues required 100% of the time     Pt will label personally relevant items with 75% acc min cues.     Pt will comprehend personally relevant information at short story length with 75% acc no cues.     Pt will complete articulatory kinematic drills with CV/VC x4 units (with max of 1 unit different) x5 reps with 90% acc no cues. Pt completed articulatory  kinematic drills with V/VC/CV with mirror x10 each with initial cue. CV/VC repetitions with 1 unit different with 67% acc no cues.   Long Term Goal: Pt will communicate basic wants and needs via any modality with trained caregivers.      Pt completed single lingual movement with mirror x5 each. Reduced coordination initially noted then Pt is able to complete motor movements.     Patient Education/Response:     Motor speech vs language concept reviewed,     Written Home Exercises Provided: alphabet practice with worksheet; lingual isolated movement with mirror   Exercises were reviewed and Yong was able to demonstrate them prior to the end of the session.  Yong demonstrated good  understanding of the education provided.         Assessment:   Yong is progressing well towards his goals.  Goals to be updated as needed. Improved automatic language tasks and stated name without cues consistently.     Pt prognosis is Fair. Pt will continue to benefit from skilled outpatient speech and language therapy to address the deficits listed in the problem list on initial evaluation, provide pt/family education and to maximize pt's level of independence in the home and community environment.     KT NOMS (Functional Communication Measures):  KT NOMS: KANDI 24 CURRENT 24   Attention: 4 4   Memory: 4 5   Motor Speech: 3 4   Readin 4   Spoken Language Comprehension: 4 4   Spoken Language Expression: 2 3   Swallowin 7      Barriers to Therapy: prolonged period since CVA  Pt's spiritual, cultural and educational needs considered and pt agreeable to plan of care and goals.    Plan:   Continue POC with focus on functional communication of basic wants/needs.       Kate Olvera MA, CCC-SLP  2024

## 2024-09-25 ENCOUNTER — CLINICAL SUPPORT (OUTPATIENT)
Dept: REHABILITATION | Facility: HOSPITAL | Age: 44
End: 2024-09-25
Payer: OTHER GOVERNMENT

## 2024-09-25 DIAGNOSIS — I67.89 CEREBROVASCULAR DISEASE, ACUTE: Primary | ICD-10-CM

## 2024-09-25 DIAGNOSIS — I69.320 APHASIA AS LATE EFFECT OF CEREBROVASCULAR ACCIDENT (CVA): ICD-10-CM

## 2024-09-25 DIAGNOSIS — I69.322 DYSARTHRIA AS LATE EFFECT OF CEREBELLAR CEREBROVASCULAR ACCIDENT (CVA): ICD-10-CM

## 2024-09-25 PROCEDURE — 97530 THERAPEUTIC ACTIVITIES: CPT

## 2024-09-25 PROCEDURE — 92507 TX SP LANG VOICE COMM INDIV: CPT

## 2024-09-25 PROCEDURE — 97110 THERAPEUTIC EXERCISES: CPT

## 2024-09-25 NOTE — PROGRESS NOTES
"                         [facility]                              Daily Treatment Note          Name: Yong Stallings      Therapy Diagnosis:   Encounter Diagnosis   Name Primary?    Cerebrovascular disease, acute Yes     Physician: Mayito Langston MD    Visit Date: 9/25/2024        Time In: 1330  Time Out: 1430  Total Billable Time: 60 minutes    Precautions: Fall        Subjective     Pt reports: (per father) he had an unwitnessed seizure on Monday.  Pt was left on the toilet and was provided with the "bell" to ring when he needed assistance.  After an extended time, pt's father returned to the bathroom, having no heard the "bell" ring.  Pt was found on the floor laying on his R side.  Pt presents with visible scratches and swelling on the R side of his face.  Per SLP, pt has a large bite mitali on the inside of his mouth.  No other obvious injuries observed.  SLP expressed concerns about potential changes in pt's verbal ability as compared to previous treatments.       Pain: 0/10        Objective     Yong received therapeutic exercises to develop strength, endurance, ROM, flexibility, posture, and core stabilization for 45 minutes including:      Exercise Sets Reps Comments   BLE supine stretching/ROM 1 10 PT assisted pt with manual LE stretching and gentle PROM.  PT assisted with B knee flexion/extension, B hip flexion, B hip IR/ER, B adductor and HS stretching in preparation for mobility. Continued joint instability noted in the L knee with terminal extension.                   LTR 1 12 PT applied a gait belt around the thighs to bind pt's legs together.  Pt performed LTR x 10 without any assistance required.    Bridging 2 15 Pt performed 15 reps of bridging.  PT Stabilized pt's RLE.   B knee to chest 1 5 Attempted to perform knee-to-chest with BLE on a small swiss ball.  PT had to assist with this activity x 5 reps with B feet on the ball.  Pt's R foot was then removed and PT " "asked pt to perform this activity with only the LLE.  [place] improved slightly, but assistance was still required to initiate the movement and prevent excessive ER of this extremity.     LLE supine ex 2 10 Heel slides and hip abduction were performed with the L foot placed on a sliding board to reduce friction.  Assistance required to initiate each activity, but this improved with increased reps.    Resisted hip abduction and adduction performed with pt in a hooklying position.   Perturbations were applied at pt's L knee while in a hooklying position. Pt was asked to resist movement of this limb.  This activity was performed x 15 reps.           Yong participated in dynamic functional therapeutic activities to improve functional performance for 15  minutes, including:       Assist Level Assistive Device Comments    Bed Mobility ModA-maxA  Pt mobilized from sitting at the edge of the mat into supine.  Pt was assisted with crossing his feet prior to descending onto the mat. Pt laid to his L side and experienced increased difficulty controlling the descent of his trunk .  PT provided assistance with lower extremities . A gait belt was looped around pt's R thigh to serve as a "leg " when returning to sitting.  Pt was able to mobilize B legs off the edge of the mat, but assistance was required with the RLE.  Pt attempted to orient his body in order to best utilize the railing attached to the wall. Pt was unable to do so and was provided a hand by PT. Increased assistance was required to reach upright sitting due to a strong posterior lean.   Sit to stand/Stand to sit MaxA  One sit to stand/stand to sit transfer was performed with PT assisting pt anteriorly. Pt stood from the low height of the mat and was then pivoted toward his WC.  MaxA was required to complete this transitional movement.  Fatigue at the conclusion of the session may have impacted his performance with this transfer.    Balance Training SBA  " Pt performed dynamic sitting balance at the edge of the mat while PT provided perturbations at pt's shoulders and trunk.  Pt attempted to utilize his L hand for stability upon several occasions.  Pt performed this activity x 20 perturbations.      Pt also reached for a 2# handweight which was placed on the ground between his feet.  Pt retrieved the weight and then performed a cross body overhead reach to hand the weight to PT positioned to his R.  Pt performed this activity x 10 trials.  Increased difficulty observed with returning to upright sitting.  Facilitory tapping provided to pt's back musculature.  Pt relied heavily on placing his L elbow on his thigh to return to upright sitting.          Stair Climbing      Car Transfer      Other:  ModA  Lateral sliding board t/f performed from the WC to the edge of the low mat.  PT assisted pt to position his WC at the edge of the mat and pt was asked to complete the remaining steps.  PT forgot to remove his L leg rest prior to setting up the sliding board.  Cues provided.  PT assisted pt to place the board under his L thigh.  Increased difficulty noted with initiation of the scoot onto the board.  Cues required for hand placement as well.  Once the transition was completed toward the mat, pt demonstrated a significant L sided and posterior lean and required assistance from PT to maintain upright sitting.          Assessment     Pt tolerated session fairly, but his affect appeared more flat and he was less vocally engaged during treatment.  Pt required increased assistance with functional mobility.  Focus was placed on isolated exercises to target strengthening of the uninvolved lower extremity.  Pt participated fairly in all activities, but did appear more fatigued than usual.  Per SLP, pt is declining seeing his physician following the events which occurred on Monday.  PT suggested to pt's father that PT will reassess pt's performance next week following a weekend of  rest.  Encouragement was provided for pt to perform AROM/AAROM of the LLE as often as tolerated at home.  This was discussed in front of pt's father.  PT reiterated to pt that he needs to allow his father to assist with basic exercises.      Yong Is progressing well towards his goals.   Pt prognosis is Fair.     Pt will continue to benefit from skilled outpatient physical therapy to address the deficits listed in the problem list box on initial evaluation, provide pt/family education and to maximize pt's level of independence in the home and community environment.     Pt's spiritual, cultural and educational needs considered and pt agreeable to plan of care and goals.    Anticipated barriers to physical therapy: decreased insight into deficits, length of time since CVA    Goals:     See initial evaluation        Plan     Will plan to continue to see pt for PT services in an effort to maximize pt's independence with basic mobility and reduce caregiver burden.      Clfif Hollis, PT, DPT

## 2024-09-25 NOTE — PROGRESS NOTES
Speech and Language Therapy   Outpatient Progress Note    Date:  9/25/2024     Name: Yong Stallings   MRN: [id number]   Therapy Diagnosis:   Encounter Diagnoses   Name Primary?    Cerebrovascular disease, acute Yes    Dysarthria as late effect of cerebellar cerebrovascular accident (CVA)     Aphasia as late effect of cerebrovascular accident (CVA)        Physician: Mayito Langston MD  Physician Orders: speech eval and treat  Medical Diagnosis: CVA    Visit #/Visits authorized: 7/15  Date of Evaluation:  07/23/24  Insurance Authorization Period: exp 03/15/2025  Plan of Care:      7/22/2024 to 10/22/2024     UNTIMED  Procedure Min.   {Speech- Language- Voice Therapy  45 min / 1 unit           Total Untimed Units: 1  Charges Billed/# of units: 1  Time In:  12:45  Time Out:  1:30  Total Billable Time: 45 min / 1 unit     Precautions: Standard and Fall    Subjective:     Pt reports: Pt and family reported possible seizure on Monday. Pt's father left the Pt on the toilet with his call bell and went to [facility], when father came back the Pt was on the floor. Family reports confusion after incident. Father reported initial concern for TIA, but he feels like it was likely a seizure. Pt demonstrated increased speech slurr and slowed speech with increased drooling, although swollen cheek noted with bite on buccal mucosa. Increased difficulty with specific speech and language tasks noted, see below. SLP informed PT of changes and family reports.        Objective:     Short term goals:     Pt will complete automatic language tasks at mod level with >90% intelligibility and min cues. Pt completed the following automatic tasks (no written stim today):  1-10: 100% cues required  10-1: 100% cues required  Visual stim and phonemic cues required today.   Pt will verbalize name and personally relevant information via naming or wh-questions for basic communication with min phonemic cues.  Pt stated name and Dad's name 3/3x independently.  Pt  stated SLPs name with cues required 100% of the time.     Pt will label personally relevant items with 75% acc min cues.  Pt labeled pictures of specific food items and restaurants relevant to Pt with 85% acc no cues required, increased initiation time.    Pt will comprehend personally relevant information at short story length with 75% acc no cues.     Pt will complete articulatory kinematic drills with CV/VC x4 units (with max of 1 unit different) x5 reps with 90% acc no cues. Pt completed articulatory kinematic drills with V/CV x1 each with max cues required - increased difficulty differentiating vowel sounds today, Pt unable to produce 3 vowels in isolation despite cues and minimal pair cues   Long Term Goal: Pt will communicate basic wants and needs via any modality with trained caregivers.      Pt completed single lingual movement with mirror x5-10 each. Reduced coordination throughout.     Patient Education/Response:     Educated on elements of difficulty and possible need for more rest due to incident on Monday.     Written Home Exercises Provided: alphabet practice with worksheet; lingual isolated movement with mirror   Exercises were reviewed and Yong was able to demonstrate them prior to the end of the session.  Yong demonstrated good  understanding of the education provided.         Assessment:   Yong is progressing well towards his goals.  Goals to be updated as needed. Improved automatic language tasks and stated name without cues consistently.     Pt prognosis is Fair. Pt will continue to benefit from skilled outpatient speech and language therapy to address the deficits listed in the problem list on initial evaluation, provide pt/family education and to maximize pt's level of independence in the home and community environment.     KT NOMS (Functional Communication Measures):  KT NOMS: KANDI 24 CURRENT 24   Attention: 4 4   Memory: 4 5   Motor Speech: 3 4   Readin 4   Spoken  Language Comprehension: 4 4   Spoken Language Expression: 2 3   Swallowin 7      Barriers to Therapy: prolonged period since CVA  Pt's spiritual, cultural and educational needs considered and pt agreeable to plan of care and goals.    Plan:   Continue POC with focus on functional communication of basic wants/needs.       Kate Olvera MA, CCC-SLP  2024

## 2024-10-02 ENCOUNTER — CLINICAL SUPPORT (OUTPATIENT)
Dept: REHABILITATION | Facility: HOSPITAL | Age: 44
End: 2024-10-02
Payer: OTHER GOVERNMENT

## 2024-10-02 DIAGNOSIS — I67.89 CEREBROVASCULAR DISEASE, ACUTE: Primary | ICD-10-CM

## 2024-10-02 PROCEDURE — 97530 THERAPEUTIC ACTIVITIES: CPT

## 2024-10-02 PROCEDURE — 97110 THERAPEUTIC EXERCISES: CPT

## 2024-10-02 NOTE — PROGRESS NOTES
"                         [facility]                              Daily Treatment Note          Name: Yong Stallings      Therapy Diagnosis:   Encounter Diagnosis   Name Primary?    Cerebrovascular disease, acute Yes     Physician: Mayito Langston MD    Visit Date: 10/2/2024        Time In: 1245  Time Out: 1345  Total Billable Time: 60 minutes    Precautions: Fall        Subjective     Pt reports: no new complaints. Pt reported that he was feeling "better" as compared to last week.       Pain: 0/10        Objective     Yong received therapeutic exercises to develop strength, endurance, ROM, flexibility, posture, and core stabilization for 15 minutes including:      Exercise Sets Reps Comments   BLE supine stretching/ROM 1 10 PT assisted pt with manual LE stretching and gentle PROM.  PT assisted with B knee flexion/extension, B hip flexion, B hip IR/ER, B adductor and HS stretching in preparation for mobility. Continued joint instability noted in the L knee with terminal extension.                   LTR 2 15 PT applied a gait belt around the thighs to bind pt's legs together.  Pt performed LTR, 2 x 15.    Bridging 2 15 With the legs bound, pt also performed 2 x 15 reps of bridging.  PT Stabilized pt's RLE.                     Yong participated in dynamic functional therapeutic activities to improve functional performance for 45  minutes, including:       Assist Level Assistive Device Comments    Bed Mobility ModA  Pt mobilized from sitting at the edge of the mat into supine.  Pt crossed his feet prior to descending onto the mat. Pt laid to his L side and was able to control the descent of his trunk using his LUE.  PT provided assistance with lower extremities . A gait belt was looped around pt's thighs to serve as a "leg " when returning to sitting.  Pt was able to mobilize B legs off the edge of the mat.  PT then provided a hand to pt to mobilize his trunk into upright.  " Increased assistance required to reach upright sitting due to a lack of trunk flexion and a posterior lean.   Sit to stand/Stand to sit Lola  10 trials of standing performed from the mat.  After the first rep, PT placed a foam pad under pt for a slightly elevated sitting surface. Pt utilized the railing to his L for assistance.  During the first 5 trials, pt stood for a prolonged time to perform balance training.  Pt first performed 10 reps of R and L lateral weight shifting.  This was followed by L foot heel lifts x 10, then 10 reps of removing the L foot from the floor in a small march, then 5 reps of removing his L hand from the railing, and lastly 5 mini-squats.  PT had to assist with weight shifting onto the RLE and blocking the R knee to prevent buckling during these dynamic activities.  The final 5 reps of standing were performed consecutively.  Much improved descent into sitting observed with cues provided for increased anterior leaning.      Balance Training SB-Lola  Pt performed dynamic sitting balance at the edge of the mat.  Pt reached to the floor to retrieve a 2# dumbbell.  Pt demonstrated some hesitancy to reach all the way to the ground, but this improved with increased reps.  PT had to provide tactile cueing and some minimal assistance for pt to return to full upright sitting.  Once the weight was retrieved, pt reached across his body toward the upper R quadrant to hand the weight to PT.  This activity was performed x 7 reps in preparation for standing.     Wheelchair Mobility Lola  Pt able to position his WC at the edge of the mat in order to prepare for transfers. PT observed pt's sequencing and provided minimal cues as needed.     Stair Climbing      Car Transfer      Other:  Lola-modA  Lateral sliding board transfers performed from the WC to the edge of the mat and from the edge of the mat back to the WC.  As documented above, minimal cues and assistance were provided in order to prepare and  position the WC for the transitional movements.  Pt was able to place the board when transitioning toward the mat, but required assistance for board placement when transitioning back to the WC.  Toward the mat, pt's downhill transition occurred rapidly and in a slightly uncontrolled manner.  Pt was able to regain balance with Lola provided by PT.  Upon returning to the WC, pt allowed his buttocks to slide posteriorly off the board and his feet began to dangle off the floor.  PT intervened and assisted pt back onto the board the modA.  Pt appeared to have reduced proprioceptive awareness today and demonstrated very flexed posture.  Pt did not realize that he had transitioned all the way in the WC and was still attempting to push with his LUE.  PT provided cues for pt to sit up and look in the mirror. Pt then realized his positioning in the chair and was able to remove the sliding board independently.          Assessment     Pt tolerated session fairly well and appeared to be in better spirits as compared to previous session.  As documented above, pt appeared to demonstrate reduced proprioceptive awareness and therefore required increased assistance with transitional movements from WC>mat/mat>WC.   Will plan to continue with PT intervention in an effort to maximize pt's independence with functional mobility and reduce caregiver burden within the home environment and community.  PT is authorized for 3 additional visits.  Will plan to perform a reassessment next week and request additional visits for continued care.     Yong Is progressing well towards his goals.   Pt prognosis is Fair.     Pt will continue to benefit from skilled outpatient physical therapy to address the deficits listed in the problem list box on initial evaluation, provide pt/family education and to maximize pt's level of independence in the home and community environment.     Pt's spiritual, cultural and educational needs considered and pt agreeable  to plan of care and goals.    Anticipated barriers to physical therapy: decreased insight into deficits, length of time since CVA    Goals:     See initial evaluation        Plan     Will plan to continue to see pt for PT services in an effort to maximize pt's independence with basic mobility and reduce caregiver burden.      Cliff Hollis, PT, DPT

## 2024-10-03 ENCOUNTER — CLINICAL SUPPORT (OUTPATIENT)
Dept: REHABILITATION | Facility: HOSPITAL | Age: 44
End: 2024-10-03
Payer: OTHER GOVERNMENT

## 2024-10-03 DIAGNOSIS — I67.89 CEREBROVASCULAR DISEASE, ACUTE: Primary | ICD-10-CM

## 2024-10-03 PROCEDURE — 97530 THERAPEUTIC ACTIVITIES: CPT

## 2024-10-03 PROCEDURE — 97110 THERAPEUTIC EXERCISES: CPT

## 2024-10-03 NOTE — PROGRESS NOTES
[facility]                              Daily Treatment Note          Name: Yong Stallings      Therapy Diagnosis:   Encounter Diagnosis   Name Primary?    Cerebrovascular disease, acute Yes     Physician: Mayito Langston MD    Visit Date: 10/3/2024        Time In: 1240  Time Out: 1340  Total Billable Time: 60 minutes    Precautions: Fall        Subjective     Pt reports: feeling well.  Pt appeared eager to participate in therapy.         Pain: 0/10        Objective     Yong received therapeutic exercises to develop strength, endurance, ROM, flexibility, posture, and core stabilization for 45 minutes including:      Exercise Sets Reps Comments   BLE supine stretching/ROM 1 10 PT assisted pt with manual LE stretching and gentle PROM.  PT assisted with B knee flexion/extension, B hip flexion, B hip IR/ER, B adductor and HS stretching in preparation for mobility. Continued joint instability noted in the L knee with terminal extension.                   LTR 2 15 PT applied a gait belt around the thighs to bind pt's legs together.  Pt performed LTR 2 x 15 without any assistance required.    Bridging 2 15 Pt performed 15 reps of bridging.  PT Stabilized pt's RLE.   LLE side-lying ex 2 10 LLE placed on the sliding board for pt to perform gravity-eliminated hip flexion.  PT provided minimal assistance for ROM.      Clam shells also performed while in side-lying         LLE supine ex 2 10 Heel slides and hip abduction were performed with the L foot placed on a sliding board to reduce friction.  Assistance required to initiate each activity, but this improved with increased reps.      SAQs performed with a bolster in place under the L knee.     A 5# weight was applied to pt's LLE in a hook-lying position.  Pt performed single leg LTR with the added weight.     Pt's LLE was placed off the mat and pt performed LAQs and marches in order to improve his independence with  mobilizing the L leg into the bed during bed mobility.      PT assisted pt's LLE into a table top position.  Pt was then asked to lower his L foot to the mat for eccentric control.           Yong participated in dynamic functional therapeutic activities to improve functional performance for 15  minutes, including:       Assist Level Assistive Device Comments    Bed Mobility ModA-maxA  Pt mobilized from sitting at the edge of the mat into supine.  Pt was able to cross his feet prior to descending onto the mat. Pt laid toward his L side and controlled his descent into supine.  PT provided assistance with the RLE, but pt was able to bring his LLE onto the mat with the assistance of his L hand.   Pt was able to mobilize his LLE off the edge of the mat, but assistance was required with the RLE. PT provided pt with a hand, but pt experienced difficulty pulling himself into upright sitting due to a posteriorly tilted pelvis.  Fatigue also likely impacted his performance.    Transfer training Hailey-modA  Lateral sliding board t/f performed from the WC to the edge of the low mat.  PT assisted pt to position his WC at the edge of the mat and pt was asked to complete the remaining steps.  PT assisted pt to place the board under his L thigh. Pt was able to initiate the scoot himself.  Once his started the downhill descent, pt allowed his trunk to lean anteriorly and descended in a rapid fashion.  Pt able to recover this LOB with cues provided.  Pt's knees were blocked and feet were readjusted for safety.  Upon returning to the WC, PT assisted pt with placing the board. Pt was unable to initiate the uphill transition.  PT assisted pt onto the board, but his hips were positioned too posteriorly, causing his feet to dangle.  PT again assisted pt back into a proper position on the sliding board. Pt completed the remainder of the transition with PT assisting only to adjust the feet.                            Other:              Assessment     Pt tolerated session fairly well and today's treatment focused primarily on strengthening of the uninvolved limb. Pt continues to present with diffuse weakness which is likely the result of disuse atrophy.  PT feels that if pt's LLE strength improves, he will develop increased independence with basic mobility tasks such as bed mobility and WC mobility.  This was discussed with pt who expressed agreement.  Re-assessment will be performed next week.        Yong Is progressing well towards his goals.   Pt prognosis is Fair.     Pt will continue to benefit from skilled outpatient physical therapy to address the deficits listed in the problem list box on initial evaluation, provide pt/family education and to maximize pt's level of independence in the home and community environment.     Pt's spiritual, cultural and educational needs considered and pt agreeable to plan of care and goals.    Anticipated barriers to physical therapy: decreased insight into deficits, length of time since CVA    Goals:     See initial evaluation        Plan     Will plan to continue to see pt for PT services in an effort to maximize pt's independence with basic mobility and reduce caregiver burden.      Cliff Hollis, PT, DPT

## 2024-10-09 ENCOUNTER — CLINICAL SUPPORT (OUTPATIENT)
Dept: REHABILITATION | Facility: HOSPITAL | Age: 44
End: 2024-10-09
Payer: OTHER GOVERNMENT

## 2024-10-09 DIAGNOSIS — I67.89 CEREBROVASCULAR DISEASE, ACUTE: Primary | ICD-10-CM

## 2024-10-09 DIAGNOSIS — I69.322 DYSARTHRIA AS LATE EFFECT OF CEREBELLAR CEREBROVASCULAR ACCIDENT (CVA): ICD-10-CM

## 2024-10-09 DIAGNOSIS — I69.320 APHASIA AS LATE EFFECT OF CEREBROVASCULAR ACCIDENT (CVA): ICD-10-CM

## 2024-10-09 PROCEDURE — 92507 TX SP LANG VOICE COMM INDIV: CPT

## 2024-10-09 PROCEDURE — 97530 THERAPEUTIC ACTIVITIES: CPT

## 2024-10-09 PROCEDURE — 97110 THERAPEUTIC EXERCISES: CPT

## 2024-10-09 NOTE — PROGRESS NOTES
Speech and Language Therapy   Outpatient Progress Note  REASSESSMENT    Date:  10/9/2024     Name: Yong Stallings   MRN: [id number]   Therapy Diagnosis:   Encounter Diagnoses   Name Primary?    Cerebrovascular disease, acute Yes    Dysarthria as late effect of cerebellar cerebrovascular accident (CVA)     Aphasia as late effect of cerebrovascular accident (CVA)        Physician: Mayito Langston MD  Physician Orders: speech eval and treat  Medical Diagnosis: CVA    Visit #/Visits authorized: 8/15  Date of Evaluation:  07/23/24  Insurance Authorization Period: exp 03/15/2025  Plan of Care:      7/22/2024 to 10/22/2024     UNTIMED  Procedure Min.   {Speech- Language- Voice Therapy  30 min / 1 unit           Total Untimed Units: 1  Charges Billed/# of units: 1  Time In:  13:00  Time Out:  13:30  Total Billable Time: 30 min / 1 unit     Precautions: Standard and Fall    Subjective:     Pt reports: Pt participated with redirection today. Perseveration on immigration issue with his GF. Increased frustration con't with difficult as session progresses. Good participation throughout despite need for redirection. Reduced higher level understanding of immigration rules/regs and times required for these things. Pt con't to blame his father and verbalize/gesture/communicate frustration with his father despite father's attempts at helping - illogical expectations noted.      Objective:     Orientation: O x4 with written multiple choice options; Pt unable to verbalize name today.  General Attention: reduced attention with increased complexity of stimuli    LANGUAGE:   Receptive Language:  1 Step Directions: 100%   2 Step Directions: 50%  Simple y/n Questions: 100%  Complex y/n Questions: 86%    Expressive Language:   Automatic Speech: 1-10 100% with cues; 10-1 100% with cues; ABCs 21/26, increase with phonemic cues  Repetition: single words 100%; phrases 50% acc no cues and 100% with phonemic cues for initial word only; 0% with  numbers  Phrase completion: 100%  Naming items: 30% acc no cues and 100% with phonemic cues  Item Function: 0% acc no cues and 100% with phonemic cues    COGNITION: Unable to assess due to language deficits.    MOTOR SPEECH:  AMRs: 100% acc   SMRs: unable to complete despite max cues      Short term goals:     Pt will complete automatic language tasks at mod level with >90% intelligibility and min cues.    Pt will verbalize name and personally relevant information via naming or wh-questions for basic communication with min phonemic cues.     Pt will label personally relevant items with 75% acc min cues.     Pt will comprehend personally relevant information at short story length with 75% acc no cues.     Pt will complete articulatory kinematic drills with CV/VC x4 units (with max of 1 unit different) x5 reps with 90% acc no cues.    Long Term Goal: Pt will communicate basic wants and needs via any modality with trained caregivers.        Patient Education/Response:     Educated on Pt con't to have difficulty without phonemic cues. Need for completion of homework reviewed as Pt reportedly gives his father trouble with homework.    Written Home Exercises Provided: alphabet practice with worksheet; SMR drills   Exercises were reviewed and Yong was able to demonstrate them prior to the end of the session.  Yong demonstrated good  understanding of the education provided.         Assessment:   Yong is showing minimal progress towards his goals.  Goals to be updated as needed. Increased difficulty with expressive language noted over last few sessions.     Pt prognosis is Fair. Pt will continue to benefit from skilled outpatient speech and language therapy to address the deficits listed in the problem list on initial evaluation, provide pt/family education and to maximize pt's level of independence in the home and community environment.     KT NOMS (Functional Communication Measures):  KT NOMS: KANDI 07/22/24 08/26/24  CURRENT   Attention: 4 4 4   Memory: 4 5 5   Motor Speech: 3 4 3-4   Readin 4 4   Spoken Language Comprehension: 4 4 4   Spoken Language Expression: 2 3 2-3   Swallowin 7 7        Barriers to Therapy: prolonged period since CVA; unrealistic expectations of recovery despite education   Pt's spiritual, cultural and educational needs considered and pt agreeable to plan of care and goals.    Plan:   Continue POC with focus on functional communication of basic wants/needs.       Kate Olvera MA, CCC-SLP  10/09/2024                                                        Yes

## 2024-10-09 NOTE — PROGRESS NOTES
[facility]                              Daily Treatment Note          Name: Yong Stallings      Therapy Diagnosis:   Encounter Diagnosis   Name Primary?    Cerebrovascular disease, acute Yes     Physician: Mayito Langston MD    Visit Date: 10/9/2024        Time In: 1340  Time Out: 1430  Total Billable Time: 50 minutes    Precautions: Fall        Subjective     Pt reports: no new complaints. Pt had transitioned onto the low mat with OT prior to PT's arrival.       Pain: 0/10        Objective     Yong received therapeutic exercises to develop strength, endurance, ROM, flexibility, posture, and core stabilization for 13 minutes including:      Exercise Sets Reps Comments   BLE supine stretching/ROM 1 10 PT assisted pt with manual LE stretching and gentle PROM.  PT assisted with B knee flexion/extension, B hip flexion, B hip IR/ER, B adductor and HS stretching in preparation for mobility. Continued joint instability noted in the L knee with terminal extension.                            Yong participated in dynamic functional therapeutic activities to improve functional performance for 37  minutes, including:       Assist Level Assistive Device Comments    Bed Mobility ModA  Pt mobilized into sitting at the edge of the mat from a supine position.  Pt was able to mobilize his LLE off the mat on his own.  Very slight assistance provided with the RLE. PT provided pt with a hand to mobilize his trunk into upright sitting.  Pt demonstrated improved trunk control and much less of a posterior lean.  Pt able to obtain his center of mass much  more readily as compared to previous sessions.     Sit to stand/Stand to sit Hailey  Pt performed 1 trial of standing with OT assisting anteriorly. Pt wrapped his LUE around OT's shoulders.  OT blocked pt's R knee to prevent buckling.  PT made adjustments to pt's sitting surface while pt stood briefly with OT.    Four trials of sit  to stand/stand to sit transitions were performed from the edge of the mat.  Pt sat on ONE blue foam pad to raise his sitting surface.  Pt utilized the railing positioned to his L to assist himself into standing.  OT was positioned anteriorly but provided pt with less assistance during both ascent and descent.  While standing, pt performed R and L lateral weight shifting, 2 x 10 reps.  Following this, pt performed targeted stepping, 2 x 5 reps.  OT blocked pt's R knee to prevent buckling and pt was instructed to weight shift toward his R and step with his LLE.  PT provided facilitory assistance during the first 3 reps, but pt was able to more independently advance the limb as activity progressed.     Balance Training SBA-Lola  Pt performed dynamic sitting balance at the edge of the mat.  Pt was positioned on a balance disc placed on a 2 inch step.  Pt performed R and L lateral weight shifting, following by anterior and posterior pelvic tilting x 10 reps.  Pt then performed 3 sets of 10 LUE multi-directional reaches.  OT was positioned anteriorly, stabilizing pt's legs for the first 2 sets.  During the final set, OT provided no stability at pt's legs, further challenging his maintenance of dynamic balance.    Wheelchair Mobility Lola  Pt performed WC mobility x 15 feet.  Pt was able to better utilize his LLE with improved hip flexion, knee extension, and HS activation.  PT provided Lola for steering.     Stair Climbing      Car Transfer      Other:  Lola-modA  Lateral sliding board t/f performed from the low mat mat back to the .  OT assisted pt to place the board and set up the WC, since pt was transitioning toward his R side. Pt was able to initiate the slide with OT stabilizing his RLE to avoid slippage.  Approximately 50% of the way through the transition, pt began to slide anteriorly on the board, and PT/OT intervened for safety.  The board was stabilized and pt was able to complete the transition with cueing  for hand placement. Pt again demonstrated forward flexion at the waist and required cueing for upright sitting posture.  Pt's performance with this t/f was likely impacted by increased fatigue at the conclusion of treatment.            Assessment     Pt tolerated session well and was motivated to participate in all activities.  Co-treatment performed with OT due to the need for two sets of skilled hands for safety with more complex activities/tasks.  PT has one more approved visit; therefore, a reassessment will be performed during our next session.  PT plans to request additional visits in order to continue with PT intervention in an effort to maximize pt's safety and independence with basic mobility tasks to reduce caregiver burden at home. Pt has so far demonstrated improvements in his trunk strength, maintenance of dynamic balance, and independence in his functional transfers.  PT feels that pt has the potential for further improvement and increased utilization of his uninvolved lower extremity.      Yong Is progressing well towards his goals.   Pt prognosis is Fair.     Pt will continue to benefit from skilled outpatient physical therapy to address the deficits listed in the problem list box on initial evaluation, provide pt/family education and to maximize pt's level of independence in the home and community environment.     Pt's spiritual, cultural and educational needs considered and pt agreeable to plan of care and goals.    Anticipated barriers to physical therapy: decreased insight into deficits, length of time since CVA    Goals:     See initial evaluation        Plan     Will plan to continue to see pt for PT services in coordination with OT in an effort to maximize pt's independence with basic mobility and reduce caregiver burden.      Cliff Hollis, PT, DPT

## 2024-10-10 ENCOUNTER — CLINICAL SUPPORT (OUTPATIENT)
Dept: REHABILITATION | Facility: HOSPITAL | Age: 44
End: 2024-10-10
Payer: OTHER GOVERNMENT

## 2024-10-10 DIAGNOSIS — I67.89 CEREBROVASCULAR DISEASE, ACUTE: Primary | ICD-10-CM

## 2024-10-10 PROCEDURE — 97164 PT RE-EVAL EST PLAN CARE: CPT

## 2024-10-10 PROCEDURE — 97530 THERAPEUTIC ACTIVITIES: CPT

## 2024-10-11 ENCOUNTER — CLINICAL SUPPORT (OUTPATIENT)
Dept: REHABILITATION | Facility: HOSPITAL | Age: 44
End: 2024-10-11
Payer: OTHER GOVERNMENT

## 2024-10-11 DIAGNOSIS — I69.320 APHASIA AS LATE EFFECT OF CEREBROVASCULAR ACCIDENT (CVA): ICD-10-CM

## 2024-10-11 DIAGNOSIS — I67.89 CEREBROVASCULAR DISEASE, ACUTE: Primary | ICD-10-CM

## 2024-10-11 DIAGNOSIS — I69.322 DYSARTHRIA AS LATE EFFECT OF CEREBELLAR CEREBROVASCULAR ACCIDENT (CVA): ICD-10-CM

## 2024-10-11 PROCEDURE — 92507 TX SP LANG VOICE COMM INDIV: CPT

## 2024-10-11 NOTE — PROGRESS NOTES
Speech and Language Therapy   Outpatient Progress Note      Date:  10/11/2024     Name: Yong Stallings   MRN: [id number]   Therapy Diagnosis:   Encounter Diagnoses   Name Primary?    Cerebrovascular disease, acute Yes    Dysarthria as late effect of cerebellar cerebrovascular accident (CVA)     Aphasia as late effect of cerebrovascular accident (CVA)        Physician: Mayito Langston MD  Physician Orders: speech eval and treat  Medical Diagnosis: CVA    Visit #/Visits authorized: 9/15  Date of Evaluation:  07/23/24  Insurance Authorization Period: exp 03/15/2025  Plan of Care:      7/22/2024 to 10/22/2024     UNTIMED  Procedure Min.   {Speech- Language- Voice Therapy  45 min / 1 unit           Total Untimed Units: 1  Charges Billed/# of units: 1  Time In:  12:50  Time Out:  13:35  Total Billable Time: 45 min / 1 unit     Precautions: Standard and Fall    Subjective:     Pt reports: Pt participated well and in was in good spirits initially. Pt became frustrated and demonstrated sadness with difficulty as session progressed. Difficult but empathetic conversation between SLP and Pt (and later including Pt's caregivers) at end of session re: Pt need for realistic goals and need to make plans to live life with disability, rather than waiting for all disabilities to be 100% cured.     Objective:       Short term goals:     Pt will complete automatic language tasks at mod level with >90% intelligibility and min cues. Pt completed automatic language tasks:  1-10: 100% acc 5/5   10-1: 100% acc 3/5  ABCs: 21-25/26     Pt able to complete all tasks with phonemic cues.    Pt will verbalize name and personally relevant information via naming or wh-questions for basic communication with min phonemic cues.  Pt verbalized the following in response to pictures and/or questions:  Name: independently 3/4   Food: 20% acc, increase to 100% with phonemic cues     Pt will label personally relevant items with 75% acc min cues.  Pt labeled  personally relevant household items with 87% acc with phonemic cues and/or semantic cues. Unable to repeat at times.    Pt will comprehend personally relevant information at short story length with 75% acc no cues.     Pt will complete articulatory kinematic drills with CV/VC x4 units (with max of 1 unit different) x5 reps with 90% acc no cues. Completed CV combinations with <25% acc with max cues. Increased difficulty with minimal pairs.   Long Term Goal: Pt will communicate basic wants and needs via any modality with trained caregivers.        Patient Education/Response:     Educated on Pt con't to have difficulty without phonemic cues. Need for completion of homework reviewed as Pt reportedly gives his father trouble with homework.    Written Home Exercises Provided: alphabet practice with worksheet; SMR drills   Exercises were reviewed and Yong was able to demonstrate them prior to the end of the session.  Yong demonstrated good  understanding of the education provided.         Assessment:   Yong is showing minimal progress towards his goals.  Goals to be updated as needed. Increased difficulty with repetition and basic motor speech.    Pt prognosis is Fair. Pt will continue to benefit from skilled outpatient speech and language therapy to address the deficits listed in the problem list on initial evaluation, provide pt/family education and to maximize pt's level of independence in the home and community environment.     KT NOMS (Functional Communication Measures):  KT NOMS: KANDI 24 CURRENT   Attention: 4 4 4   Memory: 4 5 5   Motor Speech: 3 4 3-4   Readin 4 4   Spoken Language Comprehension: 4 4 4   Spoken Language Expression: 2 3 2-3   Swallowin 7 7      Barriers to Therapy: prolonged period since CVA; unrealistic expectations of recovery despite education   Pt's spiritual, cultural and educational needs considered and pt agreeable to plan of care and goals.    Plan:   Continue POC  with focus on functional communication of basic wants/needs.       Kate Olvera MA, CCC-SLP  10/11/2024

## 2024-10-11 NOTE — PROGRESS NOTES
"[facility]   Physical Therapy Re-Evaluation           Name: Yong Stallings        Therapy Diagnosis:        Encounter Diagnosis   Name Primary?    Other cerebrovascular disease Yes      Physician: Mayito Langston MD     Physician Orders: PT Eval and Treat  Medical Diagnosis from Referral: h/o CVA  Evaluation Date: 7/24/2024    Re-Evaluation Date:  10/10/2024     Visit # / Visits authorized: 15/15     Time In: 1140  Time Out: 1240  Total Billable Time: 60 minutes     Precautions: Fall           Subjective      Date of onset: CVA occurred 3 years ago     History of current condition (from initial eval) - History obtained from pt's father who was present throughout assessment. Pt reportedly suffered a CVA while living in the [facility] 3 years ago.  Pt just recently returned to the [facility] in Feb of 2024.  Pt did receive therapy services in the [facility], but pt's father was unable to provide specifics.  Since his return to the , pt has been residing with his father who serves as his primary caregiver. Pt also has a sitter who is present M-F from 9-5:30.  Pt and father reside in a single story home without steps to enter. Pt has a manual WC in which he presented to therapy today. PT noted that the WC does not have removable armrests or foot rests. Father later reported that the foot rests were in the car.  Pt also has a transport WC which he utilizes in the bathroom due to the narrow doorways. [facility] power [facility] which pt reportedly "refuses to use".  Pt's father showed PT a picture of the chair.  The power WC has a L joystick and a forearm trough on the R.  Pt also has a "sliding tub bench" and a gait belt at home. Pt is assisted with all mobility.  Sitter and father dependently transfer pt to and from his WC.  Pt sleeps in a bed with an elevating function.  Bed railings are available, but father reports that pt refused to allow them to be attached to the bed.  In terms " "of ADLs, pt is assisted with bathing, dressing, and toilet transfers. Pt does assist with bathing and dressing as able, and performs his own hygiene for toileting.  Prior to his referral to outpatient therapy, pt was receiving  services.  Father reports that pt was working on standing while holding onto the kitchen countertop.  Pt does present with aphasia and some limited communication.  Pt is limitedly verbal but his very expressive via gestures and facial expressions. Pt did appear to understand more complex conversation, but command following was limited to single step. Pt is currently receiving outpatient speech therapy services and will be evaluated by OT later this week.              Prior Therapy:  services  Social History:  lives with this father  Occupation: previously in the [employer]        Pain:  Current 0/10        Pts goals: "to walk again".             Objective        Gait Analysis:  Pt is non-ambulatory        Functional Mobility:       Assist Level Assistive Device Comments    Bed Mobility SBA-modA   Pt able to transition from sitting at the edge of the mat to supine with SBA only. Pt crossed his legs to allow the LLE to assist the RLE. This was the first time that pt performed bed mobility without requiring physical assistance from PT/OT.  Upon returning to sitting at the edge of the mat, pt was provided with a gait belt looped around his R foot to serve as a leg .  Pt was able to mobilize BLE off the edge of the mat without assistance.  OT provided pt with a hand to pull himself into sitting. ModA required to reach full upright sitting at the edge of the mat. PT provided facilitory tapping at the abdominals for increased core engagement.    Sit to stand/Stand to sit Lola-modA   Sit to stand/stand to sit transitions performed x 3 trials from the low mat.  Pt was positioned on a single blue foam mat to elevate the surface slightly. Pt typically requires Lola to rise from this surface.  " Today, pt required slightly more assistance due to increased LE fatigue following the nu-step at the initiation of treatment. Once upright, pt requires only Lola to maintain static standing balance with much improved posture.  If dynamic activities are performed, R knee has to be blocked to prevent buckling.  While standing, pt performed 10-15 reps of R and L lateral weight shifting.  Less support was provided at the R knee, therefore, some instability in stance was obserfved.  Attempted targeting stepping with the LLE, but again, his performance was limited due to increased fatigue.  When stepping with the LLE, R knee must be blocked.     Transfers Lola   Lateral sliding board t/f performed from the WC to the edge of the mat and from the mat back to the WC. Pt is able to independently place the sliding board when transferring towards the mat.  Assistance was provided only to stabilize and readjust the RLE.  Pt performed the lateral transition with improved stability and control.  Upon transitioning back to the WC, pt was assisted with placing the board on his R side.  Pt utilized the wall railing to assist with initiation of scooting onto the board.  Improved control also noted with this uphill transition.  Good upright sitting posture noted.  No cueing required for L sided hand placement.  Overall, this best best overall performance with lateral transitions.     Gait         Balance Training         Wheelchair Mobility       Stair Climbing         Car Transfer         Other:  ModA   Stand pivot transfers performed from the WC to the nu-step and from the nu-step back to the WC.  Pt pedaled the nu-step using his LUE and BLE initially.  A theraband was wrapped around pt's thighs to prevent ER of the RLE and assist with maintaining the R foot on the step.  After approximately 1 minute and 30 seconds, pt pedaled with only BLE for one minute.  PT had to assist with initiation of hip and knee extension on the R.  After a  brief rest, pt pedaled with BLE for another minute and a half. During this final minute, pt was able to pedal the bike without PT's assistance.                    Assessment      Yong is a 44 y.o. male referred to outpatient Physical Therapy with a medical diagnosis of a previous CVA.  Pt has participated in 15 visits of PT thus far and has demonstrated progress with all aspects of functional mobility.  Pt has progressed toward each of his short term and long term goals.  Goals updated to reflect lateral transfers which have been consistently performed during PT treatments.  Pt does continue to present with weakness in his uninvolved LE which would benefit from continued attention.  This weakness is likely present from disuse atrophy as pt has remained very dependent on his caregiver and father since returning to the [place] earlier this year.  PT feels that with continued intervention, pt can continue to gain some functional independence within the home environment.  PT does worry that pt's performance in therapy is not yet carrying over into the home setting. PT feels that pt is continuing to allow his father and caregiver to provide more assistance than is required.  With additional visits, PT and OT can eventually target more family/caregiver training in an effort to facilitate increased carry over from therapy to home. PT does note that pt continues to remain fixated on return of function in his RUE.  PT and OT again discussed the goals of therapy with pt, but he appeared to become more withdrawn and slightly frustrated.  PT is requesting additional visits in order to continue seeing pt 2 times per week for 8 additional weeks.     Pt prognosis is Fair.   Pt will benefit from skilled outpatient Physical Therapy to address the deficits stated above and in the chart below, provide pt/family education, and to maximize pt's level of independence.      Plan of care discussed with patient: Yes  Pt's spiritual, cultural and  educational needs considered and pt agreeable to plan of care and goals as stated below:      Anticipated Barriers for therapy: duration since CVA, questionable expectations from pt              Goals:     Short Term Goals (4 Weeks):      1)  Pt will perform bed mobility (sitting to supine, supine to sitting) with Lola.  2)  Pt will perform sit to stand/stand to sit transitions with Lola.   3)  Pt will perform lateral transfers to and from his WC with modA.  4)  Pt will self-propel his manual  ft with SBA using his LUE and LLE.          Long Term Goals (7 Weeks):      1)  Pt will perform bed mobility (sitting to supine, supine to sitting) with supervision.  2)  Pt will perform sit to stand/stand to sit transitions with CGA.  3)  Pt will perform lateral transfers to and from his WC with Lola.   4)  Pt will self-propel his manual  ft with modified independence using his LUE and LLE.               Plan      Plan of care Certification: TBD     Outpatient Physical Therapy 2 times weekly for 8 weeks to include the following interventions: Patient Education, Therapeutic Activities, and Therapeutic Exercise.      Cliff Hollis, PT, DPT           I CERTIFY THE NEED FOR THESE SERVICES FURNISHED UNDER THIS PLAN OF TREATMENT AND WHILE UNDER MY CARE     Physician Name: _______________________________     Physician Signature: ____________________________

## 2024-10-11 NOTE — PROGRESS NOTES
Occupational Therapy    Occupational Therapy Outpatient Progress Note      Date: 10/9/2024  Name: Yong Stallings  Clinic Number: [id number]  : 23  Visit Number: 16      Subjective     Pt in good spirits and motivated for session. Pt continues to demonstrate expressive aphasia/dysarthria, noted decrease in spontaneous verbalization since last seizure.   Yong without pain reports today.      Patient's response to therapy: Yong tolerated session fair with good effort.      Objective     Current condition: Yong demonstrates severe right hemiplegia effecting functional transfers, ADLs, visual perception, and overall quality of life secondary to a CVA.      TREATMENT  Principle of treatment/treatment today: Transfer training, core strengthening, neuromotor re-ed, and dynamic sitting/standing balance training.     Therapeutic Activity:  Cotx performed with PT secondary to number of skilled hands required to perform higher level activities. Began with transfer training wc to mat with sliding board.  Pt able to position wc and prepare for transfer SBA with one verbal cue.  Pt able to place board under left side with min assist.  Pt able to perform transfer with min assist to begin then CGA remainder to left while adjusting feet.  Transfer training sit to supine to right side.  Pt able to cross bilat ankles SBA then transferred sit to supine min assist to raise BLE's.  In supine PT performed progressive stretches to trunk and LEs.  OT performed right scapular mobilization followed by anterior capsular stretches.  Performed slow progressive stretch to elbow, wrist, and digit flexors to neutral position.  Transfer training supine to sit.  Pt able to slide LLE off mat then min assist to slide RLE, min assist required with upper trunk to roll onto LUE and complete transition into sitting.  Transfer training sit to stand Mod assist blocking right knee and forward weight shift.  Wobble disc placed under pt in sitting.  Worked on  core strength and balance with lateral weight shifts and anterior/posterior pelvic tilts 10 reps ea.  Noted definite increase in balance and core strength, was able to perform with minimal corrections.  Bed mobility scooting 3 feet to left min assist to adjust feet.  Transfer training performed sit to stands from elevated seat using rail with LUE.  Pt performed 3 reps, with min/mod assist and verbal cuing to sequence mechanics and maintain forward trunk flexion.  While in standing worked on weight shifting to unload LE followed by stepping forward and back 6 reps with mod assist to block left knee in extension and maintain pelvic position.  Transfer training performed mat <> wc with sliding board min assist to right after setup.     Time In: 1330  Time Out: 1430  Total Appointment Time (timed & untimed codes): 60 minutes   Treatment time: Functional activities 30 minutes  Neuro re-education   minutes    Assessment     Summary/Analysis of session: Pt seen in clinic setting.  Pt early for session and well groomed. Cotx performed with PT secondary to number of skilled hands required to perform higher level activities.  Noted pt continues to progress with sliding board transfer, nearly CGA to left.  Noting increase in core strength with decreased LOB with seated activities.  Severe right hemiplegia with apraxia continues to hamper overall functional abilities.  Recommend continuing PLC to address functional deficits.    Progress toward previous goals: Continue STG/LTG   Goals:      LTG: (8 Weeks)   Pt will increase toilet transfers to min assist. (Progressing)  Pt with be able to don UE garments with min assist and LE garments with mod assist. (Progressing)  Pt will increase RUE function to be able to transfer supine to sit CGA from right side. (Progressing)     STG: (4 Weeks)   Pt will increase toilet transfers to mod assist stand pivot. (Progressing)  Pt will increase UE dressing to CGA and LE dressing to mod assist.  (Progressing)  Pt will increase RUE function to be able to transfer supine to sit with min assist from right side. (Progressing)    Plan   Next session to focus on core strength/mobility, dynamic sitting/standing bal training, ADL training, and reassessment.    Follow Up  Follow up in: 1 days

## 2024-10-11 NOTE — PROGRESS NOTES
Occupational Therapy  Occupational Therapy Outpatient Progress Note      Date: 10/10/2024  Name: Yong Stallings  Clinic Number: [id number]  : 23  Visit Number: 17      Subjective     Pt in good spirits and motivated for session.  Pt continues to demonstrate expressive aphasia/dysarthria.  Yong without pain reports today.      Patient's response to therapy: Yong tolerated session fair with good effort.      Objective     Current condition: Yong demonstrates severe right hemiplegia effecting functional transfers, ADLs, visual perception, and overall quality of life secondary to a CVA.      TREATMENT  Principle of treatment/treatment today: Transfer training, core strengthening, neuromotor re-ed, and dynamic sitting/standing balance training.     Therapeutic Activity:  Cotx performed with PT secondary to number of skilled hands required to perform higher level activities. Began today with transfer wc<> Nustep min assist left mod to right stand pivot.  Pt performed Nustep with BLE/LUE 3 mins then BLE's 2 min, 1 min. Transfer training wc to mat with sliding board.  Pt able to position wc and prepare for transfer SBA with one verbal cue to properly sequence today.  Pt able to place board with min assist.  Pt able to perform transfer with min assist to adjusting feet.  Transfer training sit to supine to right side.  Pt able to cross bilat ankles with one tactile assist then transferred sit to supine min assist to raise BLE.  Pt nearly able to raise BLE's without assist today.  In supine PT performed progressive stretches to trunk and LEs.  OT performed right scapular mobilization followed by anterior capsular stretches.  Performed slow progressive stretch to elbow, wrist, and digit flexors to neutral position.  Transfer training supine to sit.  Pt able to slide LLE off mat then gait belt looped around right foot as a leg , pt able to then slide RLE off mat SBA.  Mod assist required to rotate upper trunk onto LLE  then min assist to push up into midline.  Performed core strengthening with lateral weight shift's onto elbows l/r then mini situps 10 reps with min assist pulling up with BUEs  Bed mobility scooting 3 feet to left min assist to adjust feet.  Transfer training performed sit to stands from elevated seat using rail with LUE.  Pt performed 3 reps, with mod assist and verbal cuing to sequence mechanics and maintain forward trunk flexion.  While in standing worked on weight shifting to unload LE followed by 3 attempts to raise LE off floor to step forward.  Pt very fatigued from session and was unable to perform.  Transfer training performed mat <> wc with sliding board min assist to right after setup.     Time In: 1140  Time Out: 1235  Total Appointment Time (timed & untimed codes): 55 minutes   Treatment time: Functional activities 30 minutes  Neuro re-education   minutes    Assessment     Summary/Analysis of session: Pt seen in clinic setting.  Pt early for session and well groomed. Cotx performed with PT secondary to number of skilled hands required to perform higher level activities.  Noted pt continues to progress with sliding board transfer, nearly CGA to left.  Noting increase in stand pivot transfer min assist to left.  Severe right hemiplegia with apraxia continues to hamper overall functional abilities.  Recommend continuing PLC to address functional deficits.    Progress toward previous goals: Continue STG/LTG   Goals:      LTG: (8 Weeks)   Pt will increase toilet transfers to min assist. (Progressing)  Pt with be able to don UE garments with min assist and LE garments with mod assist. (Progressing)  Pt will increase RUE function to be able to transfer supine to sit CGA from right side. (Progressing)     STG: (4 Weeks)   Pt will increase toilet transfers to mod assist stand pivot. (Progressing)  Pt will increase UE dressing to CGA and LE dressing to mod assist. (Progressing)  Pt will increase RUE function to  be able to transfer supine to sit with min assist from right side. (Progressing)    Plan   Next session to focus on core strength/mobility, dynamic sitting/standing bal training, ADL training, and reassessment.    Follow Up  Follow up in: 5 days

## 2024-10-14 ENCOUNTER — CLINICAL SUPPORT (OUTPATIENT)
Dept: REHABILITATION | Facility: HOSPITAL | Age: 44
End: 2024-10-14
Payer: OTHER GOVERNMENT

## 2024-10-14 DIAGNOSIS — I69.320 APHASIA AS LATE EFFECT OF CEREBROVASCULAR ACCIDENT (CVA): ICD-10-CM

## 2024-10-14 DIAGNOSIS — I67.89 CEREBROVASCULAR DISEASE, ACUTE: Primary | ICD-10-CM

## 2024-10-14 PROCEDURE — 92507 TX SP LANG VOICE COMM INDIV: CPT

## 2024-10-14 NOTE — PROGRESS NOTES
Speech and Language Therapy   Outpatient Progress Note      Date:  10/14/2024     Name: Yong Stallings   MRN: [id number]   Therapy Diagnosis:   Encounter Diagnoses   Name Primary?    Cerebrovascular disease, acute Yes    Aphasia as late effect of cerebrovascular accident (CVA)        Physician: Mayito Langston MD  Physician Orders: speech eval and treat  Medical Diagnosis: CVA    Visit #/Visits authorized: 10/15  Date of Evaluation:  07/23/24  Insurance Authorization Period: exp 03/15/2025  Plan of Care:      7/22/2024 to 10/22/2024     UNTIMED  Procedure Min.   {Speech- Language- Voice Therapy  45 min / 1 unit           Total Untimed Units: 1  Charges Billed/# of units: 1  Time In:  12:50  Time Out:  13:35  Total Billable Time: 45 min / 1 unit     Precautions: Standard and Fall    Subjective:     Pt reports: Pt participated well and in was in good spirits today. Brief discussion about last weeks difficult talk and Pt remains motivated and agreed to make small changes towards moving forward with disability (I.e. eating at the table and making his own plate)    Objective:       Short term goals:     Pt will complete automatic language tasks at mod level with >90% intelligibility and min cues. Pt completed automatic language tasks:  1-10: 100% acc 5/5   10-1: 100% acc 5/5    No phonemic tasks needed today.    Pt will verbalize name and personally relevant information via naming or wh-questions for basic communication with min phonemic cues.  Pt verbalized the following in response to questions:  Politics conversation: independently 6/7   General info: 20% acc, increase to 100% with phonemic cues     Pt will label personally relevant items with 75% acc min cues.  Pt labeled personally relevant household items with <25% acc, increase to 100% with phonemic cues and/or semantic cues. Unable to repeat at times.    Pt will comprehend personally relevant information at short story length with 75% acc no cues.  Pt answered  yes/no to questions after short news story with 77% acc, increase with repetition.   Pt will complete articulatory kinematic drills with CV/VC x4 units (with max of 1 unit different) x5 reps with 90% acc no cues. Completed CV combinations x4 as ARMs 80% acc.   Completed CV combinations with SMRs (only final CV changed) with 63% acc, increase to 100% acc with model/cues. Completed the same combinations x5 reps with 40% acc.     Increase with models and visual cues.     Long Term Goal: Pt will communicate basic wants and needs via any modality with trained caregivers.        Patient Education/Response:     Education re: eating at table and verbalizing all foods and mealtime items reviewed with Pt, caregiver, and father.    Written Home Exercises Provided: mealtime drills   Exercises were reviewed and Yong was able to demonstrate them prior to the end of the session.  Yong demonstrated good  understanding of the education provided.         Assessment:   Yong is showing minimal progress towards his goals.  Goals to be updated as needed.     Pt prognosis is Fair. Pt will continue to benefit from skilled outpatient speech and language therapy to address the deficits listed in the problem list on initial evaluation, provide pt/family education and to maximize pt's level of independence in the home and community environment.     KT NOMS (Functional Communication Measures):  KT NOMS: KANDI 24 CURRENT   Attention: 4 4 4   Memory: 4 5 5   Motor Speech: 3 4 3-4   Readin 4 4   Spoken Language Comprehension: 4 4 4   Spoken Language Expression: 2 3 2-3   Swallowin 7 7      Barriers to Therapy: prolonged period since CVA; unrealistic expectations of recovery despite education   Pt's spiritual, cultural and educational needs considered and pt agreeable to plan of care and goals.    Plan:   Continue POC with focus on functional communication of basic wants/needs.       Kate Olvera MA,  CCC-SLP  10/14/2024

## 2024-10-15 ENCOUNTER — CLINICAL SUPPORT (OUTPATIENT)
Dept: REHABILITATION | Facility: HOSPITAL | Age: 44
End: 2024-10-15
Payer: OTHER GOVERNMENT

## 2024-10-15 DIAGNOSIS — I67.89 CEREBROVASCULAR DISEASE, ACUTE: Primary | ICD-10-CM

## 2024-10-15 PROCEDURE — 97112 NEUROMUSCULAR REEDUCATION: CPT

## 2024-10-15 PROCEDURE — 97110 THERAPEUTIC EXERCISES: CPT

## 2024-10-16 ENCOUNTER — CLINICAL SUPPORT (OUTPATIENT)
Dept: REHABILITATION | Facility: HOSPITAL | Age: 44
End: 2024-10-16
Payer: OTHER GOVERNMENT

## 2024-10-16 DIAGNOSIS — I69.320 APHASIA AS LATE EFFECT OF CEREBROVASCULAR ACCIDENT (CVA): ICD-10-CM

## 2024-10-16 DIAGNOSIS — I67.89 CEREBROVASCULAR DISEASE, ACUTE: Primary | ICD-10-CM

## 2024-10-16 DIAGNOSIS — I69.322 DYSARTHRIA AS LATE EFFECT OF CEREBELLAR CEREBROVASCULAR ACCIDENT (CVA): ICD-10-CM

## 2024-10-16 PROCEDURE — 92507 TX SP LANG VOICE COMM INDIV: CPT

## 2024-10-16 NOTE — PROGRESS NOTES
Speech and Language Therapy   Outpatient Progress Note      Date:  10/16/2024     Name: Yong Stallings   MRN: [id number]   Therapy Diagnosis:   No diagnosis found.      Physician: Mayito Langston MD  Physician Orders: speech eval and treat  Medical Diagnosis: CVA    Visit #/Visits authorized: 11/15  Date of Evaluation:  07/23/24  Insurance Authorization Period: exp 03/15/2025  Plan of Care:      7/22/2024 to 10/22/2024     UNTIMED  Procedure Min.   {Speech- Language- Voice Therapy  45 min / 1 unit           Total Untimed Units: 1  Charges Billed/# of units: 1  Time In:  12:50  Time Out:  13:35  Total Billable Time: 45 min / 1 unit     Precautions: Standard and Fall    Subjective:     Pt reports: Pt participated well and appeared in good spirits. Improved motivation and appropriate effort with education re: CVA and brain regions linked to skills    Objective:       Short term goals:     Pt will complete automatic language tasks at mod level with >90% intelligibility and min cues. Pt completed automatic language tasks:  1-10: 100% acc 5/5   10-1: 100% acc 5/5  ABCs: 23/16 acc    No phonemic tasks needed today.    Pt will verbalize name and personally relevant information via naming or wh-questions for basic communication with min phonemic cues.  Pt verbalized the following in response to questions:  Foods: 100% acc min phonemic cues     Pt will label personally relevant items with 75% acc min cues.  Pt labeled personally relevant ADL  items with 73%acc with carrier phrases - errorless learning targeted.   Pt will comprehend personally relevant information at short story length with 75% acc no cues.  Pt ID words read aloud by SLP (f=4) with 63% acc.    Pt will complete articulatory kinematic drills with CV/VC x4 units (with max of 1 unit different) x5 reps with 90% acc no cues. Completed CV combinations (4 units x5) with SMRs (only final CV changed) with 33% acc, increase to 88%acc with cues     Increase with models and  "visual cues. Significant difficulty with "he" despite max cues - reduced comprehension and awareness noted. Errorless learning targeted throughout.     Long Term Goal: Pt will communicate basic wants and needs via any modality with trained caregivers.        Patient Education/Response:     Education re: neuro anatomy, brain regions, and Pt's CVA.    Written Home Exercises Provided: mealtime drills   Exercises were reviewed and Yong was able to demonstrate them prior to the end of the session.  Yong demonstrated good  understanding of the education provided.         Assessment:   Yong is showing minimal progress towards his goals.  Goals to be updated as needed. Improvement overall today within structured tasks.     Pt prognosis is Fair. Pt will continue to benefit from skilled outpatient speech and language therapy to address the deficits listed in the problem list on initial evaluation, provide pt/family education and to maximize pt's level of independence in the home and community environment.     KT NOMS (Functional Communication Measures):  KT NOMS: KANDI 24 CURRENT   Attention: 4 4 4   Memory: 4 5 5   Motor Speech: 3 4 3-4   Readin 4 4   Spoken Language Comprehension: 4 4 4   Spoken Language Expression: 2 3 2-3   Swallowin 7 7      Barriers to Therapy: prolonged period since CVA; unrealistic expectations of recovery despite education   Pt's spiritual, cultural and educational needs considered and pt agreeable to plan of care and goals.    Plan:   Continue POC with focus on functional communication of basic wants/needs.       Kate Olvera MA, CCC-SLP  10/16/2024                                                           "

## 2024-10-17 ENCOUNTER — CLINICAL SUPPORT (OUTPATIENT)
Dept: REHABILITATION | Facility: HOSPITAL | Age: 44
End: 2024-10-17
Payer: OTHER GOVERNMENT

## 2024-10-17 DIAGNOSIS — I67.89 CEREBROVASCULAR DISEASE, ACUTE: Primary | ICD-10-CM

## 2024-10-17 PROCEDURE — 97530 THERAPEUTIC ACTIVITIES: CPT

## 2024-10-17 NOTE — PROGRESS NOTES
Occupational Therapy  Occupational Therapy Outpatient Progress Note      Date: 10/15/2024  Name: Yong Stallings  Clinic Number: [id number]  : 23  Visit Number: 18      Subjective     Pt in good spirits and motivated for session.  Pt continues to demonstrate expressive aphasia/dysarthria.  Yong without pain reports today.      Patient's response to therapy: Yong tolerated session fair with good effort.      Objective     Current condition: Yong demonstrates severe right hemiplegia effecting functional transfers, ADLs, visual perception, and overall quality of life secondary to a CVA.      TREATMENT  Principle of treatment/treatment today: Transfer training, core strengthening, neuromotor re-ed, and dynamic sitting balance training.     Therapeutic Activity:  Transfer training wc to mat with sliding board.  Pt able to position wc and prepare for transfer SBA with tactile assist to remove right leg rest.  Pt able to place board under left thigh with min assist.  Pt able to perform transfer with min assist to adjusting feet.  Transfer training sit to supine to right side.  Pt able to cross bilat ankles without assist then transferred sit to supine min assist to raise BLE final 10-inches onto mat. In supine performed progressive stretches to BLEs.  While in supine with knees bent, fastened knees together with gait belt, performed side to side knee rocking l/r SBA then bridges 15 reps ea with SBA. OT performed right scapular mobilization followed by anterior capsular stretches. Performed slow progressive stretch to elbow, wrist, and digit flexors to neutral position. Transfer training supine to sit. Pt able to slide LLE off mat then gait belt looped around right foot as a leg , pt able to then slide RLE off mat SBA. Mod assist required to rotate upper trunk forward onto LLE then pt able to push up with min assist. Performed core strengthening with mini situps onto wedge 2x5 reps with min/mod assist pulling up  with BUEs. Transfer training sit to stand Mod assist blocking right knee and forward weight shift.  Wobble disc placed under pt in sitting.  Worked on core strength and balance with lateral weight shifts while reaching for cones laterally and across midline.  Noted definite increase in balance and core strength, was able to perform with minimal corrections.  Transfer training performed mat <> wc with sliding board min assist to right after setup.     Time In: 1220  Time Out: 1315  Total Appointment Time (timed & untimed codes): 55 minutes   Treatment time: Functional activities 30 minutes  Neuro re-education 25 minutes    Assessment     Summary/Analysis of session: Pt seen in clinic setting.  Pt early for session and well groomed. PT unavailable for cotx today.  Noted pt continues to progress with sliding board transfer, nearly CGA to left.  Noting increase in core strength and trunk control.  Severe right hemiplegia with apraxia continues to hamper overall functional abilities.  Recommend continuing PLC to address functional deficits.    Progress toward previous goals: Continue STG/LTG   Goals:      LTG: (8 Weeks)   Pt will increase toilet transfers to min assist. (Progressing)  Pt with be able to don UE garments with min assist and LE garments with mod assist. (Progressing)  Pt will increase RUE function to be able to transfer supine to sit CGA from right side. (Progressing)     STG: (4 Weeks)   Pt will increase toilet transfers to mod assist stand pivot. (Progressing)  Pt will increase UE dressing to CGA and LE dressing to mod assist. (Progressing)  Pt will increase RUE function to be able to transfer supine to sit with min assist from right side. (Progressing)    Plan   Next session to focus on core strength/mobility, dynamic sitting/standing bal training, ADL training, and reassessment.    Follow Up  Follow up in: 2 days

## 2024-10-18 ENCOUNTER — CLINICAL SUPPORT (OUTPATIENT)
Dept: REHABILITATION | Facility: HOSPITAL | Age: 44
End: 2024-10-18
Payer: OTHER GOVERNMENT

## 2024-10-18 DIAGNOSIS — I67.89 CEREBROVASCULAR DISEASE, ACUTE: Primary | ICD-10-CM

## 2024-10-18 DIAGNOSIS — I69.320 APHASIA AS LATE EFFECT OF CEREBROVASCULAR ACCIDENT (CVA): ICD-10-CM

## 2024-10-18 DIAGNOSIS — I69.322 DYSARTHRIA AS LATE EFFECT OF CEREBELLAR CEREBROVASCULAR ACCIDENT (CVA): ICD-10-CM

## 2024-10-18 PROCEDURE — 92507 TX SP LANG VOICE COMM INDIV: CPT

## 2024-10-19 NOTE — PROGRESS NOTES
Occupational Therapy  Occupational Therapy Outpatient Progress Note      Date: 10/17/2024  Name: Yong Stallings  Clinic Number: [id number]  : 23  Visit Number: 19      Subjective     Pt in good spirits and motivated for session.  Pt continues to demonstrate expressive aphasia/dysarthria.  Yong without pain reports today.      Patient's response to therapy: Yong tolerated session fair with good effort.      Objective     Current condition: Yong demonstrates severe right hemiplegia effecting functional transfers, ADLs, visual perception, and overall quality of life secondary to a CVA.      TREATMENT  Principle of treatment/treatment today: Transfer training, core strengthening, neuromotor re-ed, and dynamic sitting/standing balance training.     Therapeutic Activity:  Began today with transfer wc<> Nustep min assist left mod to right stand pivot.  Pt performed Nustep with BLE/LUE 3 mins then BLE's 5 mins. Yong required tactile assist and tapping of hamstrings to elicit contraction the first 2 mins then was able to perform the final 3 mins without assist. Transfer training wc to mat with sliding board.  Pt able to position wc and prepare for transfer 1 tactile assist.  Pt able to place board with min assist.  Pt able to perform transfer with min assist to adjusting feet after 2 attempts. Transfer training performed sit to stands from elevated seat using rail with LUE.  Pt performed 3 reps, with mod assist and verbal cuing to sequence mechanics and maintain forward trunk flexion.  While in standing worked on weight shifting to unload LE followed by 5 attempts to raise LE off floor to step forward.  Pt able to perform with max assist to block RLE and maintain extension.  Transfer training performed mat <> wc with sliding board min assist to right after setup.     Time In: 1230  Time Out: 1315  Total Appointment Time (timed & untimed codes): 45 minutes   Treatment time: Functional activities 45 minutes  Neuro  re-education   minutes    Assessment     Summary/Analysis of session: Pt seen in clinic setting.  Pt early for session and well groomed.  Unable to perform cotx with PT secondary to scheduling.  Noted pt continues to struggle with motor planning left foot pivot with stand pivot transfers.  Severe right hemiplegia with apraxia continues to hamper overall functional abilities.  Recommend continuing PLC to address functional deficits.    Progress toward previous goals: Continue STG/LTG   Goals:      LTG: (8 Weeks)   Pt will increase toilet transfers to min assist. (Progressing)  Pt with be able to don UE garments with min assist and LE garments with mod assist. (Progressing)  Pt will increase RUE function to be able to transfer supine to sit CGA from right side. (Progressing)     STG: (4 Weeks)   Pt will increase toilet transfers to mod assist stand pivot. (Progressing)  Pt will increase UE dressing to CGA and LE dressing to mod assist. (Progressing)  Pt will increase RUE function to be able to transfer supine to sit with min assist from right side. (Progressing)    Plan   Next session to focus on core strength/mobility, dynamic sitting/standing bal training, ADL training, and reassessment.    Follow Up  Follow up in: 5 days

## 2024-10-21 ENCOUNTER — CLINICAL SUPPORT (OUTPATIENT)
Dept: REHABILITATION | Facility: HOSPITAL | Age: 44
End: 2024-10-21
Payer: OTHER GOVERNMENT

## 2024-10-21 DIAGNOSIS — I69.322 DYSARTHRIA AS LATE EFFECT OF CEREBELLAR CEREBROVASCULAR ACCIDENT (CVA): ICD-10-CM

## 2024-10-21 DIAGNOSIS — I67.89 CEREBROVASCULAR DISEASE, ACUTE: Primary | ICD-10-CM

## 2024-10-21 DIAGNOSIS — I69.320 APHASIA AS LATE EFFECT OF CEREBROVASCULAR ACCIDENT (CVA): ICD-10-CM

## 2024-10-21 PROCEDURE — 92507 TX SP LANG VOICE COMM INDIV: CPT

## 2024-10-21 NOTE — PROGRESS NOTES
Speech and Language Therapy   Outpatient Progress Note      Date:  10/18/2024     Name: Yong Stallings   MRN: [id number]   Therapy Diagnosis:   Encounter Diagnoses   Name Primary?    Cerebrovascular disease, acute Yes    Aphasia as late effect of cerebrovascular accident (CVA)     Dysarthria as late effect of cerebellar cerebrovascular accident (CVA)          Physician: Mayito Langston MD  Physician Orders: speech eval and treat  Medical Diagnosis: CVA    Visit #/Visits authorized: 12/15  Date of Evaluation:  07/23/24  Insurance Authorization Period: exp 03/15/2025  Plan of Care:      7/22/2024 to 10/22/2024     UNTIMED  Procedure Min.   {Speech- Language- Voice Therapy  45 min / 1 unit           Total Untimed Units: 1  Charges Billed/# of units: 1  Time In:  12:45  Time Out:  13:30  Total Billable Time: 45 min / 1 unit     Precautions: Standard and Fall    Subjective:     Pt reports: Pt con't to participate well. Pt appears happy with improved artic-kinematic drills last few sessions.     Objective:       Short term goals:     Pt will complete automatic language tasks at mod level with >90% intelligibility and min cues. Pt completed automatic language tasks:  1-10: 100% acc 5/5   10-1: 100% acc 5/5  ABCs: 25/26 acc    No cues required today.    No phonemic tasks needed today.    Pt will verbalize name and personally relevant information via naming or wh-questions for basic communication with min phonemic cues.  Pt verbalized the following in response to questions:  Foods and everyday kitchen items: 100% acc with phonemic cues; indpendent x5; CP only x3     Pt will label personally relevant items with 75% acc min cues.     Pt will comprehend personally relevant information at short story length with 75% acc no cues.     Pt will complete articulatory kinematic drills with CV/VC x4 units (with max of 1 unit different) x5 reps with 90% acc no cues. Completed CV combinations (4 units x5) with SMRs (only final CV changed)  "with 55% acc, increase to 100%acc with cues     Reduced cueing required.  (Use of function words broken down)   Long Term Goal: Pt will communicate basic wants and needs via any modality with trained caregivers.        Patient Education/Response:     Education re: motor control and compensatory strategies for when "stuck"    Written Home Exercises Provided: mealtime drills, eating at table (not in the bed), verbal request from dad for TV   Exercises were reviewed and Yong was able to demonstrate them prior to the end of the session.  Yong demonstrated good  understanding of the education provided.         Assessment:   Yong is showing progress towards his goals.  Goals to be updated as needed. Improvement overall today and last session within structured tasks.     Pt prognosis is Fair. Pt will continue to benefit from skilled outpatient speech and language therapy to address the deficits listed in the problem list on initial evaluation, provide pt/family education and to maximize pt's level of independence in the home and community environment.     KT NOMS (Functional Communication Measures):  KT NOMS: KANDI 24 CURRENT   Attention: 4 4 4   Memory: 4 5 5   Motor Speech: 3 4 3-4   Readin 4 4   Spoken Language Comprehension: 4 4 4   Spoken Language Expression: 2 3 2-3   Swallowin 7 7      Barriers to Therapy: prolonged period since CVA; unrealistic expectations of recovery despite education   Pt's spiritual, cultural and educational needs considered and pt agreeable to plan of care and goals.    Plan:   Continue POC with focus on functional communication of basic wants/needs.       Kate Olvera MA, CCC-SLP  10/21/2024                                                             "

## 2024-10-23 NOTE — PROGRESS NOTES
Speech and Language Therapy   Outpatient Progress Note      Date:  10/21/2024     Name: Yong Stallings   MRN: [id number]   Therapy Diagnosis:   Encounter Diagnoses   Name Primary?    Cerebrovascular disease, acute Yes    Aphasia as late effect of cerebrovascular accident (CVA)     Dysarthria as late effect of cerebellar cerebrovascular accident (CVA)          Physician: Mayito Langston MD  Physician Orders: speech eval and treat  Medical Diagnosis: CVA    Visit #/Visits authorized: 13/15  Date of Evaluation:  07/23/24  Insurance Authorization Period: exp 03/15/2025  Plan of Care:      7/22/2024 to 10/22/2024     UNTIMED  Procedure Min.   {Speech- Language- Voice Therapy  45 min / 1 unit           Total Untimed Units: 1  Charges Billed/# of units: 1  Time In:  13:00  Time Out:  13:45  Total Billable Time: 45 min / 1 unit     Precautions: Standard and Fall    Subjective:     Pt reports: Pt con't to participate well with reduced frustration with harder artic-kinematic drills at beginning of session for more success.     Objective:       Short term goals:     Pt will complete automatic language tasks at mod level with >90% intelligibility and min cues. Pt completed automatic language tasks:  1-10: 100% acc 5/5   10-1: 100% acc 5/5  ABCs: 25/26 acc    No cues required today.     Pt will verbalize name and personally relevant information via naming or wh-questions for basic communication with min phonemic cues.  Pt verbalized the following in response to questions:  Foods and everyday kitchen items: 100% acc with phonemic cues; indpendent x5; CP only x3     Pt will label personally relevant items with 75% acc min cues.  Pt labeled personally relevant items + written word with 55% acc with carrier phrases, increase to 100% with phonemic cues   Pt will comprehend personally relevant information at short story length with 75% acc no cues.     Pt will complete articulatory kinematic drills with CV/VC x4 units (with max of 1  unit different) x5 reps with 90% acc no cues. Completed CV combinations (4 units x5) with SMRs (only final CV changed) x1 with 50% acc (increase to 100% with cues) and x5 with 40% acc, increase to 100%acc with cues     Reduced cueing required.  (Use of function words broken down)   Long Term Goal: Pt will communicate basic wants and needs via any modality with trained caregivers.        Patient Education/Response:     Education re: automatic language vs volitional language.    Written Home Exercises Provided:  verbal request from dad for specific wants/needs    Exercises were reviewed and Yong was able to demonstrate them prior to the end of the session.  Yong demonstrated good  understanding of the education provided.         Assessment:   Yong is showing progress towards his goals.  Goals to be updated as needed. Improvement overall within automatic tasks and artic-kinematic drills.    Pt prognosis is Fair. Pt will continue to benefit from skilled outpatient speech and language therapy to address the deficits listed in the problem list on initial evaluation, provide pt/family education and to maximize pt's level of independence in the home and community environment.     KT NOMS (Functional Communication Measures):  KT NOMS: KANDI 24 CURRENT   Attention: 4 4 4   Memory: 4 5 5   Motor Speech: 3 4 3-4   Readin 4 4   Spoken Language Comprehension: 4 4 4   Spoken Language Expression: 2 3 2-3   Swallowin 7 7      Barriers to Therapy: prolonged period since CVA; unrealistic expectations of recovery despite education   Pt's spiritual, cultural and educational needs considered and pt agreeable to plan of care and goals.    Plan:   Continue POC with focus on functional communication of basic wants/needs.       Kate Olvera MA, CCC-SLP  10/23/2024

## 2024-10-24 ENCOUNTER — CLINICAL SUPPORT (OUTPATIENT)
Dept: REHABILITATION | Facility: HOSPITAL | Age: 44
End: 2024-10-24
Payer: OTHER GOVERNMENT

## 2024-10-24 DIAGNOSIS — I67.89 CEREBROVASCULAR DISEASE, ACUTE: Primary | ICD-10-CM

## 2024-10-24 DIAGNOSIS — I69.322 DYSARTHRIA AS LATE EFFECT OF CEREBELLAR CEREBROVASCULAR ACCIDENT (CVA): ICD-10-CM

## 2024-10-24 DIAGNOSIS — I69.320 APHASIA AS LATE EFFECT OF CEREBROVASCULAR ACCIDENT (CVA): ICD-10-CM

## 2024-10-24 PROCEDURE — 97530 THERAPEUTIC ACTIVITIES: CPT

## 2024-10-24 PROCEDURE — 92507 TX SP LANG VOICE COMM INDIV: CPT

## 2024-10-24 PROCEDURE — 97112 NEUROMUSCULAR REEDUCATION: CPT

## 2024-10-25 ENCOUNTER — CLINICAL SUPPORT (OUTPATIENT)
Dept: REHABILITATION | Facility: HOSPITAL | Age: 44
End: 2024-10-25
Payer: OTHER GOVERNMENT

## 2024-10-25 DIAGNOSIS — I69.322 DYSARTHRIA AS LATE EFFECT OF CEREBELLAR CEREBROVASCULAR ACCIDENT (CVA): ICD-10-CM

## 2024-10-25 DIAGNOSIS — I67.89 CEREBROVASCULAR DISEASE, ACUTE: Primary | ICD-10-CM

## 2024-10-25 DIAGNOSIS — I69.320 APHASIA AS LATE EFFECT OF CEREBROVASCULAR ACCIDENT (CVA): ICD-10-CM

## 2024-10-25 PROCEDURE — 92507 TX SP LANG VOICE COMM INDIV: CPT

## 2024-10-25 NOTE — PROGRESS NOTES
Speech and Language Therapy   Outpatient Progress Note      Date:  10/24/2024     Name: Yong Stallings   MRN: [id number]   Therapy Diagnosis:   Encounter Diagnoses   Name Primary?    Cerebrovascular disease, acute Yes    Aphasia as late effect of cerebrovascular accident (CVA)     Dysarthria as late effect of cerebellar cerebrovascular accident (CVA)          Physician: Mayito Langston MD  Physician Orders: speech eval and treat  Medical Diagnosis: CVA    Visit #/Visits authorized: 14/15  Date of Evaluation:  07/23/24  Insurance Authorization Period: exp 03/15/2025  Plan of Care:      7/22/2024 to 10/22/2024     UNTIMED  Procedure Min.   {Speech- Language- Voice Therapy  45 min / 1 unit           Total Untimed Units: 1  Charges Billed/# of units: 1  Time In:  13:15  Time Out:  14:00  Total Billable Time: 45 min / 1 unit     Precautions: Standard and Fall    Subjective:     Pt reports: Pt con't to participate well with reduced motor speech ability as session progressed, Pt demonstrated sadness with difficulty but con't to put forth effort.    Objective:       Short term goals:     Pt will complete automatic language tasks at mod level with >90% intelligibility and min cues. Pt completed automatic language tasks:  1-10: 100% acc 5/5   10-1: 100% acc 5/5  ABCs: 21-25/26 acc    No cues required today.  Pt unable to label or ID letters in isolation (outside of automatic language)     Pt will verbalize name and personally relevant information via naming or wh-questions for basic communication with min phonemic cues.       Pt will label personally relevant items with 75% acc min cues.  Pt labeled personally relevant items + written word with 58% acc with carrier phrases, increase to 100% with phonemic cues   Pt will comprehend personally relevant information at short story length with 75% acc no cues.  Pt answered yes/no questions in response to short stories on interest (politics) with 70% acc with max repetition required.  Pt unaware of errors without cues.    Pt will complete articulatory kinematic drills with CV/VC x4 units (with max of 1 unit different) x5 reps with 90% acc no cues. Completed CV combinations (4 units x5) with SMRs (only final CV changed) x1 with 70% acc (increase to 100% with cues) and x5 with 70% acc, increase to 100%acc with cues     Reduced cueing required.  (Use of function words broken down)   Long Term Goal: Pt will communicate basic wants and needs via any modality with trained caregivers.        Patient Education/Response:     Education re: use of functional words at home.     Written Home Exercises Provided:  verbal request from dad for specific wants/needs    Exercises were reviewed and Yong was able to demonstrate them prior to the end of the session.  Yong demonstrated good  understanding of the education provided.         Assessment:   Yong is showing progress towards his goals.  Goals to be updated as needed. Improvement overall within automatic tasks and artic-kinematic drills.    Pt prognosis is Fair. Pt will continue to benefit from skilled outpatient speech and language therapy to address the deficits listed in the problem list on initial evaluation, provide pt/family education and to maximize pt's level of independence in the home and community environment.     KT NOMS (Functional Communication Measures):  KT NOMS: BREANAAL 24 CURRENT   Attention: 4 4 4   Memory: 4 5 5   Motor Speech: 3 4 3-4   Readin 4 4   Spoken Language Comprehension: 4 4 4   Spoken Language Expression: 2 3 2-3   Swallowin 7 7      Barriers to Therapy: prolonged period since CVA; unrealistic expectations of recovery despite education   Pt's spiritual, cultural and educational needs considered and pt agreeable to plan of care and goals.    Plan:   Continue POC with focus on functional communication of basic wants/needs.       Kate Olvera MA, CCC-SLP  10/25/2024

## 2024-10-25 NOTE — PROGRESS NOTES
"    Speech and Language Therapy   REASSESSMENT      Date:  10/25/2024     Name: Yong Stallings   MRN: [id number]   Therapy Diagnosis:   Encounter Diagnoses   Name Primary?    Cerebrovascular disease, acute Yes    Aphasia as late effect of cerebrovascular accident (CVA)     Dysarthria as late effect of cerebellar cerebrovascular accident (CVA)          Physician: Mayito Langston MD  Physician Orders: speech eval and treat  Medical Diagnosis: CVA    Visit #/Visits authorized: 15/15  Date of Evaluation:  07/23/24  Insurance Authorization Period: exp 03/15/2025  Plan of Care:      7/22/2024 to 10/22/2024     UNTIMED  Procedure Min.   {Speech- Language- Voice Therapy  50 min / 1 unit           Total Untimed Units: 1  Charges Billed/# of units: 1  Time In:  12:40  Time Out:  13:30  Total Billable Time: 50 min / 1 unit     Precautions: Standard and Fall    Subjective:     Pt reports: Pt con't to participate well. Pt demonstrated sadness and frustration at times due to communication difficulty. Pt repeats "I know it!" Frequently as he knows the answer but has difficulty expressing what he wants to.     Objective:       REASSESSMENT with [facility] Diagnostic Aphasia Exam.  Raw score results are as follows:     [place] NAMING EMY short: 2/15 (Pts score is below mean = severe impairment)  - consistent improvement with phonemic cues     CONVERSATIONAL AND EXPOSITORY SPEECH:   Articulatory agility: 2/7  Phrase length 4/7  Grammatical form: 4/7   Prosody: 1/7  Paraphasias in running speech: 6/7  Word finding relative to fluency: 6/7     AUDITORY COMPREHENSION:  Basic word discrimination: 32/37   Commands: 5/15     ORAL EXPRESSION:  Oral agility  - Nonverbal agility: 1/12  - Verbal agility: 5/14   Automatized sequences: 4/8  Recitation, Rebecca, Rhythm: 0, 0, 0  Repetition:   - single words: 9/10   - Repetition of nonsense words: 0/5 (reduced awareness of errors)  - Repetition of sentences: 0/10 (reduced awareness of errors)  Naming  - " Responsive Naming: 3/10 (consistent improvement with phonemic cues)  - BNT short: 2/15          Short term goals:     Pt will complete automatic language tasks at mod level with >90% intelligibility and min cues.      Pt will verbalize name and personally relevant information via labeling and/or wh-questions for basic communication with min phonemic cues.       Pt will comprehend personally relevant information at short story length with 75% acc no cues.     Pt will complete articulatory kinematic drills with CV/VC x4 units (with max of 1 unit different) x5 reps with 90% acc no cues.    Long Term Goal: Pt will communicate basic wants and needs via any modality with trained caregivers.        Patient Education/Response:     Education re: need for re-assessment and improvements noted. Comprehension deficits and reduced awareness to these deficits was reviewed.     Written Home Exercises Provided:  verbal request from dad for specific wants/needs    Exercises were reviewed and Yong was able to demonstrate them prior to the end of the session.  Yong demonstrated good  understanding of the education provided.         Assessment:   Yong is showing progress towards his goals.  Pt demonstrated improved word strings in conversation, improved automatic language, and improved use of motor-speech strategies. Pt con't to required phonemic cues consistently for basic single word expression in structured tasks. Motor speech remains grossly impaired. Reduced receptive language with reduced awareness to lack of understanding at mod-complex levels.    Pt prognosis is Fair. Pt will continue to benefit from skilled outpatient speech and language therapy to address the deficits listed in the problem list on initial evaluation, provide pt/family education and to maximize pt's level of independence in the home and community environment.     KT NOMS (Functional Communication Measures):  KT NOMS: KANDI 07/22/24 08/26/24 10/09/24 10/25/24    Attention: 4 4 4 4   Memory: 4 5 5 5   Motor Speech: 3 4 3-4 3-4   Readin 4 4 4   Spoken Language Comprehension: 4 4 4 4   Spoken Language Expression: 2 3 2-3 3   Swallowin 7 7 7      Barriers to Therapy: prolonged period since CVA; unrealistic expectations of recovery despite education   Pt's spiritual, cultural and educational needs considered and pt agreeable to plan of care and goals.    Plan:   Continue POC with focus on functional communication of basic wants/needs.       Kate Olvera MA, CCC-SLP  10/25/2024

## 2024-10-25 NOTE — PROGRESS NOTES
Occupational Therapy  Occupational Therapy Outpatient Progress Note      Date: 10/24/2024  Name: Yong Stallings  Clinic Number: [id number]  : 23  Visit Number: 20      Subjective     Pt in good spirits and motivated for session.  Pt continues to demonstrate expressive aphasia/dysarthria.  Yong without pain reports today.      Patient's response to therapy: Yong tolerated session fair with good effort.      Objective     Current condition: Yong demonstrates severe right hemiplegia effecting functional transfers, ADLs, visual perception, and overall quality of life secondary to a CVA.      TREATMENT  Principle of treatment/treatment today: Transfer training, core strengthening, neuromotor re-ed, and dynamic standing balance training.     Therapeutic Activity:  Began today with transfer wc<> Nustep min assist left, mod to right stand pivot.  Pt performed Nustep with BLE/LUE 5 mins then BLE's 6 mins. When performing with BLEs only, Yong required tactile assist and tapping of hamstrings to elicit contraction the first 3 mins then was able to perform the final 3 mins without assist.  Transfer training performed sit <> stand 5 reps mod assist pulling up on rail faceing wall mirror.  Pt tolerated standing 2-3 mins ea stand with min assist to block right knee and maintain hip alignment.  While standing worked on oculomotor scanning activity across midline l/r.  Pt able to perform with 50% accuracy.  Seated bilateral hands clasped core strengthening ex's 1x10 reps forward/back then left/right with min tactile assist.      Time In: 1225  Time Out: 1315  Total Appointment Time (timed & untimed codes): 50 minutes   Treatment time: Functional activities 40 minutes  Neuro re-education 10 minutes    Assessment     Summary/Analysis of session: Pt seen in clinic setting.  Pt early for session and well groomed.  Unable to perform cotx with PT secondary to scheduling.  Noted increasing overall endurance and RLE activation during  NuStep. Noted pt continues to struggle with motor planning left foot pivot with stand pivot transfers.  Severe right hemiplegia with apraxia continues to hamper overall functional abilities.  Recommend continuing PLC to address functional deficits.    Progress toward previous goals: Continue STG/LTG   Goals:      LTG: (8 Weeks)   Pt will increase toilet transfers to min assist. (Progressing)  Pt with be able to don UE garments with min assist and LE garments with mod assist. (Progressing)  Pt will increase RUE function to be able to transfer supine to sit CGA from right side. (Progressing)     STG: (4 Weeks)   Pt will increase toilet transfers to mod assist stand pivot. (Progressing)  Pt will increase UE dressing to CGA and LE dressing to mod assist. (Progressing)  Pt will increase RUE function to be able to transfer supine to sit with min assist from right side. (Progressing)    Plan   Next session to focus on core strength/mobility, dynamic sitting/standing bal training, ADL training, and reassessment.    Follow Up  Follow up in: 5 days

## 2024-10-29 ENCOUNTER — CLINICAL SUPPORT (OUTPATIENT)
Dept: REHABILITATION | Facility: HOSPITAL | Age: 44
End: 2024-10-29
Payer: OTHER GOVERNMENT

## 2024-10-29 DIAGNOSIS — I67.89 CEREBROVASCULAR DISEASE, ACUTE: Primary | ICD-10-CM

## 2024-10-29 PROCEDURE — 97530 THERAPEUTIC ACTIVITIES: CPT

## 2024-10-29 PROCEDURE — 97112 NEUROMUSCULAR REEDUCATION: CPT

## 2024-10-29 PROCEDURE — 97110 THERAPEUTIC EXERCISES: CPT

## 2024-10-31 ENCOUNTER — CLINICAL SUPPORT (OUTPATIENT)
Dept: REHABILITATION | Facility: HOSPITAL | Age: 44
End: 2024-10-31
Payer: OTHER GOVERNMENT

## 2024-10-31 DIAGNOSIS — I67.89 CEREBROVASCULAR DISEASE, ACUTE: Primary | ICD-10-CM

## 2024-10-31 PROCEDURE — 97112 NEUROMUSCULAR REEDUCATION: CPT

## 2024-10-31 PROCEDURE — 97530 THERAPEUTIC ACTIVITIES: CPT

## 2024-10-31 PROCEDURE — 97110 THERAPEUTIC EXERCISES: CPT

## 2024-11-05 ENCOUNTER — CLINICAL SUPPORT (OUTPATIENT)
Dept: REHABILITATION | Facility: HOSPITAL | Age: 44
End: 2024-11-05
Payer: OTHER GOVERNMENT

## 2024-11-05 DIAGNOSIS — I67.89 CEREBROVASCULAR DISEASE, ACUTE: Primary | ICD-10-CM

## 2024-11-05 PROCEDURE — 97110 THERAPEUTIC EXERCISES: CPT

## 2024-11-05 PROCEDURE — 97530 THERAPEUTIC ACTIVITIES: CPT

## 2024-11-06 ENCOUNTER — CLINICAL SUPPORT (OUTPATIENT)
Dept: REHABILITATION | Facility: HOSPITAL | Age: 44
End: 2024-11-06
Payer: OTHER GOVERNMENT

## 2024-11-06 DIAGNOSIS — I69.320 APHASIA AS LATE EFFECT OF CEREBROVASCULAR ACCIDENT (CVA): ICD-10-CM

## 2024-11-06 DIAGNOSIS — I69.322 DYSARTHRIA AS LATE EFFECT OF CEREBELLAR CEREBROVASCULAR ACCIDENT (CVA): ICD-10-CM

## 2024-11-06 DIAGNOSIS — I67.89 CEREBROVASCULAR DISEASE, ACUTE: Primary | ICD-10-CM

## 2024-11-06 PROCEDURE — 92507 TX SP LANG VOICE COMM INDIV: CPT

## 2024-11-06 NOTE — PROGRESS NOTES
[facility]  Speech and Language Therapy   Progress Note      Date:  11/6/2024     Name: Yong Stallings   MRN: [id number]   Therapy Diagnosis:   Encounter Diagnoses   Name Primary?    Cerebrovascular disease, acute Yes    Aphasia as late effect of cerebrovascular accident (CVA)     Dysarthria as late effect of cerebellar cerebrovascular accident (CVA)          Physician: Mayito Langston MD  Physician Orders: speech eval and treat  Medical Diagnosis: CVA    Visit #/Visits authorized: 1/15  Date of Evaluation:  07/23/24  Insurance Authorization Period: 11/05/24 - 03/15/25  Plan of Care (extension):    10/25/24 - 01/25/24      UNTIMED  Procedure Min.   {Speech- Language- Voice Therapy  50 min / 1 unit           Total Untimed Units: 1  Charges Billed/# of units: 1  Time In:  12:45  Time Out:  13:35  Total Billable Time: 50 min / 1 unit     Precautions: Standard and Fall    Subjective:     Pt reports: Pt con't to participate well throughout despite frustration with difficulty at end of session. Pt laughed frequently and attempted conversation frequently.     Objective:       Short term goals:     Pt will complete automatic language tasks at mod level with >90% intelligibility and min cues. Pt stated ABC's with visual cues with 24/26 acc, increase with min cues.      Pt will verbalize name and personally relevant information via labeling and/or wh-questions for basic communication with min phonemic cues.       Pt will comprehend personally relevant information at short story length with 75% acc no cues.  Pt ID's letters from verbal letter + phoneme with 40% acc, increase to 93% acc with mod cues.    Pt will complete articulatory kinematic drills with CV/VC x4 units (with max of 1 unit different) x5 reps with 90% acc no cues. Pt completed drills of 4 units x1 with word parts with 80% acc (increase to 100% with cues) and with CV 4 units x1 with 40% acc (increase to 100% with cues.   Pt completed drills  of 4 units x5 with word parts with 80% acc (increase to 100% with cues) and with CV 4 units x5 with 20% acc (increase to 100% with cues.    Long Term Goal: Pt will communicate basic wants and needs via any modality with trained caregivers.        Patient Education/Response:     Education re: affect of practice on accuracy with ABC's and function words - encouraged con't practice at home for increased functional improvement.    Written Home Exercises Provided:  Pt's dad emailed SLP list of common food/menu items, email not received yet. Con'y automatic language task and function words  Exercises were reviewed and Yong was able to demonstrate them prior to the end of the session.  Yong demonstrated good  understanding of the education provided.         Assessment:   Yong is showing progress towards his goals.  Pt's motor speech skills seem to deteriorate as session progresses.    Pt prognosis is Fair. Pt will continue to benefit from skilled outpatient speech and language therapy to address the deficits listed in the problem list on initial evaluation, provide pt/family education and to maximize pt's level of independence in the home and community environment.     KT NOMS (Functional Communication Measures):  KT NOMS: BREANAAL 07/22/24 08/26/24 10/09/24 10/25/24   Attention: 4 4 4 4   Memory: 4 5 5 5   Motor Speech: 3 4 3-4 3-4   Readin 4 4 4   Spoken Language Comprehension: 4 4 4 4   Spoken Language Expression: 2 3 2-3 3   Swallowin 7 7 7      Barriers to Therapy: prolonged period since CVA; unrealistic expectations of recovery despite education   Pt's spiritual, cultural and educational needs considered and pt agreeable to plan of care and goals.    Plan:   Continue POC, 45 minutes, 3x/week with focus on functional communication of basic wants/needs.       Kate Olvera MA, CCC-SLP  2024

## 2024-11-06 NOTE — PROGRESS NOTES
Occupational Therapy  Occupational Therapy Outpatient Progress Note      Date: 2024  Name: Yong Stallings  Clinic Number: [id number]  : 24  Visit Number: 24      Subjective     Pt in good spirits and motivated for session.  Pt continues to demonstrate expressive aphasia/dysarthria.  Yong is without pain reports today.      Patient's response to therapy: Yong tolerated session fair with good effort.      Objective     Current condition: Yong demonstrates severe right hemiplegia effecting functional transfers, ADLs, visual perception, and overall quality of life secondary to a CVA.      TREATMENT  Principle of treatment/treatment today: Transfer training, core strengthening, neuromotor re-ed, and dynamic standing balance training.     Therapeutic Activity:  Began today with transfer wc<> Nustep min assist left, mod to right stand pivot.  Pt performed Nustep with BLE/LUE 5 mins level 3, then BLE's 7 mins level 1. When performing with BLEs only, Yong required tactile assist and tapping of quad tendon to elicit contraction the first 30 seconds, then was able to perform the final 6.5 mins with 7 tactile cues to maintain quad activity. At elevated mat, performed sit to stand 3 reps with max assist to block RLE and weight shift forward pushing up from wheelchair with LUE then placing hand flat on mat.  Pt tolerated standing 6 mins, 3 mins, 1 min, while working on weight shift postural alignment and weight bearing through RUE.  Seated in wc bilateral hands clasped core strengthening forward weight shift then trunk rotations 10 reps ea with min tactile guiding.    Time In: 1225  Time Out: 1310  Total Appointment Time (timed & untimed codes): 45 minutes   Treatment time: Functional activities 30 minutes  Neuro re-education   minutes  Therapeutic exercise 15      Assessment     Summary/Analysis of session: Pt seen in clinic setting.  Pt early for session and well groomed.  Unable to perform cotx with PT secondary to  scheduling.  Noted pt increasing RLE activation and endurance on NuStep  and RLE activation during NuStep. Noted improvement with motor planning left foot pivot with stand pivot transfers.  Severe right hemiplegia with apraxia continues to hamper overall functional abilities.  Recommend continuing PLC to address functional deficits.    Progress toward previous goals: Continue STG/LTG   Goals:      LTG: (8 Weeks)   Pt will increase toilet transfers to min assist. (Progressing)  Pt with be able to don UE garments with min assist and LE garments with mod assist. (Progressing)  Pt will increase RUE function to be able to transfer supine to sit CGA from right side. (Progressing)     STG: (4 Weeks)   Pt will increase toilet transfers to mod assist stand pivot. (Progressing)  Pt will increase UE dressing to CGA and LE dressing to mod assist. (Progressing)  Pt will increase RUE function to be able to transfer supine to sit with min assist from right side. (Progressing)    Plan   Next session to focus on core strength/mobility, dynamic sitting/standing bal training, ADL training, and reassessment.    Follow Up  Follow up in: 2 days

## 2024-11-07 ENCOUNTER — CLINICAL SUPPORT (OUTPATIENT)
Dept: REHABILITATION | Facility: HOSPITAL | Age: 44
End: 2024-11-07
Payer: OTHER GOVERNMENT

## 2024-11-07 DIAGNOSIS — I69.320 APHASIA AS LATE EFFECT OF CEREBROVASCULAR ACCIDENT (CVA): ICD-10-CM

## 2024-11-07 DIAGNOSIS — I67.89 CEREBROVASCULAR DISEASE, ACUTE: Primary | ICD-10-CM

## 2024-11-07 DIAGNOSIS — I69.322 DYSARTHRIA AS LATE EFFECT OF CEREBELLAR CEREBROVASCULAR ACCIDENT (CVA): ICD-10-CM

## 2024-11-07 PROCEDURE — 97530 THERAPEUTIC ACTIVITIES: CPT

## 2024-11-07 PROCEDURE — 92507 TX SP LANG VOICE COMM INDIV: CPT

## 2024-11-07 PROCEDURE — 97110 THERAPEUTIC EXERCISES: CPT

## 2024-11-07 NOTE — PROGRESS NOTES
"[facility]  Speech and Language Therapy   Progress Note      Date:  11/7/2024     Name: Yong Stallings   MRN: [id number]   Therapy Diagnosis:   Encounter Diagnoses   Name Primary?    Cerebrovascular disease, acute Yes    Aphasia as late effect of cerebrovascular accident (CVA)     Dysarthria as late effect of cerebellar cerebrovascular accident (CVA)          Physician: Mayito Langston MD  Physician Orders: speech eval and treat  Medical Diagnosis: CVA    Visit #/Visits authorized: 2/15  Date of Evaluation:  07/23/24  Insurance Authorization Period: 11/05/24 - 03/15/25  Plan of Care (extension):    10/25/24 - 01/25/24      UNTIMED  Procedure Min.   {Speech- Language- Voice Therapy  45 min / 1 unit           Total Untimed Units: 1  Charges Billed/# of units: 1  Time In:  11:45  Time Out:  13:30  Total Billable Time: 45 min / 1 unit     Precautions: Standard and Fall    Subjective:     Pt reports: Pt con't to demonstrate good effort. Significantly reduced perspective taking for social communication noted in role play conversation task. Pt described speech as "sometimes it comes out... and sometimes it won't."    Objective:       Short term goals:     Pt will complete automatic language tasks at mod level with >90% intelligibility and min cues.      Pt will verbalize name and personally relevant information via labeling and/or wh-questions for basic communication with min phonemic cues.  Role playing used to practice basic conversation with someone he has not seen in some time (I.e. small talk). Pt able to verbalize response to questions at word-phrase level with 57% acc, increase to 100% acc with phonemic cues or models. Difficulty with social communication and perspective taking re: giving too much information too soon. Pt labeled personally relevant items with <20% acc, increase to 100% with min phonemic cues.    Pt will comprehend personally relevant information at short story length with 75% " acc no cues.     Pt will complete articulatory kinematic drills with CV/VC x4 units (with max of 1 unit different) x5 reps with 90% acc no cues. Pt completed drills of 4 units x1 with CV with 60% acc (increase to 100% with cues) and x5 with 50% acc.  Increase to 100%acc with phonemic cues.   Long Term Goal: Pt will communicate basic wants and needs via any modality with trained caregivers.        Patient Education/Response:     Education re: social communication and stroke completed. Humor and calm education with examples attempted, Pt con't to disagree with SLPs suggestions for certain topics with certain people.     Written Home Exercises Provided:  function words at the table.   Exercises were reviewed and Yong was able to demonstrate them prior to the end of the session.  Yong demonstrated good  understanding of the education provided.         Assessment:   Yong is showing progress towards his goals.  Pt's motor speech skills seem to deteriorate as session progresses.    Pt prognosis is Fair. Pt will continue to benefit from skilled outpatient speech and language therapy to address the deficits listed in the problem list on initial evaluation, provide pt/family education and to maximize pt's level of independence in the home and community environment.     KT NOMS (Functional Communication Measures):  KT NOMS: EVAL 07/22/24 08/26/24 10/09/24 10/25/24   Attention: 4 4 4 4   Memory: 4 5 5 5   Motor Speech: 3 4 3-4 3-4   Readin 4 4 4   Spoken Language Comprehension: 4 4 4 4   Spoken Language Expression: 2 3 2-3 3   Swallowin 7 7 7      Barriers to Therapy: prolonged period since CVA; unrealistic expectations of recovery despite education   Pt's spiritual, cultural and educational needs considered and pt agreeable to plan of care and goals.    Plan:   Continue POC, 45 minutes, 3x/week with focus on functional communication of basic wants/needs.       Kate Olvera MA,  CCC-SLP  11/07/2024

## 2024-11-08 NOTE — PROGRESS NOTES
Occupational Therapy  Occupational Therapy Outpatient Progress Note      Date: 2024  Name: Yong Stallings  Clinic Number: [id number]  : 24  Visit Number: 25      Subjective     Pt in good spirits and motivated for session. Finishing session with ST.  Pt continues to demonstrate expressive aphasia/dysarthria.  Yong is without pain reports today.      Patient's response to therapy: Yong tolerated session fair with good effort.      Objective     Current condition: Yong demonstrates severe right hemiplegia effecting functional transfers, ADLs, visual perception, and overall quality of life secondary to a CVA.      TREATMENT  Principle of treatment/treatment today: Transfer training, core strengthening, neuromotor re-ed, and dynamic standing balance training.     Therapeutic Activity:  Began today with transfer wc<> Nustep min assist left, mod to right stand pivot.  Noted improvement pivoting left during transfer to left today.  Pt performed Nustep with BLE/LUE 5 mins level 4, then BLE's 7 mins level 1. When performing with BLEs only, Yong required tactile assist and tapping of quad tendon to elicit contraction the first 30 seconds, then was able to perform the final 6.5 mins with 5 tactile cues to maintain quad activity.  Transfer training wc to mat with sliding board.  Pt able to position wc and prepare for transfer with 1 tactile assist to remove right leg-rest.  Pt able to place board with min assist.  Pt able to perform transfer to left with CGA and verbal cues.  Performed core strengthening with lateral weight shift's onto elbows l/r 10 reps min assist, then mini situps 10 reps with min assist pulling up with BUEs.  Transfer training mat to wc min assist to right with sliding board.     Time In: 1230  Time Out: 1315  Total Appointment Time (timed & untimed codes): 45 minutes   Treatment time: Functional activities 30 minutes  Neuro re-education   minutes  Therapeutic exercise 15      Assessment      Summary/Analysis of session: Pt seen in clinic setting.  Pt early for session and well groomed.  Unable to perform cotx with PT secondary to scheduling.  Noted pt increasing RLE activation and endurance with NuStep. Noted improvement with motor planning left foot pivot with stand pivot transfers.  Severe right hemiplegia with apraxia continues to hamper overall functional abilities.  Recommend continuing PLC to address functional deficits.    Progress toward previous goals: Continue STG/LTG   Goals:      LTG: (8 Weeks)   Pt will increase toilet transfers to min assist. (Progressing)  Pt with be able to don UE garments with min assist and LE garments with mod assist. (Progressing)  Pt will increase RUE function to be able to transfer supine to sit CGA from right side. (Progressing)     STG: (4 Weeks)   Pt will increase toilet transfers to mod assist stand pivot. (Progressing)  Pt will increase UE dressing to CGA and LE dressing to mod assist. (Progressing)  Pt will increase RUE function to be able to transfer supine to sit with min assist from right side. (Progressing)    Plan   Next session to focus on core strength/mobility, dynamic sitting/standing bal training, ADL training, and reassessment.    Follow Up  Follow up in: 5 days

## 2024-11-11 ENCOUNTER — CLINICAL SUPPORT (OUTPATIENT)
Dept: REHABILITATION | Facility: HOSPITAL | Age: 44
End: 2024-11-11
Payer: OTHER GOVERNMENT

## 2024-11-11 DIAGNOSIS — I69.320 APHASIA AS LATE EFFECT OF CEREBROVASCULAR ACCIDENT (CVA): Primary | ICD-10-CM

## 2024-11-11 DIAGNOSIS — I67.89 OTHER CEREBROVASCULAR DISEASE: ICD-10-CM

## 2024-11-11 DIAGNOSIS — I69.322 DYSARTHRIA AS LATE EFFECT OF CEREBELLAR CEREBROVASCULAR ACCIDENT (CVA): ICD-10-CM

## 2024-11-11 PROCEDURE — 92507 TX SP LANG VOICE COMM INDIV: CPT

## 2024-11-11 NOTE — PROGRESS NOTES
"[facility]  Speech and Language Therapy   Progress Note      Date:  11/11/2024     Name: Yong Stallings   MRN: [id number]   Therapy Diagnosis:   Encounter Diagnoses   Name Primary?    Aphasia as late effect of cerebrovascular accident (CVA) Yes    Dysarthria as late effect of cerebellar cerebrovascular accident (CVA)     Other cerebrovascular disease          Physician: Mayito Langston MD  Physician Orders: speech eval and treat  Medical Diagnosis: CVA    Visit #/Visits authorized: 3/15  Date of Evaluation:  07/23/24  Insurance Authorization Period: 11/05/24 - 03/15/25  Plan of Care (extension):    10/25/24 - 01/25/24      UNTIMED  Procedure Min.   {Speech- Language- Voice Therapy  45 min / 1 unit           Total Untimed Units: 1  Charges Billed/# of units: 1  Time In:  11:45  Time Out:  13:30  Total Billable Time: 45 min / 1 unit     Precautions: Standard and Fall    Subjective:     Pt reports: Pt con't to demonstrate good effort. Significantly reduced perspective taking for social communication noted in role play conversation task. Pt described speech as "sometimes it comes out... and sometimes it won't."    Objective:       Short term goals:     Pt will complete automatic language tasks at mod level with >90% intelligibility and no cues in 3 consecutive sessions. (Updated 11/11/24) Pt completed ABCs with 24/26 acc no cues, increase with cues. Pt counted 1-10 and 10-1 with 100% acc without cues.      Pt will verbalize name and personally relevant information via labeling and/or wh-questions for basic communication with >90% acc/intelligibility min phonemic cues in 3 consecutive cues.  Role playing used to practice basic conversation/requests with family/caregivers. Pt able to verbalize at phrase level open ended with 90% acc, no cues. Pt able to fill in open ended with adequate word with max cues.    Pt will comprehend personally relevant information at short story length with 75% acc no " cues in 3 consecutive sessions.  Pt demonstrated comprehension of personally relevant conversation with mod cues required - cueing at sentence level required.   Pt will complete articulatory kinematic drills with CV/VC x4 units (with max of 1 unit different) x5 reps with 90% acc no cues 3 consecutive sessions.  Pt completed drills of 4 units x1 with CV with 100% acc (increase to 100% with cues) and x5 with 55% acc.   Long Term Goal: Pt will communicate basic wants and needs via any modality with trained caregivers.        Patient Education/Response:     Education re: communication of feelings with dad. SLP educated on social cognition.     Written Home Exercises Provided:  function words at the table.   Exercises were reviewed and Yong was able to demonstrate them prior to the end of the session.  Yong demonstrated good  understanding of the education provided.         Assessment:   Yong is showing progress towards his goals.  Goals updated due to improvement in automatic language. Pt able to respond more at phrase level, cues required for verbal response over gesture. SLP introduced use of Pt initiated carrier phrases for functional improvement.    Pt prognosis is Fair. Pt will continue to benefit from skilled outpatient speech and language therapy to address the deficits listed in the problem list on initial evaluation, provide pt/family education and to maximize pt's level of independence in the home and community environment.     KT NOMS (Functional Communication Measures):  KT NOMS: KANDI 07/22/24 08/26/24 10/09/24 10/25/24   Attention: 4 4 4 4   Memory: 4 5 5 5   Motor Speech: 3 4 3-4 3-4   Readin 4 4 4   Spoken Language Comprehension: 4 4 4 4   Spoken Language Expression: 2 3 2-3 3   Swallowin 7 7 7      Barriers to Therapy: prolonged period since CVA; unrealistic expectations of recovery despite education   Pt's spiritual, cultural and educational needs considered and pt agreeable to plan of care  and goals.    Plan:   Continue POC, 45 minutes, 2x/week with focus on functional communication of basic wants/needs. Changing to 2x per week due to Pt schedule.      Kate Olvera MA, CCC-SLP  11/11/2024

## 2024-11-14 ENCOUNTER — CLINICAL SUPPORT (OUTPATIENT)
Dept: REHABILITATION | Facility: HOSPITAL | Age: 44
End: 2024-11-14
Payer: OTHER GOVERNMENT

## 2024-11-14 DIAGNOSIS — I67.89 OTHER CEREBROVASCULAR DISEASE: Primary | ICD-10-CM

## 2024-11-14 DIAGNOSIS — I69.322 DYSARTHRIA AS LATE EFFECT OF CEREBELLAR CEREBROVASCULAR ACCIDENT (CVA): ICD-10-CM

## 2024-11-14 DIAGNOSIS — I69.320 APHASIA AS LATE EFFECT OF CEREBROVASCULAR ACCIDENT (CVA): Primary | ICD-10-CM

## 2024-11-14 PROCEDURE — 97110 THERAPEUTIC EXERCISES: CPT

## 2024-11-14 PROCEDURE — 97112 NEUROMUSCULAR REEDUCATION: CPT

## 2024-11-14 PROCEDURE — 92507 TX SP LANG VOICE COMM INDIV: CPT

## 2024-11-14 PROCEDURE — 97150 GROUP THERAPEUTIC PROCEDURES: CPT

## 2024-11-14 NOTE — PROGRESS NOTES
[facility]  Speech and Language Therapy   Progress Note      Date:  11/14/2024     Name: Yong Stallings   MRN: [id number]   Therapy Diagnosis:   Encounter Diagnoses   Name Primary?    Aphasia as late effect of cerebrovascular accident (CVA) Yes    Dysarthria as late effect of cerebellar cerebrovascular accident (CVA)          Physician: Mayito Langston MD  Physician Orders: speech eval and treat  Medical Diagnosis: CVA    Visit #/Visits authorized: 4/15  Date of Evaluation:  07/23/24  Insurance Authorization Period: 11/05/24 - 03/15/25  Plan of Care (extension):    10/25/24 - 01/25/24      UNTIMED  Procedure Min.   {Speech- Language- Voice Therapy  45 min / 1 unit           Total Untimed Units: 1  Charges Billed/# of units: 1  Time In:  11:30  Time Out:  12:15  Total Billable Time: 45 min / 1 unit     Precautions: Standard and Fall    Subjective:     Pt reports: Pt in good spirits and participated well. Improved insight to specific speech paraphasias.     Objective:       Short term goals:     Pt will complete automatic language tasks at mod level with >90% intelligibility and no cues in 3 consecutive sessions. (Updated 11/11/24) Pt completed ABCs with 24/26 acc no cues, increase with cues.     (1/3 consecutive sessions)   Pt will verbalize name and personally relevant information via labeling and/or wh-questions for basic communication with >90% acc/intelligibility min phonemic cues in 3 consecutive cues.  Role playing used to practice basic conversation/requests with family/caregivers. Pt responded to questions with fixed practice phrases with 70% acc, increase to 100% with models and phonemic cues.   Pt will comprehend personally relevant information at short story length with 75% acc no cues in 3 consecutive sessions.  Pt demonstrated comprehension of personally relevant conversation re: wants/needs with 77% acc with min cues   Pt will complete articulatory kinematic drills with CV/VC x4  "units (with max of 1 unit different) x5 reps with 90% acc no cues 3 consecutive sessions.  Pt completed drills of 4 units x1 with CV with 80% acc (increase to 100% with cues) and x5 with 67% acc. Pt demonstrated increased awareness of being "stuck" on previous output   Long Term Goal: Pt will communicate basic wants and needs via any modality with trained caregivers.        Patient Education/Response:     Education re: need to practice fixed functional phrases. Prognosis of decreased likelihood of completely fluent speech also reviewed again.     Written Home Exercises Provided:  function words at the table.   Exercises were reviewed and Yong was able to demonstrate them prior to the end of the session.  Yong demonstrated good  understanding of the education provided.         Assessment:   Yong is showing progress towards his goals.  Goals appropriate and will be updated as needed. Improved awareness of perseverative output.    Pt prognosis is Fair. Pt will continue to benefit from skilled outpatient speech and language therapy to address the deficits listed in the problem list on initial evaluation, provide pt/family education and to maximize pt's level of independence in the home and community environment.     KT NOMS (Functional Communication Measures):  KT NOMS: BREANAAL 07/22/24 08/26/24 10/09/24 10/25/24   Attention: 4 4 4 4   Memory: 4 5 5 5   Motor Speech: 3 4 3-4 3-4   Readin 4 4 4   Spoken Language Comprehension: 4 4 4 4   Spoken Language Expression: 2 3 2-3 3   Swallowin 7 7 7      Barriers to Therapy: prolonged period since CVA; unrealistic expectations of recovery despite education   Pt's spiritual, cultural and educational needs considered and pt agreeable to plan of care and goals.    Plan:   Continue POC, 45 minutes, 2x/week with focus on functional communication of basic wants/needs.       Kate Olvera MA, CCC-SLP  2024                                        "

## 2024-11-15 NOTE — PROGRESS NOTES
Occupational Therapy  Occupational Therapy Outpatient Progress Note      Date: 2024  Name: Yong Stallings  Clinic Number: [id number]  : 24  Visit Number: 26      Subjective     Pt in good spirits and motivated for session.  Finishing session with ST.  Pt continues to demonstrate expressive aphasia/dysarthria.  Yogn is without pain reports today.      Patient's response to therapy: Yong tolerated session fair with good effort.      Objective     Current condition: Yong demonstrates severe right hemiplegia effecting functional transfers, ADLs, visual perception, and overall quality of life secondary to a CVA.      TREATMENT  Principle of treatment/treatment today: Transfer training, core strengthening, neuromotor re-ed, and dynamic sitting balance training.     Therapeutic Activity:  Began today with transfer wc<> Nustep min assist left, mod to right stand pivot.  Noted today had more difficulty pivoting left to left than right.  Pt performed Nustep with BLE/LUE 5 mins level 4, then BLE's 6 mins level 1. When performing with BLEs only, Yong was able to begin without assistance and only required 3 tactile cues, tapping of patellar tendon, to maintain quad activity.  Transfer training wc to mat with sliding board.  Pt able to position wc and prepare for transfer with 1 tactile assist to remove right leg-rest.  Pt able to place board with min assist.  Pt able to cross bilat ankles without assist then transferred sit to supine min assist to raise BLE final 1/2-inch onto mat. In supine performed progressive stretches to BLEs.  While in supine with knees bent, fastened knees together with gait belt, performed side to side knee rocking l/r SBA then bridges 15 reps ea with SBA. OT performed right scapular mobilization followed by anterior capsular stretches. Performed slow progressive stretch to elbow, wrist, and digit flexors to neutral position.  Performed bed mobility training, rolling l/r 5 reps with hands  clasped/knees bent rocking method.  Noted pt able to roll to right CGA then min assist to left.  Transfer training supine to sit. Pt able to slide LLE off mat then gait belt looped around right foot as a leg , pt able to then slide RLE off mat SBA. Mod assist required to rotate upper trunk forward onto LLE then pt able to push up with min assist.  Performed core strengthening with lateral weight shift's onto elbows l/r 10 reps min assist, then mini situps 10 reps with min assist pulling up with BUEs.  Transfer training mat to wc min assist to right with sliding board.     Time In: 1215  Time Out: 1315  Total Appointment Time (timed & untimed codes): 50 minutes   Treatment time: Functional activities 30 minutes  Neuro re-education 10 minutes  Therapeutic exercise 10      Assessment     Summary/Analysis of session:  Pt seen in clinic setting.  Pt early for session and well groomed.  Unable to perform cotx with PT secondary to scheduling.  Noted pt increasing RLE activation and endurance with NuStep. Noted improvement with core strength during mat activities.  Severe right hemiplegia with apraxia continues to hamper overall functional abilities.  Recommend continuing PLC to address functional deficits.    Progress toward previous goals: Continue STG/LTG   Goals:      LTG: (8 Weeks)   Pt will increase toilet transfers to min assist. (Progressing)  Pt with be able to don UE garments with min assist and LE garments with mod assist. (Progressing)  Pt will increase RUE function to be able to transfer supine to sit CGA from right side. (Progressing)     STG: (4 Weeks)   Pt will increase toilet transfers to mod assist stand pivot. (Progressing)  Pt will increase UE dressing to CGA and LE dressing to mod assist. (Progressing)  Pt will increase RUE function to be able to transfer supine to sit with min assist from right side. (Progressing)    Plan   Next session to focus on core strength/mobility, dynamic sitting/standing  bal training, ADL training, and reassessment.    Follow Up  Follow up in: 5 days

## 2024-11-19 ENCOUNTER — CLINICAL SUPPORT (OUTPATIENT)
Dept: REHABILITATION | Facility: HOSPITAL | Age: 44
End: 2024-11-19
Payer: OTHER GOVERNMENT

## 2024-11-19 DIAGNOSIS — I69.320 APHASIA AS LATE EFFECT OF CEREBROVASCULAR ACCIDENT (CVA): Primary | ICD-10-CM

## 2024-11-19 DIAGNOSIS — I67.89 CEREBROVASCULAR DISEASE, ACUTE: ICD-10-CM

## 2024-11-19 DIAGNOSIS — I67.89 OTHER CEREBROVASCULAR DISEASE: Primary | ICD-10-CM

## 2024-11-19 DIAGNOSIS — I69.322 DYSARTHRIA AS LATE EFFECT OF CEREBELLAR CEREBROVASCULAR ACCIDENT (CVA): ICD-10-CM

## 2024-11-19 PROCEDURE — 92507 TX SP LANG VOICE COMM INDIV: CPT

## 2024-11-19 PROCEDURE — 97535 SELF CARE MNGMENT TRAINING: CPT

## 2024-11-19 PROCEDURE — 97110 THERAPEUTIC EXERCISES: CPT

## 2024-11-19 PROCEDURE — 97530 THERAPEUTIC ACTIVITIES: CPT

## 2024-11-19 PROCEDURE — 97112 NEUROMUSCULAR REEDUCATION: CPT

## 2024-11-19 NOTE — PROGRESS NOTES
"[facility]  Speech and Language Therapy   Progress Note      Date:  11/19/2024     Name: Yong Stallings   MRN: [id number]   Therapy Diagnosis:   Encounter Diagnoses   Name Primary?    Aphasia as late effect of cerebrovascular accident (CVA) Yes    Dysarthria as late effect of cerebellar cerebrovascular accident (CVA)     Cerebrovascular disease, acute          Physician: Mayito Langston MD  Physician Orders: speech eval and treat  Medical Diagnosis: CVA    Visit #/Visits authorized: 5/15  Date of Evaluation:  07/23/24  Insurance Authorization Period: 11/05/24 - 03/15/25  Plan of Care (extension):    10/25/24 - 01/25/24      UNTIMED  Procedure Min.   {Speech- Language- Voice Therapy  45 min / 1 unit           Total Untimed Units: 1  Charges Billed/# of units: 1  Time In:  11:15  Time Out:  13:00  Total Billable Time: 45 min / 1 unit     Precautions: Standard and Fall    Subjective:     Pt reports: Pt con't to participate well. Pt con't to ask "why" he has speech/language/mobility difficulty and required max cues away from ineffective gesture use.     Objective:       Short term goals:     Pt will complete automatic language tasks at mod level with >90% intelligibility and no cues in 3 consecutive sessions. (Updated 11/11/24) Pt completed ABCs with 23/26 acc min cues, increase with cues. 1-10 and 10-1 required initial phonemic cue to initiate sequence.    (0/3 consecutive sessions)   Pt will verbalize name and personally relevant information via labeling and/or wh-questions for basic communication with >90% acc/intelligibility min phonemic cues in 3 consecutive cues.  Pt answered questions and completed carrier phrases with personally relevant single words with written word cue with 57% acc, increase to 100% with mod phonemic cues.     Pt spelled function words x2 when stuck, unable to spell majority of the time. PT ID letter with 3/4 acc.  (0/3 consecutive sessions)   Pt will comprehend " personally relevant information at short story length with 75% acc no cues in 3 consecutive sessions.  Pt demonstrated comprehension of personally relevant conversation re: wants/needs with 70% acc with min cues  (0/3 consecutive sessions)   Pt will complete articulatory kinematic drills with CV/VC x4 units (with max of 1 unit different) x5 reps with 90% acc no cues 3 consecutive sessions.  Pt completed drills of 4 units x5 with CV with 71% acc mod-max cues.    Long Term Goal: Pt will communicate basic wants and needs via any modality with trained caregivers.      Pt participated in conversation with independent phrase use x4 and single word use x1. Unable to use strategies to self cue when stuck.    Patient Education/Response:     SLP educated Pt on phonemic level comprehension as a barrier to single phoneme production with awareness of errors.    Written Home Exercises Provided:  automatic language and spelling   Exercises were reviewed and Yong was able to demonstrate them prior to the end of the session.  Yong demonstrated good  understanding of the education provided.         Assessment:   Yong is showing progress towards his goals.  Goals appropriate and will be updated as needed. Improved awareness of perseverative output.    Pt prognosis is Fair. Pt will continue to benefit from skilled outpatient speech and language therapy to address the deficits listed in the problem list on initial evaluation, provide pt/family education and to maximize pt's level of independence in the home and community environment.     KT NOMS (Functional Communication Measures):  KT NOMS: KANDI 07/22/24 08/26/24 10/09/24 10/25/24   Attention: 4 4 4 4   Memory: 4 5 5 5   Motor Speech: 3 4 3-4 3-4   Readin 4 4 4   Spoken Language Comprehension: 4 4 4 4   Spoken Language Expression: 2 3 2-3 3   Swallowin 7 7 7      Barriers to Therapy: prolonged period since CVA; unrealistic expectations of recovery despite education   Pt's  spiritual, cultural and educational needs considered and pt agreeable to plan of care and goals.    Plan:   Continue POC, 45 minutes, 2x/week with focus on functional communication of basic wants/needs.       Kate Olvera MA, CCC-SLP  11/19/2024

## 2024-11-20 NOTE — PROGRESS NOTES
Occupational Therapy  Occupational Therapy Outpatient Progress Note      Date: 2024  Name: Yong Stallings  Clinic Number: [id number]  : 23  Visit Number: 27      Subjective     Pt in good spirits and motivated for session.  Finishing session with ST.  Pt continues to demonstrate expressive aphasia/dysarthria.  Yong is without pain reports today.      Patient's response to therapy: Yong tolerated session fair with good effort.      Objective     Current condition: Yong demonstrates severe right hemiplegia effecting functional transfers, ADLs, visual perception, and overall quality of life secondary to a CVA.      TREATMENT  Principle of treatment/treatment today: Transfer training, core strengthening, neuromotor re-ed, and dressing training.     Therapeutic Activity:  Began today with transfer wc<> Nustep min assist left, mod to right stand pivot.  Pt performed Nustep with BLE/LUE 5 mins level 5, then BLE's 6 mins level 1. When performing with BLEs only, Yong was able to begin without assistance and required 4 tactile cues, tapping of patellar tendon, to maintain alternating rhythm.  Transfer training wc to mat with sliding board.  Pt able to position wc and prepare for transfer with 1 tactile assist to remove right leg-rest.  Pt able to place board with 1 tactile assist to properly angle board to mat then transferred CGA.  Pt able to cross bilat ankles without assist then transferred sit to supine min assist to raise BLE final 6-inches onto mat. In supine performed progressive stretches to BLEs.  While in supine with knees bent, fastened knees together with gait belt, performed side to side knee rocking l/r SBA then bridges 15 reps ea with SBA.  OT performed right scapular mobilization followed by anterior capsular stretches. Performed slow progressive stretch to elbow, wrist, and digit flexors to neutral position.  Transfer training supine to sit. Pt able to slide LLE off mat then gait belt looped around  right foot as a leg , pt able to then slide RLE off mat SBA. Mod assist required to rotate upper trunk forward onto LLE then pt able to push up with min assist.  ADL UE dressing training performed sitting at EOB.  Pt able to remove pullover t-shirt with 1 tactile assist to pull over head, then donned with 1 tactile assist to hold RUE forward while pt pulled t-shirt up extremity.  Performed mini situps 10 reps with min assist pulling up with BUEs from wedge.  Transfer training mat to wc with sliding board to right.  After setup, pt able to transfer with 1 tactile assist to adjust feet.    Time In: 1200  Time Out: 1300  Total Appointment Time (timed & untimed codes): 50 minutes   Treatment time: Functional activities 20 minutes  Neuro re-education 10 minutes  Therapeutic exercise 10 minutes  ADL training 10 minutes    Assessment     Summary/Analysis of session:  Pt seen in clinic setting.  Pt early for session and well groomed.  Unable to perform cotx with PT secondary to scheduling.  Severe right hemiplegia with apraxia continues to hamper overall functional abilities.  Discussed with pt and father discharged planned after 3 more visits.  Recommend continuing PLC to address functional deficits.    Progress toward previous goals: Continue STG/LTG   Goals:      LTG: (8 Weeks)   Pt will increase toilet transfers to min assist. (Progressing)  Pt with be able to don UE garments with min assist and LE garments with mod assist. (Progressing)  Pt will increase RUE function to be able to transfer supine to sit CGA from right side. (Progressing)     STG: (4 Weeks)   Pt will increase toilet transfers to mod assist stand pivot. (Progressing)  Pt will increase UE dressing to CGA and LE dressing to mod assist. (Progressing)  Pt will increase RUE function to be able to transfer supine to sit with min assist from right side. (Progressing)    Plan   Next session to focus on core strength/mobility, dynamic sitting/standing bal  training, ADL training, and reassessment.    Follow Up  Follow up in: 2 days

## 2024-11-21 ENCOUNTER — CLINICAL SUPPORT (OUTPATIENT)
Dept: REHABILITATION | Facility: HOSPITAL | Age: 44
End: 2024-11-21
Payer: OTHER GOVERNMENT

## 2024-11-21 DIAGNOSIS — I69.320 APHASIA AS LATE EFFECT OF CEREBROVASCULAR ACCIDENT (CVA): Primary | ICD-10-CM

## 2024-11-21 DIAGNOSIS — R48.2 APRAXIA OF SPEECH: ICD-10-CM

## 2024-11-21 DIAGNOSIS — I67.89 CEREBROVASCULAR DISEASE, ACUTE: Primary | ICD-10-CM

## 2024-11-21 DIAGNOSIS — I69.322 DYSARTHRIA AS LATE EFFECT OF CEREBELLAR CEREBROVASCULAR ACCIDENT (CVA): ICD-10-CM

## 2024-11-21 PROCEDURE — 97110 THERAPEUTIC EXERCISES: CPT

## 2024-11-21 PROCEDURE — 92507 TX SP LANG VOICE COMM INDIV: CPT

## 2024-11-21 PROCEDURE — 97530 THERAPEUTIC ACTIVITIES: CPT

## 2024-11-21 NOTE — PROGRESS NOTES
[facility]  Speech and Language Therapy   Progress Note      Date:  11/21/2024     Name: Yong Stallings   MRN: [id number]   Therapy Diagnosis:   Encounter Diagnoses   Name Primary?    Aphasia as late effect of cerebrovascular accident (CVA) Yes    Dysarthria as late effect of cerebellar cerebrovascular accident (CVA)     Apraxia of speech          Physician: Mayito Langston MD  Physician Orders: speech eval and treat  Medical Diagnosis: CVA    Visit #/Visits authorized: 6/15  Date of Evaluation:  07/23/24  Insurance Authorization Period: 11/05/24 - 03/15/25  Plan of Care (extension):    10/25/24 - 01/25/24      UNTIMED  Procedure Min.   {Speech- Language- Voice Therapy  30 min / 1 unit           Total Untimed Units: 1  Charges Billed/# of units: 1  Time In:  12:00  Time Out:  12:30  Total Billable Time: 45 min / 1 unit     Precautions: Standard and Fall    Subjective:     Pt reports: Pt visibly annoyed at initiation of session. Pt noted to have yet another new caregiver.     Objective:       Short term goals:     Pt will complete automatic language tasks at mod level with >90% intelligibility and no cues in 3 consecutive sessions. (Updated 11/11/24) Pt completed ABCs with 22/26 acc min cues, increase with cues. 1-10 and 10-1 required initial phonemic cue to initiate sequence.    (0/3 consecutive sessions)   Pt will verbalize name and personally relevant information via labeling and/or wh-questions for basic communication with >90% acc/intelligibility min phonemic cues in 3 consecutive cues.  Pt answered questions and completed carrier phrases with personally relevant single words with written word cue with 87% acc with max cues.     (0/3 consecutive sessions)   Pt will comprehend personally relevant information at short story length with 75% acc no cues in 3 consecutive sessions.  Pt demonstrated comprehension of personally relevant conversation re: wants/needs with 100% acc with min  cues  (1/3 consecutive sessions)   Pt will complete articulatory kinematic drills with CV/VC x4 units (with max of 1 unit different) x5 reps with 90% acc no cues 3 consecutive sessions.  Pt completed drills of 4 units x5 with CV with >90% acc mod-max cues. Increased cueing required today.    Long Term Goal: Pt will communicate basic wants and needs via any modality with trained caregivers.        Patient Education/Response:     Strategies to control laughter discussed.    Written Home Exercises Provided:  automatic language and spelling   Exercises were reviewed and Yong was able to demonstrate them prior to the end of the session.  Yong demonstrated good  understanding of the education provided.         Assessment:   Yong is showing progress towards his goals.  Goals appropriate and will be updated as needed. Pt con't to show minimal functional improvement and inconsistent improvement in structured tasks.    Pt prognosis is Fair. Pt will continue to benefit from skilled outpatient speech and language therapy to address the deficits listed in the problem list on initial evaluation, provide pt/family education and to maximize pt's level of independence in the home and community environment.     KT NOMS (Functional Communication Measures):  KT NOMS: KANDI 07/22/24 08/26/24 10/09/24 10/25/24   Attention: 4 4 4 4   Memory: 4 5 5 5   Motor Speech: 3 4 3-4 3-4   Readin 4 4 4   Spoken Language Comprehension: 4 4 4 4   Spoken Language Expression: 2 3 2-3 3   Swallowin 7 7 7      Barriers to Therapy: prolonged period since CVA; unrealistic expectations of recovery despite education   Pt's spiritual, cultural and educational needs considered and pt agreeable to plan of care and goals.    Plan:   Continue POC, 45 minutes, 2x/week with focus on functional communication of basic wants/needs.       Kate Olvera MA, CCC-SLP  2024

## 2024-11-25 ENCOUNTER — CLINICAL SUPPORT (OUTPATIENT)
Dept: REHABILITATION | Facility: HOSPITAL | Age: 44
End: 2024-11-25
Payer: OTHER GOVERNMENT

## 2024-11-25 DIAGNOSIS — I67.89 CEREBROVASCULAR DISEASE, ACUTE: Primary | ICD-10-CM

## 2024-11-25 DIAGNOSIS — R48.2 APRAXIA OF SPEECH: ICD-10-CM

## 2024-11-25 DIAGNOSIS — I69.320 APHASIA AS LATE EFFECT OF CEREBROVASCULAR ACCIDENT (CVA): Primary | ICD-10-CM

## 2024-11-25 DIAGNOSIS — I69.322 DYSARTHRIA AS LATE EFFECT OF CEREBELLAR CEREBROVASCULAR ACCIDENT (CVA): ICD-10-CM

## 2024-11-25 PROCEDURE — 92507 TX SP LANG VOICE COMM INDIV: CPT

## 2024-11-25 PROCEDURE — 97530 THERAPEUTIC ACTIVITIES: CPT

## 2024-11-25 PROCEDURE — 97110 THERAPEUTIC EXERCISES: CPT

## 2024-11-25 NOTE — PROGRESS NOTES
[facility]  Speech and Language Therapy   Progress Note      Date:  11/25/2024     Name: Yong Stallings   MRN: [id number]   Therapy Diagnosis:   Encounter Diagnoses   Name Primary?    Aphasia as late effect of cerebrovascular accident (CVA) Yes    Dysarthria as late effect of cerebellar cerebrovascular accident (CVA)     Apraxia of speech          Physician: Mayito Langston MD  Physician Orders: speech eval and treat  Medical Diagnosis: CVA    Visit #/Visits authorized: 7/15  Date of Evaluation:  07/23/24  Insurance Authorization Period: 11/05/24 - 03/15/25  Plan of Care (extension):    10/25/24 - 01/25/24      UNTIMED  Procedure Min.   {Speech- Language- Voice Therapy  45 min / 1 unit           Total Untimed Units: 1  Charges Billed/# of units: 1  Time In:  13:00  Time Out:  13:45  Total Billable Time: 45 min / 1 unit     Precautions: Standard and Fall    Subjective:     Pt reports: Pt initiated conversation today with improved word-phrase level output re: topic of choice, the [employer] documentary (Pt has been perseverative on this topic lately per caregivers)    Objective:       Short term goals:     Pt will complete automatic language tasks at mod level with >90% intelligibility and no cues in 3 consecutive sessions. (Updated 11/11/24) Pt completed ABCs with 23/26 acc no cues, increase with cues. 1-10 and 10-1 required initial phonemic cue to initiate sequence.    (0/3 consecutive sessions)   Pt will verbalize name and personally relevant information via labeling and/or wh-questions for basic communication with >90% acc/intelligibility min phonemic cues in 3 consecutive cues.  Pt answered questions and completed carrier phrases with personally relevant single words and phrases with 67% acc no cues and 93% acc with min cues.     (1/3 consecutive sessions)   Pt will comprehend personally relevant information at short story length with 75% acc no cues in 3 consecutive sessions.  Pt demonstrated  comprehension of personally relevant conversation re: wants/needs with 70% acc with mod cues  (0/3 consecutive sessions)   Pt will complete articulatory kinematic drills with CV/VC x4 units (with max of 1 unit different) x5 reps with 90% acc no cues 3 consecutive sessions.  Pt completed drills of 5 units x5 with CV with 50% acc, increase to 100% acc with mod-max cues.     Real word CV combinations utilized per Pt preference.   (0/3 consecutive sessions)   Long Term Goal: Pt will communicate basic wants and needs via any modality with trained caregivers.        Patient Education/Response:     Strategies to control laughter discussed again, redirection from perseveration required.    Written Home Exercises Provided: CV x5 exercises  Exercises were reviewed and Yong was able to demonstrate them prior to the end of the session.  Yong demonstrated good  understanding of the education provided.         Assessment:   Yong is showing progress towards his goals.  Goals appropriate and will be updated as needed. Imrpoved phrase and word level productions initially, reduced as session progressed. Reduce memory impairs use of same words/phrases in function.     Pt prognosis is Fair. Pt will continue to benefit from skilled outpatient speech and language therapy to address the deficits listed in the problem list on initial evaluation, provide pt/family education and to maximize pt's level of independence in the home and community environment.     KT NOMS (Functional Communication Measures):  KT NOMS: KANDI 07/22/24 08/26/24 10/09/24 10/25/24   Attention: 4 4 4 4   Memory: 4 5 5 5   Motor Speech: 3 4 3-4 3-4   Readin 4 4 4   Spoken Language Comprehension: 4 4 4 4   Spoken Language Expression: 2 3 2-3 3   Swallowin 7 7 7      Barriers to Therapy: prolonged period since CVA; unrealistic expectations of recovery despite education   Pt's spiritual, cultural and educational needs considered and pt agreeable to plan of care  and goals.    Plan:   Continue POC, 45 minutes, 2x/week with focus on functional communication of basic wants/needs.       Kate Olvera MA, CCC-SLP  11/25/2024

## 2024-11-25 NOTE — PROGRESS NOTES
[facility]   OUTPATIENT THERAPY      Occupational Therapy Discharge Evaluation     Date: 24  Name: Yong Stallings  Clinic Number: [id number]  : 24        Therapy Diagnosis:        Encounter Diagnoses   Name Primary?    Other cerebrovascular disease Yes    Cerebrovascular disease, acute        Physician: Mayito Langston MD     Physician Orders: OT Evaluate and Treat  Medical Diagnosis: Cerebrovascular Disease  Surgical Procedure and Date: N/A     Precautions:  Seizure     Medical History:   No past medical history on file.     Surgical History:    has no past surgical history on file.     Medications:   currently has no medications in their medication list.     Allergies:   Review of patient's allergies indicates:  Not on File            SUBJECTIVE     Yong continues to demonstrate expressive aphasia/dysarthria.  Spoke with father and recommended he record last visit on cell phone to document functional transfers and exercises to continue in home setting.  Father present and recorded session.  Yong did express feelings of frustration that he is being discharged from OT before he can walk.  Reiterated the goals of OT were to improve pt's functional transfers and basic ADLs to increase pt's independence and burden of care.         Functional Limitations/Social History:     Previous functional status includes: Independent with all ADLs.      Current Functional Status              Home/Living environment: Lives with father in a [facility].  Yong has sitters 6 hours per day.                            Limitation of Functional Status as follows:              ADLs/IADLs:                           - Feeding: Setup using left hand.                          - Bathing: Moderate assist seated on TTB.                          - Dressing/Grooming: Dressing-Max assist LE, Min assist UE.  Grooming-SBA after setup                         - Toileting: Min assist transfer on<>off.  Pt requires max assist  "with clothing management but can perform hygiene with setup.                          - Driving: NA     Pain:  Functional Pain Scale Rating 0-10: Current 0/10, worst 0/10, best 0/10      Patient's Goals for Therapy: "To walk again."        OBJECTIVE      Father present for family training and videoed session.    Transfer training wheelchair to mat with sliding board.  Pt able to position wheelchair and prepare for transfer with 1 tactile assist to remove right leg-rest.  Pt able to place sliding board with 1 tactile assist to adjust board further under pt, then he transferred CGA.  Pt able to cross bilat ankles without assist then transferred sit to supine min assist to raise BLE final 6-inches onto mat. In supine performed progressive stretches to BLEs.  While in supine with knees bent fastened knees together with gait belt, performed core strengthening ex's: side to side knee rocking l/r, bridges, and bilateral hip flexion crunches 10 reps each with minimal tactile assist.  Transfer training supine to sit. Pt able to slide LLE off mat then gait belt looped around right foot as a leg , pt able to then slide RLE off mat SBA.  Min assist required to rotate upper trunk forward onto LLE then pt able to push up with min assist.  Transfer training mat to wheelchair with sliding board to right.  After setup, pt able to transfer into wheelchair with 1 tactile assist to adjust feet and CGA to right thigh while bracing wheelchair and board.                        ASSESSMENT      Yong Stallings is a 44 y.o. male referred to outpatient occupational therapy and presented with a medical diagnosis of CVA with right hemiplegia.  Yong has attended 30 visits of OT and was able to achieve 3/9 goals set.  Due to the severity of the deficits in his RUE, the goal to increase RUE function were discontinued and the focus of treatment shifted to contracture prevention, transfer training, core strengthening, sitting/standing balance " training, and ADL training.  Yong has increased sit to stand transfers to min assist from wheelchair.  His stand pivot transfers continue to fluctuate from min to mod assist depending on direction and hand position.  He has increase sliding board transfers to CGA assist after setup on uneven surfaces.  He has had a notable increase in core strength and his dynamic sitting balance has increase from mod assist to CGA during reaching activities across midline.  Yong has increased UE dressing to CGA.  He requires assistance to hold RUE away from body during donning pull over shirts, but LE dress remains maximal assistance.       Goals:   LTG: (16 Weeks)   Pt will increase toilet transfers to min assist. (Goal not met, stand pivot transfer requires min assist to left mod assist to right)  Pt with be able to don UE garments with min assist and LE garments with mod assist. (Partially Met, Min assist with UE dressing max assist with LE dressing)  Pt will increase RUE function to be able to transfer supine to sit CGA from right side. (Discontinued)  STG: (4 Weeks)   Pt will increase toilet transfers to mod assist stand pivot. (Goal Met)  Pt will increase UE dressing to mod assist and LE dressing to max assist. (Goal Met)  Pt will increase RUE function to be able to transfer supine to sit with min assist from right side. (Discontinue)  New STG 09/19/24 (Complete in 6 weeks)  Pt will increase toilet transfers to min assist. (Goal not met, stand pivot transfer requires min assist to left mod assist to right)  Pt will increase UE dressing to SBA. (Partially met, Yong requires assist to hold RUE away from body during donning)  Pt will increase sliding board transfer to CGA wc<>bed. (Goal Met)    Time In: 1230  Time Out: 1315  Total Appointment Time (timed & untimed codes): 45 minutes     PLAN      Discharge from OT secondary to a plateau in progress.  Yong will continue to receive ST and PT services.    Thank you for allowing me  to assist in the care of Yong.        BEN James

## 2024-11-25 NOTE — PROGRESS NOTES
[facility]                              Daily Treatment Note          Name: Yong Stallings      Therapy Diagnosis:   Encounter Diagnosis   Name Primary?    Cerebrovascular disease, acute Yes     Physician: Mayito Langston MD    Visit Date: 11/25/2024        Time In: 1345  Time Out: 1430  Total Billable Time: 45 minutes    Precautions: Fall        Subjective     Pt reports: feeling well and appeared ready to participate.       Pain: 0/10        Objective     Yong received therapeutic exercises to develop strength, endurance, ROM, flexibility, posture, and core stabilization for 35 minutes including:      Exercise Sets Reps Comments   BLE supine stretching/ROM 1 10 PT assisted pt with manual LE stretching and gentle PROM.  PT assisted with B knee flexion/extension, B hip flexion, B hip IR/ER, B adductor and HS stretching in preparation for mobility. Continued joint instability noted in the L knee with terminal extension.                   LTR 2 10 PT applied a gait belt around the thighs to bind pt's legs together.  Pt performed LTR 2 x 10 without any assistance required.    Bridging 2 10 Pt performed 10 reps of bridging.  PT Stabilized pt's RLE.   BLE knee to chest 2 10 PT assisted pt to bring BLE knees to the chest while legs were bound together. Pt able to initiate the movement on the L.  Resistance applied to the LLE when bringing the feet down to the mat.    LLE side-lying ex 2 10 LLE placed on the sliding board for pt to perform gravity-eliminated hip flexion.  No assistance required.    Clam shells also performed while in side-lying    Resisted trunk rotation performed in side-lying, 1 x 10 each direction.          LLE supine ex 2 10 Heel slides and hip abduction were performed with the L foot placed on a sliding board to reduce friction.  Assistance required to initiate each activity, but this improved with increased reps.      Short arc quads also  performed with PT stabilizing LLE under the knee.          Yong participated in dynamic functional therapeutic activities to improve functional performance for 10  minutes, including:       Assist Level Assistive Device Comments    Bed Mobility MinAA-maxA  Pt mobilized from sitting at the edge of the mat into supine.  Pt was able to cross his feet prior to descending onto the mat. Pt laid toward his L side and controlled his descent into supine.  PT provided assistance with the RLE, but pt was able to bring his LLE onto the mat with the assistance of his L hand.   Due to increased fatigue following LE exercise, pt required assistance to bring BLE off the edge of the mat.  Due to pt's close proximity to the edge of the mat, PT provided assistance to bring trunk safely into upright sitting.    Transfer training CGA-modA  Lateral sliding board t/f performed from the WC to the edge of the low mat.  PT assisted pt to position his WC at the edge of the mat and pt was asked to complete the remaining steps. Pt able to place the board under his L thigh. Pt was able to perform the remainder of the transition in a controlled manner.  PT provided CGA at the RLE for safety.   Upon returning to the WC, PT assisted pt with placing the board. Pt was unable to initiate the uphill transition.  PT assisted pt onto the board, but his hips were positioned too posteriorly, causing his feet to dangle.  PT again assisted pt back into a proper position on the sliding board. Pt completed the remainder of the transition with PT assisting only to adjust the feet.                            Other:             Assessment     Pt tolerated session fairly well.  An extended period of time was required to obtain authorization for additional PT visits.  At this time, PT will plan to see pt for 8-10 additional treatments.  OT has discharged pt from his care as of last week.  Pt's father was involved in the D/C treatment and videoed functional  transfers for reference at home.  Will plan to dedicate the remainder of PT visits to continued strengthening of the LLE.  Pt continues to present with diffuse weakness which is likely the result of disuse atrophy.  Will perform these exercises repeatedly in an effort to promote confidence and comfort for improved carryover at home.      Yong Is progressing well towards his goals.   Pt prognosis is Fair.     Pt will continue to benefit from skilled outpatient physical therapy to address the deficits listed in the problem list box on initial evaluation, provide pt/family education and to maximize pt's level of independence in the home and community environment.     Pt's spiritual, cultural and educational needs considered and pt agreeable to plan of care and goals.    Anticipated barriers to physical therapy: decreased insight into deficits, length of time since CVA    Goals:     See initial evaluation        Plan     Will plan to continue to see pt for PT services in an effort to maximize pt's independence with basic mobility and reduce caregiver burden.      Cliff Hollis, PT, DPT

## 2024-11-27 ENCOUNTER — CLINICAL SUPPORT (OUTPATIENT)
Dept: REHABILITATION | Facility: HOSPITAL | Age: 44
End: 2024-11-27
Payer: OTHER GOVERNMENT

## 2024-11-27 DIAGNOSIS — I67.89 CEREBROVASCULAR DISEASE, ACUTE: ICD-10-CM

## 2024-11-27 DIAGNOSIS — R48.2 APRAXIA OF SPEECH: ICD-10-CM

## 2024-11-27 DIAGNOSIS — I69.322 DYSARTHRIA AS LATE EFFECT OF CEREBELLAR CEREBROVASCULAR ACCIDENT (CVA): ICD-10-CM

## 2024-11-27 DIAGNOSIS — I67.89 CEREBROVASCULAR DISEASE, ACUTE: Primary | ICD-10-CM

## 2024-11-27 DIAGNOSIS — I69.320 APHASIA AS LATE EFFECT OF CEREBROVASCULAR ACCIDENT (CVA): Primary | ICD-10-CM

## 2024-11-27 PROCEDURE — 92507 TX SP LANG VOICE COMM INDIV: CPT

## 2024-11-27 PROCEDURE — 97110 THERAPEUTIC EXERCISES: CPT

## 2024-11-27 PROCEDURE — 97530 THERAPEUTIC ACTIVITIES: CPT

## 2024-11-27 NOTE — PROGRESS NOTES
[facility]                              Daily Treatment Note          Name: Yong Stallings      Therapy Diagnosis:   Encounter Diagnosis   Name Primary?    Cerebrovascular disease, acute Yes     Physician: Mayito Langston MD    Visit Date: 11/25/2024        Time In: 1345  Time Out: 1430  Total Billable Time: 45 minutes    Precautions: Fall        Subjective     Pt reports: feeling well and appeared eager to participate.       Pain: 0/10        Objective     Yong received therapeutic exercises to develop strength, endurance, ROM, flexibility, posture, and core stabilization for 20 minutes including:      Exercise Sets Reps Comments   BLE supine stretching/ROM 1 10 PT assisted pt with manual LE stretching and gentle PROM.  PT assisted with B knee flexion/extension, B hip flexion, B hip IR/ER, B adductor and HS stretching in preparation for mobility. Continued joint instability noted in the L knee with terminal extension.                   LTR 2 15 PT applied a gait belt around the thighs to bind pt's legs together.  Pt performed LTR 2 x 15 without any assistance required.    Bridging 2 15 Pt performed 15 reps of bridging.  PT Stabilized pt's RLE.                                 Yong participated in dynamic functional therapeutic activities to improve functional performance for 25  minutes, including:       Assist Level Assistive Device Comments    Bed Mobility Lola  Pt mobilized from sitting at the edge of the mat into supine.  Pt was able to cross his feet prior to descending onto the mat. Pt laid toward his L side and controlled his descent into supine.  PT provided Lola for feet to clear the edge of the mat.   Upon returning to sitting, a gait belt was looped around each of pt's feet and pt utilized his LUE to bring each leg off the edge of the mat.  PT then created a loop with the gait belt and pt pulled himself up into sitting as if using a trapeze of  sorts.     Transfer training CGA-Lola  Lateral sliding board t/f performed from the WC to the edge of the low mat.  PT assisted pt to position his WC at the edge of the mat and pt was asked to complete the remaining steps. Pt able to place the board under his L thigh. Pt was able to perform the remainder of the transition in a controlled manner.  PT provided CGA at the RLE for safety.   Upon returning to the , the chair was placed to pt's L side and pt was able to complete the uphill transition with much more ease.  Assistance provided to adjust the board and to adjust pt's R foot x 1 occasion.  Overall, pt completed these lateral transitions with much more independence as compared to previous session.    Sit to stand/stand to sit Lola  Pt stood at the railing and performed 5 trials of sit to stand/stand to sit transitions. Pt utilized LUE support on the rail and PT blocked the R knee to prevent buckling.  With proper cueing for positioning of the L foot and increased anterior weight shifting, pt was able to stand with very little assistance from PT.  Good, controlled descent observed during 4 of the 5 reps.  While standing, PT provided cueing for midline orientation.  Pt was able to remove his L hand from the railing for up to 10 seconds at a time.  PT provided Lola for maintenance of balance.    WC mobility SBA-Lola  Pt was able to grasp the sequencing of utilizing his LLE and LUE simultaneously to propel his WC x 30 ft.  This was the first time that pt was able to perform this task without assistance from PT.  PT assisted only 2 x to steer pt's chair away from the mat.                 Other:             Assessment     Pt tolerated session fairly well and demonstrated improved progress toward his functional goals today.  Pt was finally able to implement appropriate sequencing for propelling his personal WC with both his LLE and LUE.  Pt became emotional following completion of this task.  PT reiterated the  importance of celebrating these accomplishments and utilizing this skill within his daily life.  PT encouraged pt to independently propel his chair within his home.      Yong Is progressing well towards his goals.   Pt prognosis is Fair.     Pt will continue to benefit from skilled outpatient physical therapy to address the deficits listed in the problem list box on initial evaluation, provide pt/family education and to maximize pt's level of independence in the home and community environment.     Pt's spiritual, cultural and educational needs considered and pt agreeable to plan of care and goals.    Anticipated barriers to physical therapy: decreased insight into deficits, length of time since CVA    Goals:     See initial evaluation        Plan     Will plan to continue to see pt for PT services in an effort to maximize pt's independence with basic mobility and reduce caregiver burden.      Cliff Hollis, PT, DPT

## 2024-11-27 NOTE — PROGRESS NOTES
[facility]  Speech and Language Therapy   Progress Note      Date:  11/27/2024     Name: Yong Stallings   MRN: [id number]   Therapy Diagnosis:   Encounter Diagnoses   Name Primary?    Aphasia as late effect of cerebrovascular accident (CVA) Yes    Dysarthria as late effect of cerebellar cerebrovascular accident (CVA)     Apraxia of speech     Cerebrovascular disease, acute          Physician: Mayito Langston MD  Physician Orders: speech eval and treat  Medical Diagnosis: CVA    Visit #/Visits authorized: 8/15  Date of Evaluation:  07/23/24  Insurance Authorization Period: 11/05/24 - 03/15/25  Plan of Care (extension):    10/25/24 - 01/25/24      UNTIMED  Procedure Min.   {Speech- Language- Voice Therapy  45 min / 1 unit           Total Untimed Units: 1  Charges Billed/# of units: 1  Time In:  13:00  Time Out:  13:45  Total Billable Time: 45 min / 1 unit     Precautions: Standard and Fall    Subjective:     Pt reports: Pt  reoprted being full and tired from lunch wih mod assistance. Pt participated well in conversation re: CT results in June, prognosis, improvements thus far, and need for work outside of therapy.    Objective:       Short term goals:     Pt will complete automatic language tasks at mod level with >90% intelligibility and no cues in 3 consecutive sessions. (Updated 11/11/24)       (0/3 consecutive sessions)   Pt will verbalize name and personally relevant information via labeling and/or wh-questions for basic communication with >90% acc/intelligibility min phonemic cues in 3 consecutive cues.            (1/3 consecutive sessions)   Pt will comprehend personally relevant information at short story length with 75% acc no cues in 3 consecutive sessions.  Pt demonstrated comprehension of personally relevant conversation re: wants/needs with 80% acc with mod cues  (1/3 consecutive sessions)   Pt will complete articulatory kinematic drills with CV/VC x4 units (with max of 1 unit  different) x5 reps with 90% acc no cues 3 consecutive sessions.  Pt completed drills of 5 units x5 with CV with 100% acc min cues.    Real word CV combinations utilized per Pt preference.   (0/3 consecutive sessions)   Long Term Goal: Pt will communicate basic wants and needs via any modality with trained caregivers.      SLP showed Pt and his father CT results in chart from June of this year. Yong appeared to be shocked by the size of the infarct. SLP educated on CT imaging and answered all questions re: image. SLP reviewed: time post CVA as a barrier to the possibility of drastic improvements with typical therapy, need for focus on functional compensations rather than improving overall underlying impairments, alternative therapies (transcranial stimulation types) offered in clinical trials also reviewed if Pt/family decide this is an option for them.    Patient Education/Response:     Home exercises for functional language output reviewed.    Written Home Exercises Provided: CV x5 exercises. Functional language use at mealtime reviewed.   Exercises were reviewed and Yong was able to demonstrate them prior to the end of the session.  Yong demonstrated good  understanding of the education provided.         Assessment:   Yong is showing progress towards his goals.  Goals appropriate and will be updated as needed. Imrpoved phrase and word level productions initially, reduced as session progressed. Reduce memory impairs use of same words/phrases in function.     Pt prognosis is Fair. Pt will continue to benefit from skilled outpatient speech and language therapy to address the deficits listed in the problem list on initial evaluation, provide pt/family education and to maximize pt's level of independence in the home and community environment.     KT NOMS (Functional Communication Measures):  KT NOMS: KANDI 07/22/24 08/26/24 10/09/24 10/25/24   Attention: 4 4 4 4   Memory: 4 5 5 5   Motor Speech: 3 4 3-4 3-4    Readin 4 4 4   Spoken Language Comprehension: 4 4 4 4   Spoken Language Expression: 2 3 2-3 3   Swallowin 7 7 7      Barriers to Therapy: prolonged period since CVA; unrealistic expectations of recovery despite education   Pt's spiritual, cultural and educational needs considered and pt agreeable to plan of care and goals.    Plan:   Continue POC, 45 minutes, 2x/week with focus on functional communication of basic wants/needs.       Kate Olvera MA, CCC-SLP  2024

## 2024-12-03 ENCOUNTER — CLINICAL SUPPORT (OUTPATIENT)
Dept: REHABILITATION | Facility: HOSPITAL | Age: 44
End: 2024-12-03
Payer: OTHER GOVERNMENT

## 2024-12-03 DIAGNOSIS — I69.322 DYSARTHRIA AS LATE EFFECT OF CEREBELLAR CEREBROVASCULAR ACCIDENT (CVA): ICD-10-CM

## 2024-12-03 DIAGNOSIS — I67.89 OTHER CEREBROVASCULAR DISEASE: Primary | ICD-10-CM

## 2024-12-03 DIAGNOSIS — I69.320 APHASIA AS LATE EFFECT OF CEREBROVASCULAR ACCIDENT (CVA): ICD-10-CM

## 2024-12-03 DIAGNOSIS — I67.89 CEREBROVASCULAR DISEASE, ACUTE: Primary | ICD-10-CM

## 2024-12-03 DIAGNOSIS — R48.2 APRAXIA OF SPEECH: ICD-10-CM

## 2024-12-03 PROCEDURE — 97530 THERAPEUTIC ACTIVITIES: CPT

## 2024-12-03 PROCEDURE — 92507 TX SP LANG VOICE COMM INDIV: CPT

## 2024-12-03 PROCEDURE — 97110 THERAPEUTIC EXERCISES: CPT

## 2024-12-03 NOTE — PROGRESS NOTES
[facility]  Speech and Language Therapy   Progress Note      Date:  12/3/2024     Name: Yong Stallings   MRN: [id number]   Therapy Diagnosis:   Encounter Diagnoses   Name Primary?    Cerebrovascular disease, acute Yes    Aphasia as late effect of cerebrovascular accident (CVA)     Dysarthria as late effect of cerebellar cerebrovascular accident (CVA)     Apraxia of speech          Physician: Mayito Langston MD  Physician Orders: speech eval and treat  Medical Diagnosis: CVA    Visit #/Visits authorized: 9/15  Date of Evaluation:  07/23/24  Insurance Authorization Period: 11/05/24 - 03/15/25  Plan of Care (extension):    10/25/24 - 01/25/24      UNTIMED  Procedure Min.   {Speech- Language- Voice Therapy  45 min / 1 unit           Total Untimed Units: 1  Charges Billed/# of units: 1  Time In:  12:00  Time Out:  12:45  Total Billable Time: 45 min / 1 unit     Precautions: Standard and Fall    Subjective:     Pt reports: Pt demonstrated inconsistent expectations of recovery - at one point saying he hopes to walk again and at other times reporting all he wants to do is watch TV for the rest of his life. Pt consistently reports wanting to return to the [facility] to be with his family.      Objective:       Short term goals:     Pt will complete automatic language tasks at mod level with >90% intelligibility and no cues in 3 consecutive sessions. (Updated 11/11/24) Pt stated ABC's with visual cue with 92% acc no cues.       (1/3 consecutive sessions)   Pt will verbalize name and personally relevant information via labeling and/or wh-questions for basic communication with >90% acc/intelligibility min phonemic cues in 3 consecutive cues.  Pt verbalized single to 2 word personally relevant phrases with 40% acc, increase to 100% with mod-max phonemic cues.           (0/3 consecutive sessions)   Pt will comprehend personally relevant information at short story length with 75% acc no cues in 3  consecutive sessions.  Pt demonstrated comprehension of personally relevant conversation re: goals/wants/needs with 50% acc with mod cues  (0/3 consecutive sessions)   Pt will complete articulatory kinematic drills with CV/VC x4 units (with max of 1 unit different) x5 reps with 90% acc no cues 3 consecutive sessions.  Pt completed drills of 5 units x5 with CV/CVC with 25% acc no cues, increase to 100% with mod cues.    Real word CV/CVC combinations utilized per Pt preference.   (0/3 consecutive sessions)   Long Term Goal: Pt will communicate basic wants and needs via any modality with trained caregivers.      SLP reviewed example of clinical trials and inclusion/exclusion criteria that may be a barrier to participation. SLP also provided a pamphlet for a [facility] in [place] that offers support groups and day programs - Pt con't to deny need for support groups and social interaction.     Patient Education/Response:     Education re: traditional therapy limitations for this Pt. Clinical trials for more experimental therapy reviewed.     Written Home Exercises Provided: CV x5 exercises. Functional language use for requests reviewed.   Exercises were reviewed and Yong was able to demonstrate them prior to the end of the session.  Yong demonstrated good  understanding of the education provided.         Assessment:   Yong is showing progress towards his goals.  Goals appropriate and will be updated as needed. Imrpoved phrase and word level productions initially, reduced as session progressed. Reduce memory impairs use of same words/phrases in function.     Pt prognosis is Fair. Pt will continue to benefit from skilled outpatient speech and language therapy to address the deficits listed in the problem list on initial evaluation, provide pt/family education and to maximize pt's level of independence in the home and community environment.     KT NOMS (Functional Communication Measures):  KT NOMS: KANDI  07/22/24 08/26/24 10/09/24 10/25/24   Attention: 4 4 4 4   Memory: 4 5 5 5   Motor Speech: 3 4 3-4 3-4   Readin 4 4 4   Spoken Language Comprehension: 4 4 4 4   Spoken Language Expression: 2 3 2-3 3   Swallowin 7 7 7      Barriers to Therapy: prolonged period since CVA; unrealistic expectations of recovery despite education   Pt's spiritual, cultural and educational needs considered and pt agreeable to plan of care and goals.    Plan:   Continue POC, 45 minutes, 2x/week with focus on functional communication of basic wants/needs.       Kate Olvera MA, CCC-SLP  2024

## 2024-12-04 NOTE — PROGRESS NOTES
[facility]                              Daily Treatment Note          Name: Yong Stallings      Therapy Diagnosis:   Encounter Diagnosis   Name Primary?    Cerebrovascular disease, acute Yes     Physician: Mayito Langston MD    Visit Date: 12/3/2024        Time In: 1245  Time Out: 1330  Total Billable Time: 45 minutes    Precautions: Fall        Subjective     Pt reports: feeling well.        Pain: 0/10        Objective     Yong received therapeutic exercises to develop strength, endurance, ROM, flexibility, posture, and core stabilization for 35 minutes including:      Exercise Sets Reps Comments   BLE supine stretching/ROM 1 10 PT assisted pt with manual LE stretching and gentle PROM.  PT assisted with B knee flexion/extension, B hip flexion, B hip IR/ER, B adductor and HS stretching in preparation for mobility. Continued joint instability noted in the L knee with terminal extension.                   LTR 2 15 PT applied a gait belt around the thighs to bind pt's legs together.  Pt performed LTR 2 x 15 without any assistance required.    Bridging 2 15 Pt performed 2 x 15 reps of bridging.  PT Stabilized pt's RLE.   BLE knee to chest 2 15 PT assisted pt to bring BLE knees to the chest while legs were bound together. Pt able to initiate the movement on the L.  Resistance applied to the LLE when bringing the feet down to the mat.    LLE side-lying ex 2 15 LLE placed on the sliding board for pt to perform gravity-eliminated hip flexion.  Slight assistance required to initiate the movement, but this improved as activity progressed    Clam shells also performed while in side-lying    Resisted trunk rotation performed in side-lying, 2  x 10 each direction.                      Yong participated in dynamic functional therapeutic activities to improve functional performance for 10  minutes, including:       Assist Level Assistive Device Comments    Bed Mobility  Lana  Pt mobilized from sitting at the edge of the mat into supine.  Pt was able to cross his feet prior to descending onto the mat. Pt laid toward his L side and controlled his descent into supine.  PT provided assistance to bring legs all the way onto the mat.    Gait belt was looped around each of pt's feet and pt was able to utilize his LUE to bring each leg off the edge of the mat.  PT provided pt with the looped gait belt to utilize as a trapeze and pt was able to pull himself into upright sitting with Hailey.   Transfer training CGA-Hailey  Lateral sliding board t/f performed from the WC to the edge of the low mat.  PT assisted pt to position his WC at the edge of the mat and pt was asked to complete the remaining steps. Pt able to place the board under his L thigh. Pt was able to perform the remainder of the transition in a controlled manner.  PT provided CGA at the RLE for safety.   Upon returning to the WC, pt transferred toward his L side and was able to complete the uphill transition with Hailey to adjust his feet.     WC mobility Hailey  Pt able to self-propel his WC x 20 ft using his LUE and LLE.  Pt experienced increased difficultly as compared to last session, likely due to LLE fatigue following exercises.  Hailey was required to assist with steering the WC to avoid veering toward the R.                      Other:             Assessment     Pt tolerated session fairly well.  Pt remains motivated to participate in therapeutic exercise when present.  PT continues to remain concerned that pt's activity at home remains very limited.  LLE remains diffusely weak, despite improvements since initiation of therapy.  This is likely the result of disuse atrophy.  Will plan to see pt for 5 additional treatments in an effort to improve pt's comfort and confidence with therapeutic exercise and activity, in the hopes that this will transition into the home setting.      Yong Is progressing well towards his goals.   Pt  prognosis is Fair.     Pt will continue to benefit from skilled outpatient physical therapy to address the deficits listed in the problem list box on initial evaluation, provide pt/family education and to maximize pt's level of independence in the home and community environment.     Pt's spiritual, cultural and educational needs considered and pt agreeable to plan of care and goals.    Anticipated barriers to physical therapy: decreased insight into deficits, length of time since CVA    Goals:     See initial evaluation        Plan     Will plan to continue to see pt for PT services in an effort to maximize pt's independence with basic mobility and reduce caregiver burden.      Cliff Hollis, PT, DPT

## 2024-12-05 ENCOUNTER — CLINICAL SUPPORT (OUTPATIENT)
Dept: REHABILITATION | Facility: HOSPITAL | Age: 44
End: 2024-12-05
Payer: OTHER GOVERNMENT

## 2024-12-05 DIAGNOSIS — I67.89 CEREBROVASCULAR DISEASE, ACUTE: Primary | ICD-10-CM

## 2024-12-05 DIAGNOSIS — R48.2 APRAXIA OF SPEECH: ICD-10-CM

## 2024-12-05 DIAGNOSIS — I67.89 OTHER CEREBROVASCULAR DISEASE: Primary | ICD-10-CM

## 2024-12-05 DIAGNOSIS — I69.320 APHASIA AS LATE EFFECT OF CEREBROVASCULAR ACCIDENT (CVA): ICD-10-CM

## 2024-12-05 DIAGNOSIS — I69.322 DYSARTHRIA AS LATE EFFECT OF CEREBELLAR CEREBROVASCULAR ACCIDENT (CVA): ICD-10-CM

## 2024-12-05 PROCEDURE — 92507 TX SP LANG VOICE COMM INDIV: CPT

## 2024-12-05 PROCEDURE — 97530 THERAPEUTIC ACTIVITIES: CPT

## 2024-12-05 PROCEDURE — 97110 THERAPEUTIC EXERCISES: CPT

## 2024-12-05 NOTE — PROGRESS NOTES
"[facility]  Speech and Language Therapy   Progress Note      Date:  12/5/2024     Name: Yong Stlalings   MRN: [id number]   Therapy Diagnosis:   Encounter Diagnoses   Name Primary?    Cerebrovascular disease, acute Yes    Aphasia as late effect of cerebrovascular accident (CVA)     Dysarthria as late effect of cerebellar cerebrovascular accident (CVA)     Apraxia of speech          Physician: Mayito Langston MD  Physician Orders: speech eval and treat  Medical Diagnosis: CVA    Visit #/Visits authorized: 10/15  Date of Evaluation:  07/23/24  Insurance Authorization Period: 11/05/24 - 03/15/25  Plan of Care (extension):    10/25/24 - 01/25/24      UNTIMED  Procedure Min.   {Speech- Language- Voice Therapy  60 min / 1 unit           Total Untimed Units: 1  Charges Billed/# of units: 1  Time In:  11:45  Time Out:  12:45  Total Billable Time: 60 min / 1 unit     Precautions: Standard and Fall    Subjective:     Pt reports: Pt arrived overtly upset today. Pt perseverated on need to return to [facility], SLP called Pt's father into session to trouble shoot and design communication technique for when Pt begins to perseverate like this. Pt also asked "what the point" re: therapy and education provided again re: limitations due to length of time since CVA.    Objective:     Behavioral limitation noted in Pt with conversation with Pt's father. Perseverative responses seem to worsen/prolong with fathers use of adequate rationalization. Strategy of "May 2025" suggested - due to reasons with immigration and the Pt's girl friend. Pt is limited by reduced understanding of his comprehension and likely cognitive deficits, as well as his gross limitations in verbal language. SLP attempted use of alphabet board, writing with a pen, and assistance with gestures throughout, but these efforts did not aide in increased communication.    Short term goals:     Pt will complete automatic language tasks at mod level with " >90% intelligibility and no cues in 3 consecutive sessions. (Updated 11/11/24) Pt stated ABC's with visual cue with 85% acc no cues.       (0/3 consecutive sessions)   Pt will verbalize name and personally relevant information via labeling and/or wh-questions for basic communication with >90% acc/intelligibility min phonemic cues in 3 consecutive cues.  Pt verbalized single to 2 word personally relevant phrases with 35% acc, increase to 100% with mod-max phonemic cues.     Pt produced 5-6 intellible phrases in conversation with father. Frequently limited by apraxia/aphasia and difficulty with adequate gesture use. Alphabet board and spelling continues to be nonproductive for communication.      (0/3 consecutive sessions)   Pt will comprehend personally relevant information at short story length with 75% acc no cues in 3 consecutive sessions.  Pt demonstrated comprehension of news story with 88% acc with mod cues.  (0/3 consecutive sessions)   Pt will complete articulatory kinematic drills with CV/VC x4 units (with max of 1 unit different) x5 reps with 90% acc no cues 3 consecutive sessions.         (0/3 consecutive sessions)   Long Term Goal: Pt will communicate basic wants and needs via any modality with trained caregivers.          Patient Education/Response:     Education re: traditional therapy limitations for this Pt again. POC reviewed.    Written Home Exercises Provided: CV x5 exercises. Functional language use for requests reviewed.   Exercises were reviewed and Yong was able to demonstrate them prior to the end of the session.  Yong demonstrated good  understanding of the education provided.         Assessment:   Yong is showing progress towards his goals.  Goals appropriate and will be updated as needed. Imrpoved phrase and word level productions initially, reduced as session progressed. Reduce memory impairs use of same words/phrases in function.     Pt prognosis is Fair. Pt will continue to benefit from  skilled outpatient speech and language therapy to address the deficits listed in the problem list on initial evaluation, provide pt/family education and to maximize pt's level of independence in the home and community environment.     KT NOMS (Functional Communication Measures):  KT NOMS: KANDI 07/22/24 08/26/24 10/09/24 10/25/24   Attention: 4 4 4 4   Memory: 4 5 5 5   Motor Speech: 3 4 3-4 3-4   Readin 4 4 4   Spoken Language Comprehension: 4 4 4 4   Spoken Language Expression: 2 3 2-3 3   Swallowin 7 7 7      Barriers to Therapy: prolonged period since CVA; unrealistic expectations of recovery despite education   Pt's spiritual, cultural and educational needs considered and pt agreeable to plan of care and goals.    Plan:   Continue POC, 45 minutes, 2x/week with focus on functional communication of basic wants/needs.       Kate Olvera MA, CCC-SLP  2024

## 2024-12-09 ENCOUNTER — CLINICAL SUPPORT (OUTPATIENT)
Dept: REHABILITATION | Facility: HOSPITAL | Age: 44
End: 2024-12-09
Payer: OTHER GOVERNMENT

## 2024-12-09 DIAGNOSIS — I67.89 OTHER CEREBROVASCULAR DISEASE: Primary | ICD-10-CM

## 2024-12-09 DIAGNOSIS — I67.89 CEREBROVASCULAR DISEASE, ACUTE: Primary | ICD-10-CM

## 2024-12-09 DIAGNOSIS — I69.322 DYSARTHRIA AS LATE EFFECT OF CEREBELLAR CEREBROVASCULAR ACCIDENT (CVA): ICD-10-CM

## 2024-12-09 DIAGNOSIS — R48.2 APRAXIA OF SPEECH: ICD-10-CM

## 2024-12-09 DIAGNOSIS — I69.320 APHASIA AS LATE EFFECT OF CEREBROVASCULAR ACCIDENT (CVA): ICD-10-CM

## 2024-12-09 PROCEDURE — 92507 TX SP LANG VOICE COMM INDIV: CPT

## 2024-12-09 PROCEDURE — 97530 THERAPEUTIC ACTIVITIES: CPT

## 2024-12-09 PROCEDURE — 97110 THERAPEUTIC EXERCISES: CPT

## 2024-12-09 NOTE — PROGRESS NOTES
[facility]  Speech and Language Therapy   Progress Note      Date:  12/9/2024     Name: Yong Stallings   MRN: [id number]   Therapy Diagnosis:   Encounter Diagnoses   Name Primary?    Cerebrovascular disease, acute Yes    Aphasia as late effect of cerebrovascular accident (CVA)     Dysarthria as late effect of cerebellar cerebrovascular accident (CVA)     Apraxia of speech          Physician: Mayito Langston MD  Physician Orders: speech eval and treat  Medical Diagnosis: CVA    Visit #/Visits authorized: 11/15  Date of Evaluation:  07/23/24  Insurance Authorization Period: 11/05/24 - 03/15/25  Plan of Care (extension):    10/25/24 - 01/25/24      UNTIMED  Procedure Min.   {Speech- Language- Voice Therapy  60 min / 1 unit           Total Untimed Units: 1  Charges Billed/# of units: 1  Time In:  11:45  Time Out:  12:45  Total Billable Time: 60 min / 1 unit     Precautions: Standard and Fall    Subjective:     Pt reports: Pt in good spirits today and somewhat perseverative re: P juanito and Luis Angel SAENZ information from the news.    Objective:     Short term goals:     Pt will complete automatic language tasks at mod level with >90% intelligibility and no cues in 3 consecutive sessions. (Updated 11/11/24) Pt stated ABC's with visual cue with 85% acc no cues.       (88%/3 consecutive sessions)   Pt will verbalize name and personally relevant information via labeling and/or wh-questions for basic communication with >90% acc/intelligibility min phonemic cues in 3 consecutive cues.  Pt verbalized single to 2 word personally relevant phrases within topic of interest (news case) with <50% acc, increase to 100% with phonemic cues.      (0/3 consecutive sessions)   Pt will comprehend personally relevant information at short story length with 75% acc no cues in 3 consecutive sessions.  Pt demonstrated comprehension of news story with 85% acc with mod cues.  (0/3 consecutive sessions)   Pt will complete articulatory  kinematic drills with CV/VC x4 units (with max of 1 unit different) x5 reps with 90% acc no cues 3 consecutive sessions.  Pt completed CV x5 combination x5 each with 57% acc, increase to 71% acc with phonemic cues.        (0/3 consecutive sessions)   Long Term Goal: Pt will communicate basic wants and needs via any modality with trained caregivers.      Pt participated in functional conversation with significant difficulty with names reviewed and practiced throughout. Reduced comprehension at higher level with multiple characters and use of pronouns.    Patient Education/Response:     Apraxia vs aphasia and role of home program reviewed again.    Written Home Exercises Provided: CV x5 exercises. Functional language use for food and requests reviewed.   Exercises were reviewed and Yong was able to demonstrate them prior to the end of the session.  Yong demonstrated good  understanding of the education provided.         Assessment:   Yong is showing progress towards his goals.  Goals appropriate and will be updated as needed. Imrpoved phrase and word level productions initially, reduced as session progressed. Reduce memory impairs use of same words/phrases in function.     Pt prognosis is Fair. Pt will continue to benefit from skilled outpatient speech and language therapy to address the deficits listed in the problem list on initial evaluation, provide pt/family education and to maximize pt's level of independence in the home and community environment.     KT NOMS (Functional Communication Measures):  KT NOMS: KANDI 07/22/24 08/26/24 10/09/24 10/25/24   Attention: 4 4 4 4   Memory: 4 5 5 5   Motor Speech: 3 4 3-4 3-4   Readin 4 4 4   Spoken Language Comprehension: 4 4 4 4   Spoken Language Expression: 2 3 2-3 3   Swallowin 7 7 7      Barriers to Therapy: prolonged period since CVA; unrealistic expectations of recovery despite education   Pt's spiritual, cultural and educational needs considered and pt  agreeable to plan of care and goals.    Plan:   Continue POC, 45 minutes, 2x/week with focus on functional communication of basic wants/needs.       Kate Olvera MA, CCC-SLP  12/09/2024

## 2024-12-09 NOTE — PROGRESS NOTES
" [facility]                              Daily Treatment Note          Name: Yong Stallings      Therapy Diagnosis:   Encounter Diagnosis   Name Primary?    Cerebrovascular disease, acute Yes     Physician: Mayito Langston MD    Visit Date: 12/3/2024        Time In: 1230  Time Out: 1320  Total Billable Time: 50 minutes    Precautions: Fall        Subjective     Pt reports: feeling "okay".  Per SLP, pt was in good spirits.         Pain: 0/10        Objective     Yong received therapeutic exercises to develop strength, endurance, ROM, flexibility, posture, and core stabilization for 35 minutes including:      Exercise Sets Reps Comments   BLE supine stretching/ROM 1 10 PT assisted pt with manual LE stretching and gentle PROM.  PT assisted with B knee flexion/extension, B hip flexion, B hip IR/ER, B adductor and HS stretching in preparation for mobility. Continued joint instability noted in the L knee with terminal extension.                   LTR 2 15 PT applied a gait belt around the thighs to bind pt's legs together.  Pt performed LTR 2 x 15 without any assistance required.    Bridging 2 15 Pt performed 2 x 15 reps of bridging.  PT Stabilized pt's RLE.   Hip adduction  2 15 Ball was placed between pt's knees in a hooklying position. Pt performed 2 x 15 ball squeezes while PT stabilized his RLE.   BLE knee to chest 2 15 PT assisted pt to bring BLE knees to the chest while legs were bound together. Pt able to initiate the movement on the L.  Resistance applied to the LLE when bringing the feet down to the mat.    LLE side-lying ex 2 15 LLE placed on the sliding board for pt to perform gravity-eliminated hip flexion.  Slight assistance required to initiate the movement, but this improved as activity progressed    Clam shells also performed while in side-lying    Resisted trunk rotation performed in side-lying, 1 x 20.   LLE supine ex 2 10 LLE placed on a " sliding board to reduce friction.  Pt performed hip flexion and hip abduction.  Slight assistance required for initiation.      Pt also performed SAQs with PT stabilizing the LE.                Yong participated in dynamic functional therapeutic activities to improve functional performance for 15  minutes, including:       Assist Level Assistive Device Comments    Bed Mobility Lola-modA  Pt mobilized from sitting at the edge of the mat into supine.  Pt was able to cross his feet prior to descending onto the mat. Pt laid toward his L side and controlled his descent into supine.  PT provided assistance to bring legs all the way onto the mat.    Pt able to mobilize his LLE off the edge of the mat using his LUE for assistance.  Gait belt was looped around the R foot and pt was able to utilize this to pull his RLE off the edge of bed.  PT provided pt with the looped gait belt to utilize as a trapeze and pt was able to pull himself into upright sitting with modA due to increased fatigue.    Transfer training CGA-Lola  Lateral sliding board t/f performed from the  to the edge of the low mat.  PT assisted pt to position his WC at the edge of the mat and pt was asked to complete the remaining steps. Pt able to place the board under his L thigh. Pt was able to perform the remainder of the transition in a controlled manner.  PT provided CGA at the RLE for safety.   Upon returning to the , pt transferred toward his L side and was able to complete the uphill transition with Lola-modA.                             Other:             Assessment     Pt tolerated session fairly well, but did appear more fatigued following today's mat exercises.  This impacted pt's transfer back to the  and modA was required for pt to scoot onto the sliding board.  PT feels that pt's reports of increased fatigue are likely secondary to a very sedentary weekend.  As mentioned in prior treatment notes, PT feels that pt is likely most active when  "participating in therapy.  PT remains concerned that this does not translate into pt's home life and that he remains very dependent on others for all aspects of mobility.  SLP reports that pt has communicated that he "just wants to sit and watch TV".  This, unfortunately, will continue to hinder pt's progress as it is vital for pt to be more active in his home environment.  Three additional treatments remain and then pt will be D/C from outpatient PT services.  PT recommends consideration of a "day program" to increased pt's activity.      Yong Is progressing well towards his goals.   Pt prognosis is Fair.     Pt will continue to benefit from skilled outpatient physical therapy to address the deficits listed in the problem list box on initial evaluation, provide pt/family education and to maximize pt's level of independence in the home and community environment.     Pt's spiritual, cultural and educational needs considered and pt agreeable to plan of care and goals.    Anticipated barriers to physical therapy: decreased insight into deficits, length of time since CVA    Goals:     See initial evaluation        Plan     Will plan to continue to see pt for PT services in an effort to maximize pt's independence with basic mobility and reduce caregiver burden.      Cliff Hollis, PT, DPT                         "

## 2024-12-11 ENCOUNTER — CLINICAL SUPPORT (OUTPATIENT)
Dept: REHABILITATION | Facility: HOSPITAL | Age: 44
End: 2024-12-11
Payer: OTHER GOVERNMENT

## 2024-12-11 DIAGNOSIS — I69.320 APHASIA AS LATE EFFECT OF CEREBROVASCULAR ACCIDENT (CVA): ICD-10-CM

## 2024-12-11 DIAGNOSIS — I67.89 CEREBROVASCULAR DISEASE, ACUTE: Primary | ICD-10-CM

## 2024-12-11 DIAGNOSIS — I67.89 OTHER CEREBROVASCULAR DISEASE: Primary | ICD-10-CM

## 2024-12-11 DIAGNOSIS — I69.322 DYSARTHRIA AS LATE EFFECT OF CEREBELLAR CEREBROVASCULAR ACCIDENT (CVA): ICD-10-CM

## 2024-12-11 DIAGNOSIS — R48.2 APRAXIA OF SPEECH: ICD-10-CM

## 2024-12-11 PROCEDURE — 97530 THERAPEUTIC ACTIVITIES: CPT

## 2024-12-11 PROCEDURE — 92507 TX SP LANG VOICE COMM INDIV: CPT

## 2024-12-11 NOTE — PROGRESS NOTES
[facility]  Speech and Language Therapy   Progress Note      Date:  12/11/2024     Name: Yong Stallings   MRN: [id number]   Therapy Diagnosis:   Encounter Diagnoses   Name Primary?    Cerebrovascular disease, acute Yes    Aphasia as late effect of cerebrovascular accident (CVA)     Dysarthria as late effect of cerebellar cerebrovascular accident (CVA)     Apraxia of speech          Physician: Mayito Langston MD  Physician Orders: speech eval and treat  Medical Diagnosis: CVA    Visit #/Visits authorized: 12/15  Date of Evaluation:  07/23/24  Insurance Authorization Period: 11/05/24 - 03/15/25  Plan of Care (extension):    10/25/24 - 01/25/24      UNTIMED  Procedure Min.   {Speech- Language- Voice Therapy  45 min / 1 unit           Total Untimed Units: 1  Charges Billed/# of units: 1  Time In:  11:50  Time Out:  12:35  Total Billable Time: 45 min / 1 unit     Precautions: Standard and Fall    Subjective:     Pt reports: Pt in good spirits today and participated well throughout.    Objective:     Short term goals:     Pt will complete automatic language tasks at mod level with >90% intelligibility and no cues in 3 consecutive sessions. (Updated 11/11/24) Pt stated ABC's with visual cue with 92% acc no cues.       (1/3 consecutive sessions)   Pt will verbalize name and personally relevant information via labeling and/or wh-questions for basic communication with >90% acc/intelligibility min phonemic cues in 3 consecutive cues.  Pt verbalized single to 2 word personally relevant phrases within topic of interest (news case), names,  and typical food items with <50% acc, increase to >90% with phonemic cues.    (0/3 consecutive sessions)   Pt will comprehend personally relevant information at short story length with 75% acc no cues in 3 consecutive sessions.  Pt demonstrated comprehension of news story with 82% acc with mod cues.  (0/3 consecutive sessions)   Pt will complete articulatory kinematic  drills with CV/VC x4 units (with max of 1 unit different) x5 reps with 90% acc no cues 3 consecutive sessions.  Pt completed CV x5 combination x5 each with 55% acc, increase to 91% acc with phonemic cues.        (0/3 consecutive sessions)   Long Term Goal: Pt will communicate basic wants and needs via any modality with trained caregivers.      Reduced recall of familiar names persists. Pt con't to be limited by need for phonemic cues.     Patient Education/Response:     Recall and use of names at home for functional practice reviewed again.     Written Home Exercises Provided: CV x5 exercises. Functional language use for names/   Exercises were reviewed and Yong was able to demonstrate them prior to the end of the session.  Yong demonstrated good  understanding of the education provided.         Assessment:   Yong is showing progress towards his goals.  Goals appropriate and will be updated as needed. Imrpoved phrase and word level productions initially, reduced as session progressed. Reduce memory impairs use of same words/phrases in function.     Pt prognosis is Fair. Pt will continue to benefit from skilled outpatient speech and language therapy to address the deficits listed in the problem list on initial evaluation, provide pt/family education and to maximize pt's level of independence in the home and community environment.     KT NOMS (Functional Communication Measures):  KT NOMS: KANDI 07/22/24 08/26/24 10/09/24 10/25/24   Attention: 4 4 4 4   Memory: 4 5 5 5   Motor Speech: 3 4 3-4 3-4   Readin 4 4 4   Spoken Language Comprehension: 4 4 4 4   Spoken Language Expression: 2 3 2-3 3   Swallowin 7 7 7      Barriers to Therapy: prolonged period since CVA; unrealistic expectations of recovery despite education   Pt's spiritual, cultural and educational needs considered and pt agreeable to plan of care and goals.    Plan:   Continue POC, 45 minutes, 2x/week with focus on functional communication of basic  wants/needs.       Kate Olvera MA, CCC-SLP  12/11/2024

## 2024-12-11 NOTE — PROGRESS NOTES
[facility]                              Daily Treatment Note          Name: Yong Stallings      Therapy Diagnosis:   Encounter Diagnosis   Name Primary?    Cerebrovascular disease, acute Yes     Physician: Mayito Langston MD    Visit Date: 12/3/2024        Time In: 1235  Time Out: 1325  Total Billable Time: 50 minutes    Precautions: Fall        Subjective     Pt reports: no complaints.  Pt appeared to be in very good spirits and was laughing frequently during the exchange from SLP to PT.       Pain: 0/10        Objective         Yong participated in dynamic functional therapeutic activities to improve functional performance for 50  minutes, including:       Assist Level Assistive Device Comments    Transfer training Lola-ModA  Sit to stand/stand to sit transitions performed x 10 trials from WC level. Pt utilized the railing to his L for assistance.  PT blocked the R knee to prevent buckling during transitional movements.  Some increased difficulty observed once fatigue set in.  Cues provided for increased flexion at the waist to aide in controlled descent into the chair.  Pt stood for at least 1 minute during each of the 10 reps of standing.  Pt performed 3 sets of 10 R and L lateral weight shifts, along with 2 sets of 10 reps of removing his L hand from the railing and placing it back down.  During 2 of the stands, PT applied a knee immobilizer to the R knee to aide in prevention of knee buckling.  PT was hopeful that the increased support provided by the addition of the knee immobilizer would give pt increased confidence in standing; however, pt appeared to be distracted by the presence of the device which impaired his performance.    WC mobility SBA-Lola  Pt able to propel his manual WC using his LUE and LLE.  Pt propelled for 40 ft including one 90 degree turn to his R.  PT assisted only very minimally with steering x 3 occasions.  Pt appears to have  a much better understanding of the sequencing required to propel his WC independently.                      Other:             Assessment     Pt tolerated session fairly well, but did require some increased assistance with standing activities.  PT would contribute this to the onset of fatigue.  Will likely consider D/C from therapy following pt's next 1-2 treatments.  Pt has reached a functional plateau and is functioning at what his likely his new baseline. Pt has been able to demonstrate increased independence with lateral transfers, standing, and WC mobility since the onset of therapy services.  PT continues to fear that this does not translate into pt's daily activities within the home as it has been reported that pt remains largely sedentary.  As previously mentioned, PT would recommend participation in a neuro day program if possible.  This would encourage increased participation in basic activities throughout the day.      Yong Is progressing well towards his goals.   Pt prognosis is Fair.     Pt will continue to benefit from skilled outpatient physical therapy to address the deficits listed in the problem list box on initial evaluation, provide pt/family education and to maximize pt's level of independence in the home and community environment.     Pt's spiritual, cultural and educational needs considered and pt agreeable to plan of care and goals.    Anticipated barriers to physical therapy: decreased insight into deficits, length of time since CVA    Goals:     See initial evaluation        Plan     Will plan to continue to see pt for PT services in an effort to maximize pt's independence with basic mobility and reduce caregiver burden.      Cliff Hollis, PT, DPT

## 2024-12-16 ENCOUNTER — CLINICAL SUPPORT (OUTPATIENT)
Dept: REHABILITATION | Facility: HOSPITAL | Age: 44
End: 2024-12-16
Payer: OTHER GOVERNMENT

## 2024-12-16 DIAGNOSIS — I69.320 APHASIA AS LATE EFFECT OF CEREBROVASCULAR ACCIDENT (CVA): ICD-10-CM

## 2024-12-16 DIAGNOSIS — I67.89 CEREBROVASCULAR DISEASE, ACUTE: Primary | ICD-10-CM

## 2024-12-16 DIAGNOSIS — R48.2 APRAXIA OF SPEECH: ICD-10-CM

## 2024-12-16 DIAGNOSIS — I69.322 DYSARTHRIA AS LATE EFFECT OF CEREBELLAR CEREBROVASCULAR ACCIDENT (CVA): ICD-10-CM

## 2024-12-16 DIAGNOSIS — I67.89 OTHER CEREBROVASCULAR DISEASE: Primary | ICD-10-CM

## 2024-12-16 PROCEDURE — 92507 TX SP LANG VOICE COMM INDIV: CPT

## 2024-12-16 PROCEDURE — 97530 THERAPEUTIC ACTIVITIES: CPT

## 2024-12-16 NOTE — PROGRESS NOTES
"[facility]  Speech and Language Therapy   Progress Note      Date:  12/16/2024     Name: Yong Stallings   MRN: [id number]   Therapy Diagnosis:   Encounter Diagnoses   Name Primary?    Cerebrovascular disease, acute Yes    Aphasia as late effect of cerebrovascular accident (CVA)     Dysarthria as late effect of cerebellar cerebrovascular accident (CVA)     Apraxia of speech          Physician: Mayito Langston MD  Physician Orders: speech eval and treat  Medical Diagnosis: CVA    Visit #/Visits authorized: 13/15  Date of Evaluation:  07/23/24  Insurance Authorization Period: 11/05/24 - 03/15/25  Plan of Care (extension):    10/25/24 - 01/25/24      UNTIMED  Procedure Min.   {Speech- Language- Voice Therapy  45 min / 1 unit           Total Untimed Units: 1  Charges Billed/# of units: 1  Time In:  12:45  Time Out:  13:30  Total Billable Time: 45 min / 1 unit     Precautions: Standard and Fall    Subjective:     Pt reports: Pt not in good spirits today upon arrival, asking when his legs will work agin using gesture and disjointed language. SLP again reviewed limitations of traditional therapy 3 years post-CVA and plan for SLP d/c next week. Options for less traditional therapy were again reviewed. Pt does not appear to recall past conversations inititially but later verbalized, "I know."    Objective:     Short term goals:     Pt will complete automatic language tasks at mod level with >90% intelligibility and no cues in 3 consecutive sessions. (Updated 11/11/24) Pt stated ABC's with visual cue with 85% acc no cues.       (0/3 consecutive sessions)   Pt will verbalize name and personally relevant information via labeling and/or wh-questions for basic communication with >90% acc/intelligibility min phonemic cues in 3 consecutive cues.  Pt verbalized single to personally relevant short phrases within topic of interest (food, news, and therapy status), with <50% acc, increase to >90% with phonemic " cues. Independent single words-phrases noted in less structured conversation x4 during session.     (0/3 consecutive sessions)   Pt will comprehend personally relevant information at short story length with 75% acc no cues in 3 consecutive sessions.    \  (0/3 consecutive sessions)   Pt will complete articulatory kinematic drills with CV/VC x4 units (with max of 1 unit different) x5 reps with 90% acc no cues 3 consecutive sessions.    (0/3 consecutive sessions)   Long Term Goal: Pt will communicate basic wants and needs via any modality with trained caregivers.      Much of session spent educating again on CVA, deficits, and typical limitations of traditional therapy 3 years post CVA. POC reviewed in depth. Reduced recall noted. Pt still unable to recall SLPs name nor caregivers name despite review every session via multiple modalities.    Patient Education/Response:     Recall and use of names at home for functional practice reviewed again.     Written Home Exercises Provided: Functional language use for names and food items   Exercises were reviewed and Yong was able to demonstrate them prior to the end of the session.  Yong demonstrated good  understanding of the education provided.     Assessment:   Yong is showing progress towards his goals.  Goals appropriate and will be updated as needed. Reduced recall and unrealistic expectations despite continued education.     Pt prognosis is Fair. Pt to be d/c from Speech Therapy services at end of insurance authorized visits next week.    KT NOMS (Functional Communication Measures):  KT NOMS: KANDI 07/22/24 08/26/24 10/09/24 10/25/24   Attention: 4 4 4 4   Memory: 4 5 5 5   Motor Speech: 3 4 3-4 3-4   Readin 4 4 4   Spoken Language Comprehension: 4 4 4 4   Spoken Language Expression: 2 3 2-3 3   Swallowin 7 7 7      Barriers to Therapy: prolonged period since CVA; unrealistic expectations of recovery despite education   Pt's spiritual, cultural and  educational needs considered and pt agreeable to plan of care and goals.    Plan:   Continue POC, 45 minutes, 2x/week with focus on functional communication of basic wants/needs.       Kate Olvera MA, CCC-SLP  12/18/2024

## 2024-12-18 NOTE — PROGRESS NOTES
"                           [facility]                              Daily Treatment Note          Name: Yong Irby Diagnosis:   Encounter Diagnosis   Name Primary?    Cerebrovascular disease, acute Yes     Physician: Mayito Langston MD    Visit Date: 12/16/2024        Time In: 1330  Time Out: 1415  Total Billable Time: 45 minutes    Precautions: Fall        Subjective     Pt reports: no complaints.  PT discussed with pt that he would be D/C from outpatient PT services following the next treatment. Pt expressed concerns regarding his "legs".  PT discussed the options of a day neuro program and pt appeared very interested.  PT provided pt and father with a brochure for a local program.  Father aware that they will need to pursue this on their own if interested.       Pain: 0/10        Objective       Yong participated in dynamic functional therapeutic activities to improve functional performance for 45  minutes, including:       Assist Level Assistive Device Comments    Transfer training Hailey-modA  Sit to stand/stand to sit transitions performed x 10 trials from WC level. Pt utilized the railing to his L for assistance.  PT blocked the R knee to prevent buckling during transitional movements.  Some increased difficulty observed once fatigue set in, but overall pt's standing quality was better than previous treatment.  Pt stood for at least 1 minute during each of the 10 reps of standing.  Pt performed 3 sets of 10 R and L lateral weight shifts, along with 3 sets of 10 reps of removing his L hand from the railing and placing it back down.  Pt performed 3 sets of 10 mini squats with PT blocking the R knee.  PT also provided cueing at the hips for technique. Pt was very motivated to participate in this particular activity.    LE exercise 2 10 Pt performed LLE LAQs, marches, and knee extension holds (x 10 seconds).  Pt then utilized his LLE to roll his WC forward and " backward, engaging both the quad and HS.  Pt performed this activity for 2 sets of 10 reps.                       Other:             Assessment     Pt tolerated session fairly well and appeared motivated to participate.  As previously mentioned, pt will be D/C from outpatient PT services following his next treatment. Pt has reached a functional plateau, though he has demonstrated gains in his lateral t/f ability, core strength, independent WC propulsion, and overall activity tolerance.  PT feels that pt will be best served by engaging in increased activity throughout the day.  Pt is largely sedentary during his daily routine.  Therefore, PT feels that pt would be well served by participation in a day neuro program.  As mentioned above, family was provided with education and a brochure regarding a local program.  Will plan to provide pt with an HEP next week.      Yong Is progressing well towards his goals.   Pt prognosis is Fair.     Pt will continue to benefit from skilled outpatient physical therapy to address the deficits listed in the problem list box on initial evaluation, provide pt/family education and to maximize pt's level of independence in the home and community environment.     Pt's spiritual, cultural and educational needs considered and pt agreeable to plan of care and goals.    Anticipated barriers to physical therapy: decreased insight into deficits, length of time since CVA    Goals:     See initial evaluation        Plan     Will plan to D/C pt from outpatient PT services following our next treatment.    Cliff Hollis, PT, DPT

## 2024-12-19 ENCOUNTER — CLINICAL SUPPORT (OUTPATIENT)
Dept: REHABILITATION | Facility: HOSPITAL | Age: 44
End: 2024-12-19
Payer: OTHER GOVERNMENT

## 2024-12-19 DIAGNOSIS — I69.322 DYSARTHRIA AS LATE EFFECT OF CEREBELLAR CEREBROVASCULAR ACCIDENT (CVA): ICD-10-CM

## 2024-12-19 DIAGNOSIS — I67.89 CEREBROVASCULAR DISEASE, ACUTE: ICD-10-CM

## 2024-12-19 DIAGNOSIS — I69.320 APHASIA AS LATE EFFECT OF CEREBROVASCULAR ACCIDENT (CVA): Primary | ICD-10-CM

## 2024-12-19 DIAGNOSIS — R48.2 APRAXIA OF SPEECH: ICD-10-CM

## 2024-12-19 PROCEDURE — 92507 TX SP LANG VOICE COMM INDIV: CPT

## 2024-12-19 NOTE — PROGRESS NOTES
[facility]  Speech and Language Therapy   Progress Note      Date:  12/19/2024     Name: Yong Stallings   MRN: [id number]   Therapy Diagnosis:   Encounter Diagnoses   Name Primary?    Aphasia as late effect of cerebrovascular accident (CVA) Yes    Dysarthria as late effect of cerebellar cerebrovascular accident (CVA)     Apraxia of speech     Cerebrovascular disease, acute          Physician: Mayito Langston MD  Physician Orders: speech eval and treat  Medical Diagnosis: CVA    Visit #/Visits authorized: 14/15  Date of Evaluation:  07/23/24  Insurance Authorization Period: 11/05/24 - 03/15/25  Plan of Care (extension):    10/25/24 - 01/25/24      UNTIMED  Procedure Min.   {Speech- Language- Voice Therapy  45 min / 1 unit           Total Untimed Units: 1  Charges Billed/# of units: 1  Time In:  12:45  Time Out:  13:30  Total Billable Time: 45 min / 1 unit     Precautions: Standard and Fall    Subjective:     Pt reports: Pt in good spirits and participated well. Pt agreeable to d/c next session, but still insistent on attempting outpatient PT elsewhere, if possible. Family verbalizes understanding of limitations of traditional therapy 3 years post CVA and lack of improvement noted with current therapy. Family verbalized frustration with patients denial and insistence on continued therapy. The situation is difficult for all involved.     Objective:     Short term goals:     Pt will complete automatic language tasks at mod level with >90% intelligibility and no cues in 3 consecutive sessions.  Pt stated ABC's with visual cue with 88% acc no cues.       (0/3 consecutive sessions)   Pt will verbalize name and personally relevant information via labeling and/or wh-questions for basic communication with >90% acc/intelligibility min phonemic cues in 3 consecutive cues.  Pt verbalized single to personally relevant short phrases within topic of interest (food, news, and ADLs), with 53% acc, increase to  >90% with phonemic cues.     (0/3 consecutive sessions)   Pt will comprehend personally relevant information at short story length with 75% acc no cues in 3 consecutive sessions.  Pt answered simple yes/no questions from short new story with 82% acc - reduced awareness of errors noted.   (0/3 consecutive sessions)   Pt will complete articulatory kinematic drills with CV/VC x4 units (with max of 1 unit different) x5 reps with 90% acc no cues for 3 consecutive sessions.  Pt read and repeated CV x5 with 75% acc, phonemic cues required  (0/3 consecutive sessions)   Long Term Goal: Pt will communicate basic wants and needs via any modality with trained caregivers.      Much of session spent educating again on CVA, deficits, and typical limitations of traditional therapy 3 years post CVA. POC reviewed in depth. Reduced recall noted. Pt still unable to recall SLPs name nor caregivers name despite review every session via multiple modalities.    Patient Education/Response:     POC and plan to d/c after next session.    Written Home Exercises Provided: Functional language use for names and food items   Exercises were reviewed and Yong was able to demonstrate them prior to the end of the session.  Yong demonstrated good  understanding of the education provided.     Assessment:   Yong is showing minimal progress towards his goals.  Goals appropriate and will be updated as needed. D/c next week as Pt is not meeting goals and end of certification period.     Pt prognosis is Fair.    KT NOMS (Functional Communication Measures):  KT NOMS: KANDI 07/22/24 08/26/24 10/09/24 10/25/24   Attention: 4 4 4 4   Memory: 4 5 5 5   Motor Speech: 3 4 3-4 3-4   Readin 4 4 4   Spoken Language Comprehension: 4 4 4 4   Spoken Language Expression: 2 3 2-3 3   Swallowin 7 7 7      Barriers to Therapy: prolonged period since CVA; unrealistic expectations of recovery despite education   Pt's spiritual, cultural and educational needs  considered and pt agreeable to plan of care and goals.    Plan:   Continue POC, 45 minutes, 2x/week with focus on functional communication of basic wants/needs. Plan to d/c after next session.      Kate Olvera MA, CCC-SLP  12/19/2024

## 2024-12-23 ENCOUNTER — CLINICAL SUPPORT (OUTPATIENT)
Dept: REHABILITATION | Facility: HOSPITAL | Age: 44
End: 2024-12-23
Payer: OTHER GOVERNMENT

## 2024-12-23 DIAGNOSIS — R48.2 APRAXIA OF SPEECH: ICD-10-CM

## 2024-12-23 DIAGNOSIS — I69.322 DYSARTHRIA AS LATE EFFECT OF CEREBELLAR CEREBROVASCULAR ACCIDENT (CVA): ICD-10-CM

## 2024-12-23 DIAGNOSIS — I67.89 OTHER CEREBROVASCULAR DISEASE: Primary | ICD-10-CM

## 2024-12-23 DIAGNOSIS — I69.320 APHASIA AS LATE EFFECT OF CEREBROVASCULAR ACCIDENT (CVA): Primary | ICD-10-CM

## 2024-12-23 DIAGNOSIS — I67.89 CEREBROVASCULAR DISEASE, ACUTE: ICD-10-CM

## 2024-12-23 PROCEDURE — 92507 TX SP LANG VOICE COMM INDIV: CPT

## 2024-12-23 PROCEDURE — 97530 THERAPEUTIC ACTIVITIES: CPT

## 2024-12-23 NOTE — PROGRESS NOTES
"[facility]   Physical Therapy D/C Note           Name: Yong Stallings        Therapy Diagnosis:        Encounter Diagnosis   Name Primary?    Other cerebrovascular disease Yes      Physician: Mayito Langston MD     Physician Orders: PT Eval and Treat  Medical Diagnosis from Referral: h/o CVA  Evaluation Date: 7/24/2024    Re-Evaluation Date:  10/10/2024     D/C Date:  12/23/2024    Time In: 1312  Time Out: 1322  Total Billable Time: 10 minutes     Precautions: Fall           Subjective      Date of onset: CVA occurred 3 years ago     History of current condition (from initial eval) - History obtained from pt's father who was present throughout assessment. Pt reportedly suffered a CVA while living in the [facility] 3 years ago.  Pt just recently returned to the [facility] in Feb of 2024.  Pt did receive therapy services in the [facility], but pt's father was unable to provide specifics.  Since his return to the , pt has been residing with his father who serves as his primary caregiver. Pt also has a sitter who is present M-F from 9-5:30.  Pt and father reside in a single story home without steps to enter. Pt has a manual WC in which he presented to therapy today. PT noted that the WC does not have removable armrests or foot rests. Father later reported that the foot rests were in the car.  Pt also has a transport WC which he utilizes in the bathroom due to the narrow doorways. [facility] which pt reportedly "refuses to use".  Pt's father showed PT a picture of the chair.  The power WC has a L joystick and a forearm trough on the R.  Pt also has a "sliding tub bench" and a gait belt at home. Pt is assisted with all mobility.  Sitter and father dependently transfer pt to and from his WC.  Pt sleeps in a bed with an elevating function.  Bed railings are available, but father reports that pt refused to allow them to be attached to the bed.  In terms of ADLs, pt is " "assisted with bathing, dressing, and toilet transfers. Pt does assist with bathing and dressing as able, and performs his own hygiene for toileting.  Prior to his referral to outpatient therapy, pt was receiving HH services.  Father reports that pt was working on standing while holding onto the kitchen countertop.  Pt does present with aphasia and some limited communication.  Pt is limitedly verbal but his very expressive via gestures and facial expressions. Pt did appear to understand more complex conversation, but command following was limited to single step. Pt is currently receiving outpatient speech therapy services and will be evaluated by OT later this week.              Prior Therapy:  services  Social History:  lives with this father  Occupation: previously in the [employer]        Pain:  Current 0/10        Pts goals: "to walk again".              Assessment      Yong is a 44 y.o. male referred to outpatient Physical Therapy with a medical diagnosis of a previous CVA.  Pt is being D/C from outpatient PT services today. Pt has reached a functional plateau, though he has made progress toward each of his functional goals. Pt has become much more independent with lateral transfers using the sliding board.  Pt is able to consistently perform this skill with Lola which has significantly reduced caregiver burden.  Upon initiation of PT services, pt was fully dependent on his father and caregivers for pivot transfers.  Pt has also demonstrated the ability to self-propel his manual WC using his LUE and LLE.  Though pt was not able to reach his goal, pt did demonstrate the ability to perform this skill for household distances.  PT feels that pt's lack of progress/functional plateau is likely due to pt's largely sedentary lifestyle at home.  Though pt has remained engaged throughout therapy; pt and his father have both reported that pt mostly wants to sit and watch TV throughout the day at home.  PT feels that pt would " be best served by a neuro day program.  Both PT and SLP have discussed this with pt and his father. Pt has been provided with a brochure of a local facility which offers these services.  Family has reportedly already reached out to the facility.  During today's session, pt was provided with two copies of a written HEP, one for supine and one to be performed in a seated position.  PT reviewed each exercise with pt and his father.  No questions or concerns addressed.           Plan of care discussed with patient: Yes  Pt's spiritual, cultural and educational needs considered and pt agreeable to plan of care and goals as stated below:      Anticipated Barriers for therapy: duration since CVA, questionable expectations from pt              Goals:     Short Term Goals (4 Weeks):      1)  Pt will perform bed mobility (sitting to supine, supine to sitting) with Lola.  -- partially met  2)  Pt will perform sit to stand/stand to sit transitions with Lola. -- met  3)  Pt will perform lateral transfers to and from his WC with modA.  -- met  4)  Pt will self-propel his manual  ft with SBA using his LUE and LLE.  -- progressed toward, but not met        Long Term Goals (7 Weeks):      1)  Pt will perform bed mobility (sitting to supine, supine to sitting) with supervision. -- not met  2)  Pt will perform sit to stand/stand to sit transitions with CGA.  -- progressed toward, but not met  3)  Pt will perform lateral transfers to and from his WC with Lola. -- met  4)  Pt will self-propel his manual  ft with modified independence using his LUE and LLE. -- progressed toward, but not met              Plan      Pt will be D/C from outpatient PT services at this time.  PT recommends consideration of a neuro day program, as documented above.       Cliff Hollis, PT, DPT           I CERTIFY THE NEED FOR THESE SERVICES FURNISHED UNDER THIS PLAN OF TREATMENT AND WHILE UNDER MY CARE     Physician Name:  _______________________________     Physician Signature: ____________________________

## 2024-12-23 NOTE — PROGRESS NOTES
"[facility]  Speech and Language Therapy     DISCHARGE NOTE      Date:  12/23/2024     Name: Yong Stallings   MRN: [id number]   Therapy Diagnosis:   Encounter Diagnoses   Name Primary?    Aphasia as late effect of cerebrovascular accident (CVA) Yes    Dysarthria as late effect of cerebellar cerebrovascular accident (CVA)     Apraxia of speech     Cerebrovascular disease, acute          Physician: Mayito Langston MD  Physician Orders: speech eval and treat  Medical Diagnosis: CVA    Visit #/Visits authorized: 15/15  Date of Evaluation:  07/23/24  Insurance Authorization Period: 11/05/24 - 03/15/25  Plan of Care (extension):    10/25/24 - 01/25/24      UNTIMED  Procedure Min.   {Speech- Language- Voice Therapy  35 min / 1 unit           Total Untimed Units: 1  Charges Billed/# of units: 1  Time In:  12:40  Time Out:  13:15  Total Billable Time: 35 min / 1 unit     Precautions: Standard and Fall    Subjective:     Pt reports: Pt in good spirits and participated well. Pt agreeable to d/c, Pt demonstrated and verbalized understanding of plateau in progress and need for d/c. Pt remains convinced he needs more therapy because "I need to walk." SLP reviewed option for a day program for improved socialization and practicing of communication in more functional setting.    Objective:     Short term goals:     Pt will complete automatic language tasks at mod level with >90% intelligibility and no cues in 3 consecutive sessions.  Pt stated ABC's with visual cue with 89% acc no cues.       (0/3 consecutive sessions)   Pt will verbalize name and personally relevant information via labeling and/or wh-questions for basic communication with >90% acc/intelligibility min phonemic cues in 3 consecutive cues.  Pt verbalized single to personally relevant short phrases within topic of interest (food, news, and ADLs), with 55% acc, increase to >90% with phonemic cues.     (0/3 consecutive sessions)   Pt will comprehend " personally relevant information at short story length with 75% acc no cues in 3 consecutive sessions.    (0/3 consecutive sessions)   Pt will complete articulatory kinematic drills with CV/VC x4 units (with max of 1 unit different) x5 reps with 90% acc no cues for 3 consecutive sessions.      (0/3 consecutive sessions)   Long Term Goal: Pt will communicate basic wants and needs via any modality with trained caregivers.      POC reviewed- progress and plateau reviewed in depth. Con't recommendation to increase communication at home reviewed with Pt's family and caregiver again. Strategies for family/caregiver to cue when Pt is experiencing difficulty reviewed. Pt's self-cueing strategies reviewed.     Patient Education/Response:     POC and d/c education completed        Assessment:   Yong is showing minimal progress towards his goals.  Goals appropriate and will be updated as needed. D/c next week as Pt is not meeting goals and end of certification period.     Pt prognosis is Fair.    KT NOMS (Functional Communication Measures):  KT NOMS: BREANAAL 07/22/24 08/26/24 10/09/24 10/25/24 12/23/24   Attention: 4 4 4 4 4   Memory: 4 5 5 5 5   Motor Speech: 3 4 3-4 3-4 3-4   Readin 4 4 4 4   Spoken Language Comprehension: 4 4 4 4 4   Spoken Language Expression: 2 3 2-3 3 3   Swallowin 7 7 7 7      Barriers to Therapy: prolonged period since CVA; unrealistic expectations of recovery despite education   Pt's spiritual, cultural and educational needs considered and pt agreeable to plan of care and goals.    Plan:   D/c Pt from [facility].      Kate Olvera MA, CCC-SLP  2024